# Patient Record
Sex: FEMALE | Race: WHITE | NOT HISPANIC OR LATINO | Employment: UNEMPLOYED | URBAN - METROPOLITAN AREA
[De-identification: names, ages, dates, MRNs, and addresses within clinical notes are randomized per-mention and may not be internally consistent; named-entity substitution may affect disease eponyms.]

---

## 2018-04-02 ENCOUNTER — TELEPHONE (OUTPATIENT)
Dept: INTERNAL MEDICINE | Facility: HOSPITAL | Age: 48
End: 2018-04-02

## 2018-04-02 NOTE — TELEPHONE ENCOUNTER
"Connie Chi called requesting an appt. Per pt she exp an episode of \"gastritis\". returned pt's call, the number provided was a fax number and pt's mobile number is not active.   "

## 2018-04-13 ENCOUNTER — OFFICE VISIT (OUTPATIENT)
Dept: INTERNAL MEDICINE | Facility: HOSPITAL | Age: 48
End: 2018-04-13
Attending: NURSE PRACTITIONER
Payer: COMMERCIAL

## 2018-04-13 VITALS
OXYGEN SATURATION: 99 % | HEIGHT: 67 IN | TEMPERATURE: 96.6 F | DIASTOLIC BLOOD PRESSURE: 67 MMHG | WEIGHT: 143 LBS | BODY MASS INDEX: 22.44 KG/M2 | HEART RATE: 80 BPM | SYSTOLIC BLOOD PRESSURE: 116 MMHG

## 2018-04-13 DIAGNOSIS — K29.30 CHRONIC SUPERFICIAL GASTRITIS WITHOUT BLEEDING: Primary | ICD-10-CM

## 2018-04-13 DIAGNOSIS — D50.0 IRON DEFICIENCY ANEMIA DUE TO CHRONIC BLOOD LOSS: ICD-10-CM

## 2018-04-13 PROCEDURE — 99214 OFFICE O/P EST MOD 30 MIN: CPT | Performed by: NURSE PRACTITIONER

## 2018-04-13 RX ORDER — ESCITALOPRAM OXALATE 10 MG/1
10 TABLET ORAL DAILY
COMMUNITY
End: 2018-05-18 | Stop reason: SDUPTHER

## 2018-04-13 RX ORDER — OMEPRAZOLE 20 MG/1
20 CAPSULE, DELAYED RELEASE ORAL DAILY
COMMUNITY
End: 2019-05-22

## 2018-04-13 NOTE — PROGRESS NOTES
Subjective      Patient ID: Connie Chi is a 47 y.o. female.    Patient whose pmh includes Anxiety, GERD, presents today for acute care visit with c/o abdominal pain.     Patient states her symptoms started 1 week prior to [place], c/w with previous episodes of gastritis and GERD. She presented to an Urgent care and told blood work and UA were WNL. Patient has longstanding h/o GERD and abdominal pain, previously taking Prilosec and following with Gi. Her Prilosec was d/c due to anemia and she had been taking ranitidine which was sufficiently controlling her symptoms for some time.  Patient endorses over last several weeks, increased stress and poor eating habits, which contributed to return of symptoms. Her abdominal pain is epigastric, described as sharp. She resumed taking Prilosec about 2 weeks ago with almost full resolution of her symptoms. Patient denies weight loss, fever, chills, N/V, diarrhea, or constipation.     Patient smokes 1/2 ppd. Denies alcohol or illicit drug use. She denies excessive NSAID use.           The following have been reviewed and updated as appropriate in this visit:  Tobacco  Med Hx  Soc Hx      Review of Systems   Constitutional: Negative for fatigue, fever and unexpected weight change.   Respiratory: Negative for cough and shortness of breath.    Cardiovascular: Negative for chest pain.   Gastrointestinal: Positive for abdominal pain. Negative for blood in stool, constipation, diarrhea, nausea and vomiting.       Past Medical History:   Diagnosis Date   • ADHD    • Anxiety    • GERD (gastroesophageal reflux disease)      Allergies   Allergen Reactions   • Amoxicillin    • Potassium Clavulanate Hives   • Pseudoephedrine Sulfate      Other reaction(s): nervous/anxiety       Current Outpatient Prescriptions   Medication Sig Dispense Refill   • buPROPion XL (WELLBUTRIN XL) 150 mg 24 hr tablet Take 150 mg by mouth daily.     • cyclobenzaprine (FLEXERIL) 10 mg tablet Take 10 mg by  "mouth 3 (three) times a day as needed.     • estradiol (ESTRACE) 1 mg tablet Take 1 mg by mouth daily.     • ferrous sulfate 325 mg (65 mg iron) tablet Take 1 tablet by mouth 2 (two) times a day.     • ranitidine (ZANTAC) 150 mg tablet Take 150 mg by mouth 2 (two) times a day.     • traZODone (DESYREL) 50 mg tablet Take 25 mg by mouth nightly.     • docusate sodium (COLACE) 50 mg capsule Take 50 mg by mouth 2 (two) times a day.     • escitalopram (LEXAPRO) 10 mg tablet Take 10 mg by mouth daily.     • omeprazole (PriLOSEC) 20 mg capsule Take 20 mg by mouth daily.       No current facility-administered medications for this visit.        Objective   /67 (BP Location: Right upper arm, Patient Position: Sitting)   Pulse 80   Temp (!) 35.9 °C (96.6 °F) (Oral)   Ht 1.702 m (5' 7\")   Wt 64.9 kg (143 lb)   SpO2 99%   BMI 22.40 kg/m²  Body mass index is 22.4 kg/m².    Physical Exam   Constitutional: She is oriented to person, place, and time. She appears well-developed and well-nourished. No distress.   Cardiovascular: Normal rate and regular rhythm.    Pulmonary/Chest: Effort normal and breath sounds normal.   Abdominal: Soft. Bowel sounds are normal. She exhibits no distension and no mass. There is no tenderness. There is no rebound and no guarding. No hernia.   Neurological: She is alert and oriented to person, place, and time.   Psychiatric: She has a normal mood and affect. Her behavior is normal. Judgment and thought content normal.       Assessment/Plan   Problem List Items Addressed This Visit     Chronic superficial gastritis without bleeding - Primary     Presenting with recurrent reflux and gastritis. Asymptomatic on PPI. Patient will continue Prilosec for now and follow up with Gi. No alarm symptoms at this time but may need EGD for recurrent symptoms and PMH ADAM.   Discussed and encouraged diet modifications and smoking cessation.          Relevant Medications    omeprazole (PriLOSEC) 20 mg capsule "    ranitidine (ZANTAC) 150 mg tablet    Other Relevant Orders    Ambulatory referral to Gastroenterology    Iron deficiency anemia due to chronic blood loss     H/o ADAM, currently taking iron. Colonoscopy 3-4 years ago, s/p IZZY. Repeat CBC and iron studies today. Consider FIT or colonscopy if still anemic.          Relevant Orders    CBC    Iron and TIBC    Ferritin    Basic metabolic panel              Return in about 8 weeks (around 6/8/2018).  Taya Dunn NP

## 2018-04-13 NOTE — PATIENT INSTRUCTIONS
Continue to take Prilose    Get your blood work completed    Schedule a follow up appointment with Dr. Washburn

## 2018-04-16 PROBLEM — F90.9 ADHD (ATTENTION DEFICIT HYPERACTIVITY DISORDER): Status: ACTIVE | Noted: 2017-04-12

## 2018-04-16 PROBLEM — F41.9 ANXIETY: Status: ACTIVE | Noted: 2017-04-12

## 2018-04-16 RX ORDER — ESTRADIOL 1 MG/1
0.5 TABLET ORAL
COMMUNITY
Start: 2017-04-12

## 2018-04-16 RX ORDER — BUPROPION HYDROCHLORIDE 150 MG/1
150 TABLET ORAL DAILY
COMMUNITY
Start: 2017-04-12 | End: 2018-05-18 | Stop reason: SDUPTHER

## 2018-04-16 RX ORDER — FERROUS SULFATE 325(65) MG
1 TABLET ORAL 2 TIMES DAILY
Status: ON HOLD | COMMUNITY
Start: 2017-11-02 | End: 2018-06-20 | Stop reason: ALTCHOICE

## 2018-04-16 RX ORDER — CYCLOBENZAPRINE HCL 10 MG
10 TABLET ORAL 3 TIMES DAILY PRN
COMMUNITY
Start: 2017-06-28 | End: 2019-05-22

## 2018-04-16 RX ORDER — TRAZODONE HYDROCHLORIDE 50 MG/1
50 TABLET ORAL NIGHTLY PRN
COMMUNITY
Start: 2017-04-12

## 2018-04-16 ASSESSMENT — ENCOUNTER SYMPTOMS
SHORTNESS OF BREATH: 0
DIARRHEA: 0
FATIGUE: 0
CONSTIPATION: 0
BLOOD IN STOOL: 0
NAUSEA: 0
FEVER: 0
COUGH: 0
UNEXPECTED WEIGHT CHANGE: 0
VOMITING: 0
ABDOMINAL PAIN: 1

## 2018-04-16 NOTE — ASSESSMENT & PLAN NOTE
H/o ADAM, currently taking iron. Colonoscopy 3-4 years ago, s/p IZZY. Repeat CBC and iron studies today. Consider FIT or colonscopy if still anemic.

## 2018-04-16 NOTE — ASSESSMENT & PLAN NOTE
Presenting with recurrent reflux and gastritis. Asymptomatic on PPI. Patient will continue Prilosec for now and follow up with Gi. No alarm symptoms at this time but may need EGD for recurrent symptoms and PMH ADAM.   Discussed and encouraged diet modifications and smoking cessation.

## 2018-05-04 LAB
BUN SERPL-MCNC: 9 MG/DL (ref 6–24)
BUN/CREAT SERPL: 10 (ref 9–23)
CALCIUM SERPL-MCNC: 8.9 MG/DL (ref 8.7–10.2)
CHLORIDE SERPL-SCNC: 99 MMOL/L (ref 96–106)
CO2 SERPL-SCNC: 22 MMOL/L (ref 18–29)
CREAT SERPL-MCNC: 0.93 MG/DL (ref 0.57–1)
ERYTHROCYTE [DISTWIDTH] IN BLOOD BY AUTOMATED COUNT: 15.3 % (ref 12.3–15.4)
FERRITIN SERPL-MCNC: 19 NG/ML (ref 15–150)
GFR SERPLBLD CREATININE-BSD FMLA CKD-EPI: 73 ML/MIN/1.73
GFR SERPLBLD CREATININE-BSD FMLA CKD-EPI: 85 ML/MIN/1.73
GLUCOSE SERPL-MCNC: 102 MG/DL (ref 65–99)
HCT VFR BLD AUTO: 37.4 % (ref 34–46.6)
HGB BLD-MCNC: 12.2 G/DL (ref 11.1–15.9)
IRON SATN MFR SERPL: 9 % (ref 15–55)
IRON SERPL-MCNC: 27 UG/DL (ref 27–159)
MCH RBC QN AUTO: 27.1 PG (ref 26.6–33)
MCHC RBC AUTO-ENTMCNC: 32.6 G/DL (ref 31.5–35.7)
MCV RBC AUTO: 83 FL (ref 79–97)
PLATELET # BLD AUTO: 267 X10E3/UL (ref 150–379)
POTASSIUM SERPL-SCNC: 4 MMOL/L (ref 3.5–5.2)
RBC # BLD AUTO: 4.51 X10E6/UL (ref 3.77–5.28)
SODIUM SERPL-SCNC: 139 MMOL/L (ref 134–144)
TIBC SERPL-MCNC: 304 UG/DL (ref 250–450)
UIBC SERPL-MCNC: 277 UG/DL (ref 131–425)
WBC # BLD AUTO: 8 X10E3/UL (ref 3.4–10.8)

## 2018-05-18 ENCOUNTER — HOSPITAL ENCOUNTER (EMERGENCY)
Facility: HOSPITAL | Age: 48
Discharge: HOME | End: 2018-05-18
Attending: EMERGENCY MEDICINE
Payer: COMMERCIAL

## 2018-05-18 VITALS
HEART RATE: 86 BPM | TEMPERATURE: 97.6 F | HEIGHT: 70 IN | RESPIRATION RATE: 18 BRPM | DIASTOLIC BLOOD PRESSURE: 70 MMHG | BODY MASS INDEX: 20.76 KG/M2 | WEIGHT: 145 LBS | SYSTOLIC BLOOD PRESSURE: 101 MMHG | OXYGEN SATURATION: 99 %

## 2018-05-18 DIAGNOSIS — Z76.0 MEDICATION REFILL: Primary | ICD-10-CM

## 2018-05-18 PROCEDURE — 99281 EMR DPT VST MAYX REQ PHY/QHP: CPT | Mod: U5

## 2018-05-18 RX ORDER — ESCITALOPRAM OXALATE 10 MG/1
10 TABLET ORAL DAILY
Qty: 5 TABLET | Refills: 0 | Status: SHIPPED | OUTPATIENT
Start: 2018-05-18 | End: 2018-05-18

## 2018-05-18 RX ORDER — ESCITALOPRAM OXALATE 10 MG/1
10 TABLET ORAL DAILY
Qty: 5 TABLET | Refills: 0 | Status: SHIPPED | OUTPATIENT
Start: 2018-05-18 | End: 2018-05-23

## 2018-05-18 RX ORDER — BUPROPION HYDROCHLORIDE 150 MG/1
150 TABLET ORAL DAILY
Qty: 5 TABLET | Refills: 0 | Status: SHIPPED | OUTPATIENT
Start: 2018-05-18 | End: 2018-05-18

## 2018-05-18 RX ORDER — BUPROPION HYDROCHLORIDE 150 MG/1
150 TABLET ORAL DAILY
Qty: 5 TABLET | Refills: 0 | Status: SHIPPED | OUTPATIENT
Start: 2018-05-18 | End: 2018-05-23

## 2018-05-18 ASSESSMENT — ENCOUNTER SYMPTOMS
SLEEP DISTURBANCE: 0
FEVER: 0
CHEST TIGHTNESS: 0
AGITATION: 0
SHORTNESS OF BREATH: 0
PALPITATIONS: 0

## 2018-05-19 NOTE — ED PROVIDER NOTES
"HPI     Chief Complaint   Patient presents with   • Med Refill       47 year old female treated at [facility] for depression.  Had missed a couple of appointments had to go through intake again last month.  Was given 1 month rx of medications but having trouble getting appointment with new psychiatrist.  They will not refill her rx until evaluated.  Was able to get appointment for 5/23 but ran out of wellbutrin and lexapro yesterday.  Denies any other complaints.  Denies suicidal or homicidal ideation.              Patient History     Past Medical History:   Diagnosis Date   • ADHD    • Anxiety    • GERD (gastroesophageal reflux disease)        Past Surgical History:   Procedure Laterality Date   • HYSTERECTOMY  2008    IZZY 2/2 endometriosis       Family History   Problem Relation Age of Onset   • Heart disease Father        Social History   Substance Use Topics   • Smoking status: Current Every Day Smoker     Packs/day: 0.50     Years: 30.00   • Smokeless tobacco: Never Used   • Alcohol use No       Systems Reviewed from Nursing Triage:  Tobacco  Allergies  Meds  Problems  Med Hx  Surg Hx  Soc Hx         Review of Systems     Review of Systems   Constitutional: Negative for fever.   Respiratory: Negative for chest tightness and shortness of breath.    Cardiovascular: Negative for palpitations.   Psychiatric/Behavioral: Negative for agitation, behavioral problems, self-injury, sleep disturbance and suicidal ideas.        Physical Exam     ED Triage Vitals [05/18/18 1914]   Temp Heart Rate Resp BP SpO2   36.4 °C (97.6 °F) 86 18 101/70 99 %      Temp Source Heart Rate Source Patient Position BP Location FiO2 (%) (Set)   Tympanic -- -- -- --                     Patient Vitals for the past 24 hrs:   BP Temp Temp src Pulse Resp SpO2 Height Weight   05/18/18 1914 101/70 36.4 °C (97.6 °F) Tympanic 86 18 99 % 1.778 m (5' 10\") 65.8 kg (145 lb)           Physical Exam   Constitutional: She appears " well-developed and well-nourished.   HENT:   Head: Normocephalic.   Cardiovascular: Normal rate, regular rhythm and normal heart sounds.    Pulmonary/Chest: Effort normal and breath sounds normal.   Skin: Skin is warm and dry.   Nursing note and vitals reviewed.           Procedures    ED Course & MDM     Labs Reviewed - No data to display    No orders to display           MDM         ED Course          Clinical Impressions as of May 19 0521   Medication refill     Disposition:  Discharge     JAMIE Simms  05/19/18 0521

## 2018-05-24 ENCOUNTER — OFFICE VISIT (OUTPATIENT)
Dept: GASTROENTEROLOGY | Facility: HOSPITAL | Age: 48
End: 2018-05-24
Payer: COMMERCIAL

## 2018-05-24 VITALS
SYSTOLIC BLOOD PRESSURE: 119 MMHG | TEMPERATURE: 97.8 F | HEART RATE: 78 BPM | RESPIRATION RATE: 18 BRPM | BODY MASS INDEX: 20.33 KG/M2 | WEIGHT: 142 LBS | DIASTOLIC BLOOD PRESSURE: 77 MMHG | OXYGEN SATURATION: 98 % | HEIGHT: 70 IN

## 2018-05-24 DIAGNOSIS — K21.9 GERD WITHOUT ESOPHAGITIS: ICD-10-CM

## 2018-05-24 DIAGNOSIS — K58.0 IRRITABLE BOWEL SYNDROME WITH DIARRHEA: Primary | ICD-10-CM

## 2018-05-24 DIAGNOSIS — R63.4 WEIGHT LOSS: ICD-10-CM

## 2018-05-24 RX ORDER — BISACODYL 5 MG
TABLET, DELAYED RELEASE (ENTERIC COATED) ORAL
Qty: 4 TABLET | Refills: 0 | Status: ON HOLD | OUTPATIENT
Start: 2018-05-24 | End: 2018-06-20 | Stop reason: ALTCHOICE

## 2018-05-24 RX ORDER — POLYETHYLENE GLYCOL 3350 17 G/17G
POWDER, FOR SOLUTION ORAL
Qty: 238 G | Refills: 0 | Status: ON HOLD | OUTPATIENT
Start: 2018-06-19 | End: 2018-06-20 | Stop reason: ALTCHOICE

## 2018-05-24 ASSESSMENT — ENCOUNTER SYMPTOMS
DIARRHEA: 1
ANAL BLEEDING: 0
ABDOMINAL DISTENTION: 1
PSYCHIATRIC NEGATIVE: 1
ABDOMINAL PAIN: 0
CONSTITUTIONAL NEGATIVE: 1
BLOOD IN STOOL: 0
VOMITING: 0
MUSCULOSKELETAL NEGATIVE: 1
SINUS PRESSURE: 1
NAUSEA: 1
RHINORRHEA: 1
RECTAL PAIN: 0
CONSTIPATION: 0

## 2018-05-24 NOTE — ASSESSMENT & PLAN NOTE
Continue taking ranitidine as needed. Can do daily prilosec if needed.  Discussed avoiding trigger foods and lifestyle modification (smoking cessation)  Will perform upper endoscopy due to chronic ongoing symptoms and weight loss.

## 2018-05-24 NOTE — PROGRESS NOTES
"  Subjective     Patient ID: Connie Chi is a 47 y.o. female.    HPI  The patient reports having a gastritis \"attack\" after eating a soda and a salad, which she describes sharp stabbing pain in her stomach, accompanied by nausea with no vomiting. She felt that her abdomen was distended as well. The pain continued for 4 days, after which she took prilosec which improved her symptoms. She was taking zantac as needed, usually after eating a trigger food, she t mentioned cheesestake, onions, bread. Only having episodes 1-2 x /month.    Since then she has felt much improved without further reflux or abdominal pain. She generally ignores the reflux sensation but does report that the prilosec helps her to notice by feeling better.    She has had an upper endoscopy, but could not remember the last time it was done. She has colonoscopy in the past, was told she has IBD, but it was acute not chronic. She is due for a colonoscopy per her outpatient records.    She noticed she has been losing weight around 10 lbs and her clothes were loose. She feels that she is eating the same amount, although she is trying to eat more healthy.    She is having a well formed stool but then after will pass loose stools. No blood in her stools. No straining and then none after initial passage of stools.     She has recently been taken off of estridiol but is weaning this off. She had endometriosis with diffuse abdominal involvement and had an oophorectomy. Has not vaginal bleeding.     Review of Systems   Constitutional: Negative.    HENT: Positive for congestion, rhinorrhea and sinus pressure.    Gastrointestinal: Positive for abdominal distention, diarrhea and nausea. Negative for abdominal pain, anal bleeding, blood in stool, constipation, rectal pain and vomiting.   Genitourinary: Negative.    Musculoskeletal: Negative.    Skin: Positive for rash.   Psychiatric/Behavioral: Negative.        Objective     Vitals:    05/24/18 1335   BP: " "119/77   Pulse: 78   Resp: 18   Temp: 36.6 °C (97.8 °F)   SpO2: 98%   Weight: 64.4 kg (142 lb)   Height: 1.778 m (5' 10\")     Body mass index is 20.37 kg/m².    Physical Exam   Constitutional: She is oriented to person, place, and time. She appears well-developed and well-nourished.   Eyes: EOM are normal. Pupils are equal, round, and reactive to light.   Neck: Normal range of motion. Neck supple.   Cardiovascular: Normal rate, regular rhythm, normal heart sounds and intact distal pulses.    Pulmonary/Chest: Effort normal and breath sounds normal.   Abdominal: Soft. Bowel sounds are normal. She exhibits no distension and no mass. There is no tenderness. There is no rebound and no guarding. No hernia.   Neurological: She is alert and oriented to person, place, and time.   Psychiatric: She has a normal mood and affect. Her behavior is normal. Judgment and thought content normal.   Nursing note and vitals reviewed.      Assessment/Plan   Problem List Items Addressed This Visit        Digestive    Irritable colon - Primary    Relevant Medications    polyethylene glycol (MIRALAX) 17 gram/dose powder (Start on 6/19/2018)    bisacodyl (DULCOLAX) 5 mg EC tablet    GERD without esophagitis     Continue taking ranitidine as needed. Can do daily prilosec if needed.  Discussed avoiding trigger foods and lifestyle modification (smoking cessation)  Will perform upper endoscopy due to chronic ongoing symptoms and weight loss.            Other    Weight loss     - B12 and folate levels  -TSH  - Continue to follow weight         Relevant Orders    Vitamin B12    Folate    TSH            "

## 2018-05-24 NOTE — PATIENT INSTRUCTIONS
- Plan for EGD and Colonoscopy on 6/20/2018  - Please take the prep as directed.  - Please call with ?'s  - You will need someone to d rive you to and from your appointment.  - Continue taking priolsec as needed with ranitidine as primary therapy

## 2018-05-25 ENCOUNTER — PREP FOR CASE (OUTPATIENT)
Dept: GASTROENTEROLOGY | Facility: HOSPITAL | Age: 48
End: 2018-05-25

## 2018-05-25 DIAGNOSIS — K21.9 GASTROESOPHAGEAL REFLUX DISEASE WITHOUT ESOPHAGITIS: ICD-10-CM

## 2018-05-25 DIAGNOSIS — R63.4 WEIGHT LOSS: Primary | ICD-10-CM

## 2018-05-25 DIAGNOSIS — R19.7 DIARRHEA, UNSPECIFIED TYPE: ICD-10-CM

## 2018-05-25 DIAGNOSIS — K52.9 INFLAMMATORY BOWEL DISEASE: ICD-10-CM

## 2018-05-25 RX ORDER — DEXTROSE 50 % IN WATER (D50W) INTRAVENOUS SYRINGE
25 AS NEEDED
Status: CANCELLED | OUTPATIENT
Start: 2018-05-25 | End: 2018-08-23

## 2018-05-25 RX ORDER — IBUPROFEN 200 MG
16-32 TABLET ORAL AS NEEDED
Status: CANCELLED | OUTPATIENT
Start: 2018-05-25 | End: 2018-08-23

## 2018-05-25 RX ORDER — DEXTROSE 40 %
15-30 GEL (GRAM) ORAL AS NEEDED
Status: CANCELLED | OUTPATIENT
Start: 2018-05-25 | End: 2018-08-23

## 2018-06-01 PROBLEM — K21.9 GASTROESOPHAGEAL REFLUX DISEASE WITHOUT ESOPHAGITIS: Status: ACTIVE | Noted: 2018-06-01

## 2018-06-01 PROBLEM — R19.7 DIARRHEA: Status: ACTIVE | Noted: 2018-06-01

## 2018-06-01 PROBLEM — K52.9 INFLAMMATORY BOWEL DISEASE: Status: ACTIVE | Noted: 2018-06-01

## 2018-06-01 LAB
FOLATE SERPL-MCNC: 11.7 NG/ML
TSH SERPL DL<=0.005 MIU/L-ACNC: 0.77 UIU/ML (ref 0.45–4.5)
VIT B12 SERPL-MCNC: 343 PG/ML (ref 232–1245)

## 2018-06-20 ENCOUNTER — ANESTHESIA (OUTPATIENT)
Dept: ENDOSCOPY | Facility: HOSPITAL | Age: 48
End: 2018-06-20
Payer: COMMERCIAL

## 2018-06-20 ENCOUNTER — ANESTHESIA EVENT (OUTPATIENT)
Dept: ENDOSCOPY | Facility: HOSPITAL | Age: 48
End: 2018-06-20
Payer: COMMERCIAL

## 2018-06-20 ENCOUNTER — HOSPITAL ENCOUNTER (OUTPATIENT)
Facility: HOSPITAL | Age: 48
Discharge: HOME | End: 2018-06-20
Attending: INTERNAL MEDICINE | Admitting: INTERNAL MEDICINE
Payer: COMMERCIAL

## 2018-06-20 VITALS
DIASTOLIC BLOOD PRESSURE: 75 MMHG | SYSTOLIC BLOOD PRESSURE: 120 MMHG | HEART RATE: 72 BPM | RESPIRATION RATE: 16 BRPM | TEMPERATURE: 96.9 F | BODY MASS INDEX: 20.33 KG/M2 | OXYGEN SATURATION: 100 % | HEIGHT: 70 IN | WEIGHT: 142 LBS

## 2018-06-20 DIAGNOSIS — K21.9 GASTROESOPHAGEAL REFLUX DISEASE WITHOUT ESOPHAGITIS: ICD-10-CM

## 2018-06-20 DIAGNOSIS — K52.9 INFLAMMATORY BOWEL DISEASE: ICD-10-CM

## 2018-06-20 DIAGNOSIS — R63.4 WEIGHT LOSS: ICD-10-CM

## 2018-06-20 DIAGNOSIS — K29.70 GASTRITIS WITHOUT BLEEDING, UNSPECIFIED CHRONICITY, UNSPECIFIED GASTRITIS TYPE: ICD-10-CM

## 2018-06-20 DIAGNOSIS — R19.7 DIARRHEA, UNSPECIFIED TYPE: ICD-10-CM

## 2018-06-20 LAB — B-HCG UR QL: NEGATIVE

## 2018-06-20 PROCEDURE — 0DBL8ZX EXCISION OF TRANSVERSE COLON, VIA NATURAL OR ARTIFICIAL OPENING ENDOSCOPIC, DIAGNOSTIC: ICD-10-PCS | Performed by: INTERNAL MEDICINE

## 2018-06-20 PROCEDURE — 88305 TISSUE EXAM BY PATHOLOGIST: CPT | Performed by: INTERNAL MEDICINE

## 2018-06-20 PROCEDURE — 25000000 HC PHARMACY GENERAL: Performed by: NURSE ANESTHETIST, CERTIFIED REGISTERED

## 2018-06-20 PROCEDURE — 25800000 HC PHARMACY IV SOLUTIONS: Performed by: NURSE ANESTHETIST, CERTIFIED REGISTERED

## 2018-06-20 PROCEDURE — 75000011 HC COLONSCOPY BIOPSY: Performed by: INTERNAL MEDICINE

## 2018-06-20 PROCEDURE — 75000060 HC EGD BIOPSY: Performed by: INTERNAL MEDICINE

## 2018-06-20 PROCEDURE — 63600000 HC DRUGS/DETAIL CODE: Performed by: NURSE ANESTHETIST, CERTIFIED REGISTERED

## 2018-06-20 PROCEDURE — 0DBN8ZX EXCISION OF SIGMOID COLON, VIA NATURAL OR ARTIFICIAL OPENING ENDOSCOPIC, DIAGNOSTIC: ICD-10-PCS | Performed by: INTERNAL MEDICINE

## 2018-06-20 PROCEDURE — 0DBP8ZX EXCISION OF RECTUM, VIA NATURAL OR ARTIFICIAL OPENING ENDOSCOPIC, DIAGNOSTIC: ICD-10-PCS | Performed by: INTERNAL MEDICINE

## 2018-06-20 PROCEDURE — 37000002 HC ANESTHESIA MAC: Performed by: INTERNAL MEDICINE

## 2018-06-20 PROCEDURE — 0DBH8ZX EXCISION OF CECUM, VIA NATURAL OR ARTIFICIAL OPENING ENDOSCOPIC, DIAGNOSTIC: ICD-10-PCS | Performed by: INTERNAL MEDICINE

## 2018-06-20 PROCEDURE — 0DB78ZX EXCISION OF STOMACH, PYLORUS, VIA NATURAL OR ARTIFICIAL OPENING ENDOSCOPIC, DIAGNOSTIC: ICD-10-PCS | Performed by: INTERNAL MEDICINE

## 2018-06-20 RX ORDER — LIDOCAINE HYDROCHLORIDE 10 MG/ML
INJECTION, SOLUTION EPIDURAL; INFILTRATION; INTRACAUDAL; PERINEURAL AS NEEDED
Status: DISCONTINUED | OUTPATIENT
Start: 2018-06-20 | End: 2018-06-20 | Stop reason: SURG

## 2018-06-20 RX ORDER — DEXTROSE 50 % IN WATER (D50W) INTRAVENOUS SYRINGE
25 AS NEEDED
Status: DISCONTINUED | OUTPATIENT
Start: 2018-06-20 | End: 2018-06-20 | Stop reason: HOSPADM

## 2018-06-20 RX ORDER — IBUPROFEN 200 MG
16-32 TABLET ORAL AS NEEDED
Status: DISCONTINUED | OUTPATIENT
Start: 2018-06-20 | End: 2018-06-20 | Stop reason: HOSPADM

## 2018-06-20 RX ORDER — SODIUM CHLORIDE 9 MG/ML
INJECTION, SOLUTION INTRAVENOUS CONTINUOUS PRN
Status: DISCONTINUED | OUTPATIENT
Start: 2018-06-20 | End: 2018-06-20 | Stop reason: SURG

## 2018-06-20 RX ORDER — BUPROPION HYDROCHLORIDE 150 MG/1
200 TABLET, EXTENDED RELEASE ORAL DAILY
COMMUNITY
End: 2021-11-16 | Stop reason: ALTCHOICE

## 2018-06-20 RX ORDER — PROPOFOL 10 MG/ML
INJECTION, EMULSION INTRAVENOUS CONTINUOUS PRN
Status: DISCONTINUED | OUTPATIENT
Start: 2018-06-20 | End: 2018-06-20 | Stop reason: SURG

## 2018-06-20 RX ORDER — DEXTROSE 40 %
15-30 GEL (GRAM) ORAL AS NEEDED
Status: DISCONTINUED | OUTPATIENT
Start: 2018-06-20 | End: 2018-06-20 | Stop reason: HOSPADM

## 2018-06-20 RX ORDER — ESCITALOPRAM OXALATE 10 MG/1
10 TABLET ORAL DAILY
COMMUNITY
End: 2019-05-03 | Stop reason: ALTCHOICE

## 2018-06-20 RX ORDER — PROPOFOL 200MG/20ML
SYRINGE (ML) INTRAVENOUS AS NEEDED
Status: DISCONTINUED | OUTPATIENT
Start: 2018-06-20 | End: 2018-06-20 | Stop reason: SURG

## 2018-06-20 RX ORDER — GLYCOPYRROLATE 0.6MG/3ML
SYRINGE (ML) INTRAVENOUS AS NEEDED
Status: DISCONTINUED | OUTPATIENT
Start: 2018-06-20 | End: 2018-06-20 | Stop reason: SURG

## 2018-06-20 RX ADMIN — PROPOFOL 20 MG: 10 INJECTION, EMULSION INTRAVENOUS at 11:43

## 2018-06-20 RX ADMIN — PROPOFOL 140 MCG/KG/MIN: 10 INJECTION, EMULSION INTRAVENOUS at 11:48

## 2018-06-20 RX ADMIN — PROPOFOL 50 MG: 10 INJECTION, EMULSION INTRAVENOUS at 11:39

## 2018-06-20 RX ADMIN — GLYCOPYRROLATE 0.2 MG: 0.2 INJECTION INTRAMUSCULAR; INTRAVENOUS at 11:32

## 2018-06-20 RX ADMIN — PROPOFOL 20 MG: 10 INJECTION, EMULSION INTRAVENOUS at 11:50

## 2018-06-20 RX ADMIN — LIDOCAINE HYDROCHLORIDE 8 ML: 10 INJECTION, SOLUTION EPIDURAL; INFILTRATION; INTRACAUDAL; PERINEURAL at 11:35

## 2018-06-20 RX ADMIN — SODIUM CHLORIDE: 9 INJECTION, SOLUTION INTRAVENOUS at 11:29

## 2018-06-20 RX ADMIN — PROPOFOL 20 MG: 10 INJECTION, EMULSION INTRAVENOUS at 11:42

## 2018-06-20 RX ADMIN — PROPOFOL 40 MG: 10 INJECTION, EMULSION INTRAVENOUS at 11:41

## 2018-06-20 RX ADMIN — PROPOFOL 20 MG: 10 INJECTION, EMULSION INTRAVENOUS at 11:40

## 2018-06-20 RX ADMIN — PROPOFOL 40 MG: 10 INJECTION, EMULSION INTRAVENOUS at 11:45

## 2018-06-20 RX ADMIN — PROPOFOL 50 MG: 10 INJECTION, EMULSION INTRAVENOUS at 11:38

## 2018-06-20 RX ADMIN — PROPOFOL 20 MG: 10 INJECTION, EMULSION INTRAVENOUS at 11:52

## 2018-06-20 RX ADMIN — PROPOFOL 100 MG: 10 INJECTION, EMULSION INTRAVENOUS at 11:37

## 2018-06-20 ASSESSMENT — PAIN SCALES - GENERAL: PAIN_LEVEL: 0

## 2018-06-20 ASSESSMENT — PAIN - FUNCTIONAL ASSESSMENT
PAIN_FUNCTIONAL_ASSESSMENT: UNABLE TO ASSESS
PAIN_FUNCTIONAL_ASSESSMENT: UNABLE TO ASSESS

## 2018-06-20 ASSESSMENT — LIFESTYLE VARIABLES: TOBACCO_USE: 1

## 2018-06-20 NOTE — OP NOTE
_______________________________________________________________________________  Patient Name: Connie Chi       Procedure Date: 6/20/2018 11:26 AM  MRN: [id number]                     Account Number: [id number]  YOB: 1970             Age: 47  Gender: Female                        Note Status: Finalized  Attending MD: MAYE AMATO MD~LIMA  _______________________________________________________________________________  Procedure:           Upper GI endoscopy  Indications:         Heartburn, Suspected gastro-esophageal reflux disease,  Weight loss  Providers:           MAYE AMATO MD~LIMA (Doctor), JOSE LUIS GARRETT DO (Fellow), Lacy Holt RN (Nurse)  Referring MD:        KATHY DAVIS MD  Requesting Provider:  Medicines:           Monitored Anesthesia Care  Complications:       No immediate complications.  _______________________________________________________________________________  Procedure:           Pre-Anesthesia Assessment:  - The heart rate, respiratory rate, oxygen saturations,  blood pressure, adequacy of pulmonary ventilation, and  response to care were monitored throughout the procedure.  After obtaining informed consent, the endoscope was  passed under direct vision. Throughout the procedure,  the patient's blood pressure, pulse, and oxygen  saturations were monitored continuously. The endoscope  was introduced through the mouth, and advanced to the  second part of duodenum. The upper GI endoscopy was  accomplished without difficulty. The patient tolerated  the procedure well.  Findings:  The Z-line was regular and was found 37 cm from the incisors.  The examined esophagus was normal.  A 4 mm scar was found in the gastric antrum. The scar tissue was healthy  in appearance.  Localized mild inflammation characterized by erythema was found in the  gastric antrum. Biopsies were taken with a cold forceps for Helicobacter  pylori  testing.  The examined duodenum was normal.  Impression:          - Z-line regular, 37 cm from the incisors.  - Normal esophagus.  - Gastritis. Biopsied.  - Small linear scar in the antrum. Likely a healed ulcer.  - Small hiatus hernia.  - Normal examined duodenum.  Recommendation:      - Discharge patient to home.  - Patient has a contact number available for  emergencies. The signs and symptoms of potential delayed  complications were discussed with the patient. Return to  normal activities tomorrow. Written discharge  instructions were provided to the patient.  - Resume regular diet.  - Use Prilosec (omeprazole) 40 mg PO daily.  - Await pathology results.  - Telephone GI office for pathology results in 2 weeks.  - The findings and recommendations were discussed with  the patient.  Procedure Code(s):   --- Professional ---  52727, Esophagogastroduodenoscopy, flexible, transoral;  with biopsy, single or multiple  Diagnosis Code(s):   --- Professional ---  K29.70, Gastritis, unspecified, without bleeding  K44.9, Diaphragmatic hernia without obstruction or  gangrene  R12, Heartburn  R63.4, Abnormal weight loss  CPT copyright 2015 American Medical Association. All rights reserved.  The codes documented in this report are preliminary and upon  review may  be revised to meet current compliance requirements.  Attending Participation:  I was present and participated during the entire procedure, including  non-key portions.  _____________________________________  MAYE AMATO MD~LIMA  6/20/2018 5:00:36 PM  This report has been signed electronically.  Number of Addenda: 0  Note Initiated On: 6/20/2018 11:26 AM

## 2018-06-20 NOTE — DISCHARGE INSTRUCTIONS
Gastritis, Adult  Gastritis is swelling (inflammation) of the stomach. When you have this condition, you can have these problems (symptoms):  · Pain in your stomach.  · A burning feeling in your stomach.  · Feeling sick to your stomach (nauseous).  · Throwing up (vomiting).  · Feeling too full after you eat.  It is important to get help for this condition. Without help, your stomach can bleed, and you can get sores (ulcers) in your stomach.  Follow these instructions at home:  · Take over-the-counter and prescription medicines only as told by your doctor.  · If you were prescribed an antibiotic medicine, take it as told by your doctor. Do not stop taking it even if you start to feel better.  · Drink enough fluid to keep your pee (urine) clear or pale yellow.  · Instead of eating big meals, eat small meals often.  Contact a health care provider if:  · Your problems get worse.  · Your problems go away and then come back.  Get help right away if:  · You throw up blood or something that looks like coffee grounds.  · You have black or dark red poop (stools).  · You cannot keep fluids down.  · Your stomach pain gets worse.  · You have a fever.  · You do not feel better after 1 week.  This information is not intended to replace advice given to you by your health care provider. Make sure you discuss any questions you have with your health care provider.  Document Released: 06/05/2009 Document Revised: 08/16/2017 Document Reviewed: 09/10/2016  [employer] Interactive Patient Education © 2018 [employer]    Colon Polyps  Polyps are tissue growths inside the body. Polyps can grow in many places, including the large intestine (colon). A polyp may be a round bump or a mushroom-shaped growth. You could have one polyp or several.  Most colon polyps are noncancerous (benign). However, some colon polyps can become cancerous over time.  What are the causes?  The exact cause of colon polyps is not known.  What increases the risk?  This  condition is more likely to develop in people who:  · Have a family history of colon cancer or colon polyps.  · Are older than 50 or older than 45 if they are .  · Have inflammatory bowel disease, such as ulcerative colitis or Crohn disease.  · Are overweight.  · Smoke cigarettes.  · Do not get enough exercise.  · Drink too much alcohol.  · Eat a diet that is:  ¨ High in fat and red meat.  ¨ Low in fiber.  · Had childhood cancer that was treated with abdominal radiation.  What are the signs or symptoms?  Most polyps do not cause symptoms. If you have symptoms, they may include:  · Blood coming from your rectum when having a bowel movement.  · Blood in your stool. The stool may look dark red or black.  · A change in bowel habits, such as constipation or diarrhea.  How is this diagnosed?  This condition is diagnosed with a colonoscopy. This is a procedure that uses a lighted, flexible scope to look at the inside of your colon.  How is this treated?  Treatment for this condition involves removing any polyps that are found. Those polyps will then be tested for cancer. If cancer is found, your health care provider will talk to you about options for colon cancer treatment.  Follow these instructions at home:  Diet  · Eat plenty of fiber, such as fruits, vegetables, and whole grains.  · Eat foods that are high in calcium and vitamin D, such as milk, cheese, yogurt, eggs, liver, fish, and broccoli.  · Limit foods high in fat, red meats, and processed meats, such as hot dogs, sausage, coleman, and lunch meats.  · Maintain a healthy weight, or lose weight if recommended by your health care provider.  General instructions  · Do not smoke cigarettes.  · Do not drink alcohol excessively.  · Keep all follow-up visits as told by your health care provider. This is important. This includes keeping regularly scheduled colonoscopies. Talk to your health care provider about when you need a colonoscopy.  · Exercise every  day or as told by your health care provider.  Contact a health care provider if:  · You have new or worsening bleeding during a bowel movement.  · You have new or increased blood in your stool.  · You have a change in bowel habits.  · You unexpectedly lose weight.  This information is not intended to replace advice given to you by your health care provider. Make sure you discuss any questions you have with your health care provider.  Document Released: 09/13/2005 Document Revised: 05/25/2017 Document Reviewed: 11/07/2016  [employer] Interactive Patient Education © 2018 [employer]

## 2018-06-20 NOTE — ANESTHESIA POSTPROCEDURE EVALUATION
Patient: Connie Chi    Procedure Summary     Date:  06/20/18 Room / Location:  [facility] (A) / [facility]    Anesthesia Start:  1129 Anesthesia Stop:  1220    Procedures:       FLEXIBLE COLONOSCOPY PROXIMAL TO SPLENIC FLEXURE WITH BIOPSY (N/A Anus)      UPPER GASTROINTESTINAL ENDOSCOPY WITH BIOPSY (N/A Esophagus) Diagnosis:       Inflammatory bowel disease      Weight loss      Gastroesophageal reflux disease without esophagitis      Diarrhea, unspecified type      (Inflammatory bowel disease [K52.9])      (Weight loss [R63.4])      (Gastroesophageal reflux disease without esophagitis [K21.9])      (Diarrhea, unspecified type [R19.7])    Provider:  Gerson Bocaengra MD Responsible Provider:  Layo Leo MD    Anesthesia Type:  MAC ASA Status:  2          Anesthesia Type: MAC  PACU Vitals  6/20/2018 1215 - 6/20/2018 1309      6/20/2018 1219 6/20/2018 1224 6/20/2018 1230 6/20/2018 1245    BP: (!)  99/57 (!)  89/53 (!)  94/56 (!)  96/55    Temp: (!)  35.8 °C (96.5 °F) - - -    Pulse: 88 86 85 74    Resp: 16 16 16 -    SpO2: 100 % 100 % 96 % 100 %              6/20/2018 1300 6/20/2018 1305          BP: 119/68 120/75      Temp: - (!)  36.1 °C (96.9 °F)      Pulse: 80 72      Resp: - -      SpO2: 100 % 100 %              Anesthesia Post Evaluation    Pain score: 0  Pain management: satisfactory to patient  Mode of pain management: IV medication  Patient location during evaluation: PACU  Patient participation: complete - patient participated  Level of consciousness: awake and alert  Cardiovascular status: acceptable  Airway Patency: adequate  Respiratory status: acceptable  Hydration status: stable  Anesthetic complications: no

## 2018-06-20 NOTE — OP NOTE
_______________________________________________________________________________  Patient Name: Connie Chi       Procedure Date: 6/20/2018 11:24 AM  MRN: [id number]                     Account Number: [id number]  YOB: 1970             Age: 47  Gender: Female                        Note Status: Finalized  Attending MD: MAYE AMATO MD~LIMA  _______________________________________________________________________________  Procedure:           Colonoscopy  Indications:         Personal history of inflammatory bowel disease,  Clinically significant diarrhea of unexplained origin,  Weight loss  Providers:           MAYE AMATO MD~LIMA (Doctor), JOSE LUIS GARRETT DO (Fellow), Lacy Holt RN (Nurse)  Referring MD:        KATHY DAVIS MD  Requesting Provider:  Medicines:           Monitored Anesthesia Care  Complications:       No immediate complications.  _______________________________________________________________________________  Procedure:           After I obtained informed consent, the scope was passed  under direct vision. Throughout the procedure, the  patient's blood pressure, pulse, and oxygen saturations  were monitored continuously. The Colonoscope was  introduced through the anus and advanced to the terminal  ileum. The colonoscopy was performed without difficulty.  The patient tolerated the procedure well. The quality of  the bowel preparation was good.  Findings:  The perianal and digital rectal examinations were normal.  A 4 mm polyp was found in the rectum. The polyp was sessile. The polyp  was removed with a cold biopsy forceps. Resection and retrieval were  complete.  The exam was otherwise normal throughout the examined colon.  The terminal ileum appeared normal. Biopsies were taken with a cold  forceps for histology.  Biopsies were taken with a cold forceps in the rectum, in the sigmoid  colon, in the transverse colon and in the cecum  for histology to  evaluate for microscopic colitis and her history of IBD.  Impression:          - One 4 mm polyp in the rectum, removed with a cold  biopsy forceps. Resected and retrieved.  - The examined portion of the ileum was normal. Biopsied.  - Biopsies were taken with a cold forceps for histology  in the rectum, in the sigmoid colon, in the transverse  colon and in the cecum.  Recommendation:      - Patient has a contact number available for  emergencies. The signs and symptoms of potential delayed  complications were discussed with the patient. Return to  normal activities tomorrow. Written discharge  instructions were provided to the patient.  - Discharge patient to home.  - Resume regular diet.  - Continue present medications.  - Await pathology results.  - Telephone GI office for pathology results.  - The findings and recommendations were discussed with  the patient.  Procedure Code(s):   --- Professional ---  26558, Colonoscopy, flexible; with biopsy, single or  multiple  Diagnosis Code(s):   --- Professional ---  K62.1, Rectal polyp  Z87.19, Personal history of other diseases of the  digestive system  R19.7, Diarrhea, unspecified  R63.4, Abnormal weight loss  CPT copyright 2015 [employer]. All rights reserved.  The codes documented in this report are preliminary and upon  review may  be revised to meet current compliance requirements.  Attending Participation:  I was present and participated during the entire procedure, including  non-key portions.  _____________________________________  MAYE AMATO MD~LIMA  6/20/2018 5:01:08 PM  This report has been signed electronically.  Number of Addenda: 0  Note Initiated On: 6/20/2018 11:24 AM

## 2018-06-20 NOTE — ANESTHESIA PREPROCEDURE EVALUATION
Anesthesia ROS/MED HX      Pulmonary    tobacco use  Neuro/Psych    Anxiety  Cardiovascular- neg  GI/Hepatic   GERD  ROS/MED HX Comments:    Neurology/Psychology: ADHD    Past Medical History:   Diagnosis Date   • ADHD    • Anxiety    • GERD (gastroesophageal reflux disease)          Past Surgical History:   Procedure Laterality Date   • HYSTERECTOMY  2008    IZZY 2/2 endometriosis   • WISDOM TOOTH EXTRACTION         Physical Exam    Airway   Mallampati: I   TM distance: >3 FB   Neck ROM: full  Cardiovascular    Rhythm: regular   Rate: normal  Pulmonary    Decreased breath sounds        Anesthesia Plan    Plan: MAC    Technique: MAC     Airway: natural airway / supplemental oxygen   ASA 2  Anesthetic plan and risks discussed with: patient  Induction:    intravenous   Postop Plan:   Patient Disposition: phase II then home

## 2018-06-20 NOTE — H&P
"   GI H/P - Pre Procedure          Subjective   Connie Chi is a 47 y.o. female who was admitted for Inflammatory bowel disease [K52.9]  Weight loss [R63.4]  Gastroesophageal reflux disease without esophagitis [K21.9]  Diarrhea, unspecified type [R19.7].     The patient reports having a gastritis \"attack\" after eating a soda and a salad, which she describes sharp stabbing pain in her stomach, accompanied by nausea with no vomiting. She felt that her abdomen was distended as well. The pain continued for 4 days, after which she took prilosec which improved her symptoms. She was taking zantac as needed, usually after eating a trigger food, she t mentioned cheesestake, onions, bread. Only having episodes 1-2 x /month.     Since then she has felt much improved without further reflux or abdominal pain. She generally ignores the reflux sensation but does report that the prilosec helps her to notice by feeling better.     She has had an upper endoscopy, but could not remember the last time it was done. She has colonoscopy in the past, was told she has IBD, but it was acute not chronic. She is due for a colonoscopy per her outpatient records.     She noticed she has been losing weight around 10 lbs and her clothes were loose. She feels that she is eating the same amount, although she is trying to eat more healthy.     She is having a well formed stool but then after will pass loose stools. No blood in her stools. No straining and then none after initial passage of stools.      She has recently been taken off of estridiol but is weaning this off. She had endometriosis with diffuse abdominal involvement and had an oophorectomy. Has not vaginal bleeding.         Past Medical History:   Diagnosis Date   • ADHD    • Anxiety    • GERD (gastroesophageal reflux disease)      Past Surgical History:   Procedure Laterality Date   • HYSTERECTOMY  2008    IZZY 2/2 endometriosis   • WISDOM TOOTH EXTRACTION       Allergies " "  Allergen Reactions   • Amoxicillin        Home Medications:  •  buPROPion SR, Take 150 mg by mouth 2 (two) times a day.  •  escitalopram, Take 10 mg by mouth daily.  •  estradiol, Take 1 mg by mouth daily.  •  traZODone, Take 25 mg by mouth nightly.  •  cyclobenzaprine, Take 10 mg by mouth 3 (three) times a day as needed.  •  omeprazole, Take 20 mg by mouth daily.  •  ranitidine, Take 150 mg by mouth 2 (two) times a day.        Physical Exam  /79   Pulse 75   Temp 36.5 °C (97.7 °F) (Tympanic)   Resp 15   Ht 1.778 m (5' 10\")   Wt 64.4 kg (142 lb)   SpO2 98%   BMI 20.37 kg/m²     General appearance: no distress  Head: normocephalic  Lungs: clear to auscultation anteriorly   Heart: regular rate   Abdomen: soft, non-tender; bowel sounds normal; no masses, no organomegaly  Neurologic: awake and alert and oriented        Assessment   Ongoing reflux and severe pain with eating. Will proceed with EGD    Has change in bowel habits, ongoing diarrhea, has a history of IBD (no documentation, was told it was acute not chronic, possibly colitis) and polyps. Proceed with colonoscopy.             Jimmy Link DO  6/20/2018           "

## 2018-06-21 LAB
CASE RPRT: NORMAL
CLINICAL INFO: NORMAL
PATH REPORT.FINAL DX SPEC: NORMAL
PATH REPORT.GROSS SPEC: NORMAL

## 2018-06-22 ENCOUNTER — DOCUMENTATION (OUTPATIENT)
Dept: INTERNAL MEDICINE | Facility: HOSPITAL | Age: 48
End: 2018-06-22

## 2018-06-22 NOTE — PROGRESS NOTES
Patient had gone EGD on 6/20/2018 by Dr. Bocanegra and Dr. Washburn. Impression for gastritis which was biopsied, also small hiatus hernia. Patho results in 2 weeks.

## 2018-06-22 NOTE — PROGRESS NOTES
Patient had gone EGD and Colonocsopy on 6/20/2018 by Dr. Bocanegra and Dr. Washburn.  EGD: Impression for gastritis which was biopsied, also small hiatus hernia. Patho results in 2 weeks.  Colonoscopy: One 4 mm polyp in the rectum.

## 2018-07-02 ENCOUNTER — DOCUMENTATION (OUTPATIENT)
Dept: GASTROENTEROLOGY | Facility: HOSPITAL | Age: 48
End: 2018-07-02

## 2019-03-22 ENCOUNTER — OFFICE VISIT (OUTPATIENT)
Dept: INTERNAL MEDICINE | Facility: HOSPITAL | Age: 49
End: 2019-03-22
Attending: STUDENT IN AN ORGANIZED HEALTH CARE EDUCATION/TRAINING PROGRAM
Payer: COMMERCIAL

## 2019-03-22 ENCOUNTER — TELEPHONE (OUTPATIENT)
Dept: INTERNAL MEDICINE | Facility: HOSPITAL | Age: 49
End: 2019-03-22

## 2019-03-22 VITALS
HEART RATE: 74 BPM | SYSTOLIC BLOOD PRESSURE: 125 MMHG | RESPIRATION RATE: 18 BRPM | DIASTOLIC BLOOD PRESSURE: 62 MMHG | TEMPERATURE: 96.1 F | WEIGHT: 148 LBS | BODY MASS INDEX: 21.19 KG/M2 | OXYGEN SATURATION: 98 % | HEIGHT: 70 IN

## 2019-03-22 DIAGNOSIS — M79.671 FOOT PAIN, RIGHT: ICD-10-CM

## 2019-03-22 DIAGNOSIS — M25.562 ACUTE PAIN OF LEFT KNEE: Primary | ICD-10-CM

## 2019-03-22 PROCEDURE — 99214 OFFICE O/P EST MOD 30 MIN: CPT | Performed by: INTERNAL MEDICINE

## 2019-03-22 RX ORDER — DICLOFENAC SODIUM 10 MG/G
GEL TOPICAL 2 TIMES DAILY PRN
Qty: 100 G | Refills: 1 | Status: SHIPPED | OUTPATIENT
Start: 2019-03-22 | End: 2019-05-22

## 2019-03-22 RX ORDER — TRAMADOL HYDROCHLORIDE 50 MG/1
50 TABLET ORAL EVERY 6 HOURS PRN
Qty: 20 TABLET | Refills: 0 | Status: SHIPPED | OUTPATIENT
Start: 2019-03-22 | End: 2019-05-03

## 2019-03-22 RX ORDER — ACETAMINOPHEN 500 MG
500 TABLET ORAL EVERY 6 HOURS PRN
Qty: 30 TABLET | Refills: 0 | Status: SHIPPED | OUTPATIENT
Start: 2019-03-22 | End: 2019-09-18

## 2019-03-22 ASSESSMENT — ENCOUNTER SYMPTOMS
CHEST TIGHTNESS: 0
MYALGIAS: 1
COUGH: 0
ARTHRALGIAS: 1
DYSURIA: 0
SORE THROAT: 0
DIARRHEA: 0
CONSTIPATION: 0
ABDOMINAL PAIN: 0
CHILLS: 0
UNEXPECTED WEIGHT CHANGE: 0
JOINT SWELLING: 0
FEVER: 0
SHORTNESS OF BREATH: 0

## 2019-03-22 ASSESSMENT — PAIN SCALES - GENERAL: PAINLEVEL: 3

## 2019-03-22 NOTE — PROGRESS NOTES
"     [facility]  Internal Medicine Progress Note       SUBJECTIVE   Connie Chi is a 48 y.o. year-old female with a past medical history of ADHD, GERD, IBD, and ADAM who presents for knee and foot pain.     Pt has been having L knee pain for 1 month. She rising from a squatting position and she felt a \"snap\" and instant, sharp pain. The knee did not swell. Since then she has had a dull, \"nagging 1/10 pain that is sharp with movement. The pain is worse with going up stair and squatting. It started to improve with knee brace and Alleeve, but got worse after wearing ill fitting shoes.     Now she is experiencing R sided foot pain that is on the sole of her foot that wraps around to her calcaneus and radiates to her calf. She attributes this to wearing boots that were too tight on 3/8. She went to an  on 3/14 and had x-ray of the R foot which showed a bone spur. The physician at the  believed her foot pain is due to plantar fascias. Since then she has been doing foot stretches and noticed some improvement.     She has not had pain previously in either site, but reports a family history of osteoarthritis.       REVIEW OF SYSTEMS   Review of Systems   Constitutional: Negative for chills, fever and unexpected weight change.   HENT: Negative for sneezing and sore throat.    Respiratory: Negative for cough, chest tightness and shortness of breath.    Cardiovascular: Negative for chest pain.   Gastrointestinal: Negative for abdominal pain, constipation and diarrhea.   Genitourinary: Negative for dysuria.   Musculoskeletal: Positive for arthralgias, gait problem and myalgias. Negative for joint swelling.        MEDICATIONS        Current Outpatient Prescriptions:   •  buPROPion SR (WELLBUTRIN SR) 150 mg 12 hr tablet, Take 150 mg by mouth 2 (two) times a day., Disp: , Rfl:   •  escitalopram (LEXAPRO) 10 mg tablet, Take 10 mg by mouth daily., Disp: , Rfl:   •  estradiol (ESTRACE) 1 mg tablet, Take 1 " "mg by mouth daily., Disp: , Rfl:   •  ranitidine (ZANTAC) 150 mg tablet, Take 150 mg by mouth 2 (two) times a day., Disp: , Rfl:   •  traZODone (DESYREL) 50 mg tablet, Take 25 mg by mouth nightly., Disp: , Rfl:   •  acetaminophen (TYLENOL) 500 mg tablet, Take 1 tablet (500 mg total) by mouth every 6 (six) hours as needed for mild pain., Disp: 30 tablet, Rfl: 0  •  cyclobenzaprine (FLEXERIL) 10 mg tablet, Take 10 mg by mouth 3 (three) times a day as needed., Disp: , Rfl:   •  diclofenac sodium (VOLTAREN) 1 % topical gel, Apply topically 2 (two) times a day as needed for mild pain., Disp: 100 g, Rfl: 1  •  omeprazole (PriLOSEC) 20 mg capsule, Take 20 mg by mouth daily., Disp: , Rfl:   •  traMADol (ULTRAM) 50 mg tablet, Take 1 tablet (50 mg total) by mouth every 6 (six) hours as needed for moderate pain or severe pain for up to 10 days., Disp: 20 tablet, Rfl: 0    PHYSICAL EXAMINATION   Vital signs: /62   Pulse 74   Temp (!) 35.6 °C (96.1 °F) (Oral)   Resp 18   Ht 1.778 m (5' 10\")   Wt 67.1 kg (148 lb)   SpO2 98%   BMI 21.24 kg/m²   Physical Exam   Constitutional: She is oriented to person, place, and time. She appears well-developed and well-nourished. No distress.   HENT:   Head: Normocephalic and atraumatic.   Eyes: EOM are normal. Pupils are equal, round, and reactive to light.   Neck: Normal range of motion. Neck supple.   Cardiovascular: Normal rate, regular rhythm and normal heart sounds.  Exam reveals no gallop and no friction rub.    No murmur heard.  Pulmonary/Chest: Effort normal and breath sounds normal. No respiratory distress. She has no wheezes. She has no rales.   Abdominal: Soft. Bowel sounds are normal. She exhibits no distension. There is no tenderness. There is no guarding.   Musculoskeletal: She exhibits no edema.   R foot:   5/5 plantar flexion  5/5 dorsiflexion  5/5 ankle abduction  5/5 ankle adduction  Pain with abduction    L foot 5/5 strength     L Knee:   5/5 flexion  5/5 " extension  Sharp pain with L knee flexion    R knee 5/5 strength extension and flexion    Straight leg gait in L leg    Pain with palpation of medial aspect of patella    Neurological: She is alert and oriented to person, place, and time.   Sensation intact b/l in both feet   Skin: Skin is warm and dry. No rash noted. She is not diaphoretic.   Nursing note and vitals reviewed.       LABS / IMAGING/STUDIES      Labs Reviewed: NA    Studies/Imaging Reviewed: NA        ASSESSMENT AND PLAN      Problem List Items Addressed This Visit     Acute pain of left knee - Primary    Current Assessment & Plan     Pt injured L knee squatting and had sharp pain.   Pain is worse with squatting, going up stairs, and knee flexion.   Pain not resolved with tylenol.     Presentation is consistent with patellar-femoral syndrome, but also concerning for early onset OA.     B/l knee x rays   Extra strength tylenol  Dicoflenac gel  Tramadol for severe pain (0 refills, 20 pills)  Referral for physical therapy          Relevant Orders    Ambulatory referral to Physical Therapy    X-RAY KNEE BILATERAL 4+ VIEWS    Foot pain, right    Current Assessment & Plan     R foot pain worse with dorsiflexion.     Could be contributory from L sided patellar-femoral syndrome, differential also includes plantar fascitis    Physical therapy  Extra strength tylenol  Dicofenac gel  Tramadol 50 mg as needed (0 refills, 20 pills)         Relevant Orders    Ambulatory referral to Physical Therapy           Munira Quintero MD  03/22/19  1:01 PM

## 2019-03-22 NOTE — PATIENT INSTRUCTIONS
Thank you for visiting [facility] today!  Before your next visit please follow the instructions below:    - Please make an appointment with physical therapy for management of your leg and foot pain  - Take Tylenol as needed for pain  - Apply Diflofenac gel to painful areas as needed   - Take Tramadol as needed for EXTREME pain  - AVOID Ibuprofen and other NSAIDs such as Alleeve   - Wear comfortable shoes    It was nice to see you and I hope you enjoy the rest of your day!    Sincerely,  Dr. Munira Quintero MD

## 2019-03-22 NOTE — ASSESSMENT & PLAN NOTE
Pt injured L knee squatting and had sharp pain.   Pain is worse with squatting, going up stairs, and knee flexion.   Pain not resolved with tylenol.     Presentation is consistent with patellar-femoral syndrome, but also concerning for early onset OA.     B/l knee x rays   Extra strength tylenol  Dicoflenac gel  Tramadol for severe pain (0 refills, 20 pills)  Referral for physical therapy

## 2019-03-22 NOTE — ASSESSMENT & PLAN NOTE
R foot pain worse with dorsiflexion.     Could be contributory from L sided patellar-femoral syndrome, differential also includes plantar fascitis    Physical therapy  Extra strength tylenol  Dicofenac gel  Tramadol 50 mg as needed (0 refills, 20 pills)

## 2019-03-22 NOTE — PROGRESS NOTES
"     [facility]  Internal Medicine Progress Note       BRIEF ATTENDING DOCUMENTATION   Please see attestation section of note for attestation and any additional physician documentation not found here.    Type of faculty precepting: In room    S: Here for visit to discuss knee and foot pain. One month of L knee pain after a twisting injury. She has had on again/off again musculoskeletal pain (previously had intercostal muscle pain years ago, did some physical therapy for this and did not have a good experience with PT). She was prescribed tramadol short course in 6/2017 for pain, which she still has one tablet from.     Her knee pain is worse with activity, adonis climbing stairs. She points to the left knee medial joint line as the location of her pain. Her right foot pain started a few days ago, which she attributes to gait change from her knee pain, as well as wearing shoes that are not supportive enough.     Went to urgent care a few days ago for her R foot pain, dx plantar fasciitis, Xrays neg for fx per pt.     A complete review of systems is otherwise negative except as documented in the HPI.    O:  /62   Pulse 74   Temp (!) 35.6 °C (96.1 °F) (Oral)   Resp 18   Ht 1.778 m (5' 10\")   Wt 67.1 kg (148 lb)   SpO2 98%   BMI 21.24 kg/m²   Tearful when talking about her knee pain and experience in PT  L knee without effusion, knees are symmetric. L knee with painless passive ROM, does reproduce pain with isometric flexion against resistance. No laxity of any cruciate ligaments.   R foot with mild tenderness to palpation of the calcaneous    A/P:  # L Knee pain: suspect PFPS, continue APAP, try diclofenac gel. Gave her small number of tramadol (her prior Rx lasted over a year) for emergencies if worse knee pain, but will not refill this and she understands. She is willing to try physical therapy for her knee after much reassurance and encouragement. Check CXR to exclude early OA given strong " family hx.     # R foot pain: do think she's right about pain from gait disturbance 2/2 L knee pain as well as lack of supportive footwear. The support pad in her right shoe is completely worn through. Recommend more supportive footwear and see how foot pain does after PT for her knee. Low suspicion for stress fracture but would would consider if persistent pain after L knee pain improves.     Alessandro Schuler MD

## 2019-03-26 NOTE — TELEPHONE ENCOUNTER
PA for Diclofenac gel has been denied, pt must try and fail Diclofenac tablets, naproxen and Ibuprofen.

## 2019-03-26 NOTE — TELEPHONE ENCOUNTER
She has an underlying condition which make oral NSAIDs unsafe. We can submit the prior authorization again explaining she has iron deficiency anemia due to gastritis, and cannot use oral NSAIDs. Let me know if I need to sign the prior auth.     Alessandro Schuler MD

## 2019-03-27 NOTE — TELEPHONE ENCOUNTER
Thank you Dr. Schuler and Leilani. Let me know if there is anything I can do to help with the prior authorization.     Munira Quintero MD  PGY-1

## 2019-04-16 ENCOUNTER — TELEPHONE (OUTPATIENT)
Dept: INTERNAL MEDICINE | Facility: HOSPITAL | Age: 49
End: 2019-04-16

## 2019-05-03 ENCOUNTER — OFFICE VISIT (OUTPATIENT)
Dept: INTERNAL MEDICINE | Facility: HOSPITAL | Age: 49
End: 2019-05-03
Attending: STUDENT IN AN ORGANIZED HEALTH CARE EDUCATION/TRAINING PROGRAM
Payer: COMMERCIAL

## 2019-05-03 VITALS
SYSTOLIC BLOOD PRESSURE: 123 MMHG | HEIGHT: 70 IN | WEIGHT: 149 LBS | BODY MASS INDEX: 21.33 KG/M2 | DIASTOLIC BLOOD PRESSURE: 60 MMHG | OXYGEN SATURATION: 97 % | HEART RATE: 75 BPM | TEMPERATURE: 97.6 F

## 2019-05-03 DIAGNOSIS — Z00.00 HEALTHCARE MAINTENANCE: Primary | ICD-10-CM

## 2019-05-03 DIAGNOSIS — M25.562 ACUTE PAIN OF LEFT KNEE: ICD-10-CM

## 2019-05-03 DIAGNOSIS — M79.671 FOOT PAIN, RIGHT: ICD-10-CM

## 2019-05-03 DIAGNOSIS — Z72.0 TOBACCO USER: ICD-10-CM

## 2019-05-03 PROCEDURE — 99213 OFFICE O/P EST LOW 20 MIN: CPT | Performed by: INTERNAL MEDICINE

## 2019-05-03 RX ORDER — FLUOXETINE 10 MG/1
10 CAPSULE ORAL DAILY
COMMUNITY
End: 2021-11-16 | Stop reason: ALTCHOICE

## 2019-05-03 NOTE — PATIENT INSTRUCTIONS
Thank you for visiting [facility] today!  Before your next visit please follow the instructions below:    - Please get your blood work   - Please get your knee x rays  - We will call you about test results  - return to office if your symptoms worsen or in 3 - 6 mo    It was nice to see you and I hope you enjoy the rest of your day!    Sincerely,  Dr. Munira Quintero MD

## 2019-05-03 NOTE — PROGRESS NOTES
"     [facility]  Internal Medicine Progress Note       SUBJECTIVE   Connie Chi is a 48 y.o. year-old female with a past medical history of ADHD, GERD, IBD, and ADAM who presents as follow up for knee and foot pain    Pt has been undergoing physical therapy for 3 weeks which has helped but still reports symptoms. She notes that she walks slower and is unable to run. She notes improvement in climbing stairs.  PT has definitely helped with her pain. She reports that her pain has been mostly under control and she has not had to use the tramadol. She feels 25% better than when she was previously seen at clinic. Her pain is now in her R foot, L knee, and L hip.     Additionally, she notes that she is having issues sleeping. She feels that her \"brain won't turn off\" and she will stay up until 3-4 am. She notes poor sleep hygiene. Her pshyciatrist recently left the office and she is awaiting a new provider. She follows with [facility]. She reports that her mood has been even and denies SI.      She knows that she needs to quit smoking and is interested in quitting. She is concerned about cardiac disease (Mother with A fib, Father with MI at age 67). She has occasional, \"gnawing\" L sided rib pain that comes at rest and goes away.      REVIEW OF SYSTEMS   Review of Systems  + L knee pain  + L hip pain    Pt denies fever, chills, headache, vision changes, lightheadedness, hearing changes, sore throat, sneezing, cough, SOB, CP, Abd pain, N/V/D, constipation, dysuria, and rashes/ skin changes.      MEDICATIONS        Current Outpatient Prescriptions:   •  acetaminophen (TYLENOL) 500 mg tablet, Take 1 tablet (500 mg total) by mouth every 6 (six) hours as needed for mild pain., Disp: 30 tablet, Rfl: 0  •  buPROPion SR (WELLBUTRIN SR) 150 mg 12 hr tablet, Take 150 mg by mouth 2 (two) times a day., Disp: , Rfl:   •  estradiol (ESTRACE) 1 mg tablet, Take 1 mg by mouth daily., Disp: , " "Rfl:   •  FLUoxetine (PROzac) 10 mg capsule, Take 10 mg by mouth daily., Disp: , Rfl:   •  ranitidine (ZANTAC) 150 mg tablet, Take 150 mg by mouth 2 (two) times a day., Disp: , Rfl:   •  traMADol (ULTRAM) 50 mg tablet, Take 1 tablet (50 mg total) by mouth every 6 (six) hours as needed for moderate pain or severe pain for up to 10 days., Disp: 20 tablet, Rfl: 0  •  traZODone (DESYREL) 50 mg tablet, Take 25 mg by mouth nightly., Disp: , Rfl:   •  cyclobenzaprine (FLEXERIL) 10 mg tablet, Take 10 mg by mouth 3 (three) times a day as needed., Disp: , Rfl:   •  diclofenac sodium (VOLTAREN) 1 % topical gel, Apply topically 2 (two) times a day as needed for mild pain., Disp: 100 g, Rfl: 1  •  omeprazole (PriLOSEC) 20 mg capsule, Take 20 mg by mouth daily., Disp: , Rfl:     PHYSICAL EXAMINATION   Vital signs: /60   Pulse 75   Temp 36.4 °C (97.6 °F)   Ht 1.778 m (5' 10\")   Wt 67.6 kg (149 lb)   SpO2 97%   BMI 21.38 kg/m²   Physical Exam   Constitutional: She is oriented to person, place, and time. She appears well-developed and well-nourished. No distress.   HENT:   Head: Normocephalic and atraumatic.   Eyes: Pupils are equal, round, and reactive to light. EOM are normal.   Neck: Normal range of motion. Neck supple.   Cardiovascular: Normal rate, regular rhythm and normal heart sounds.  Exam reveals no gallop and no friction rub.    No murmur heard.  Pulmonary/Chest: Effort normal and breath sounds normal. No respiratory distress. She has no wheezes. She has no rales.   Abdominal: Soft. Bowel sounds are normal. She exhibits no distension. There is no tenderness. There is no guarding.   Musculoskeletal: She exhibits no edema.   Neurological: She is alert and oriented to person, place, and time.   5/5 strength in lower extremities  Normal gait  Tenderness to palpation of L patella    Skin: Skin is warm and dry. No rash noted. She is not diaphoretic.   Psychiatric: She has a normal mood and affect. Her behavior is " normal.   Nursing note and vitals reviewed.       LABS / IMAGING/STUDIES      Labs Reviewed: GENEVIEVE    Studies/Imaging Reviewed: GENEVIEVE        ASSESSMENT AND PLAN      Problem List Items Addressed This Visit     Tobacco user    Overview     Overview:          Current Assessment & Plan     Pt with long history of smoking.   Has quit in the past.   Is interested in quitting in the future.     - Discussed the importance of setting a quit date in the near future  - Provided patient with information about smoking cessation         Acute pain of left knee    Current Assessment & Plan     Pt continues to have L knee pain which now includes her L hip.   She reports improvement with physical therapy.     - Continue physical therapy  - Tylenol as needed for pain  - Instructed patient to go get her knee x rays           Foot pain, right    Current Assessment & Plan     Improved.     - Continue PT  - Tylenol for pain control          Healthcare maintenance - Primary    Current Assessment & Plan     Pt with Hx fo GERD and smoking.     - deferred PNA vaccine today  - lipid panel given increase risk of CVD with smoking and family history  - will discuss pap smear at next visit         Relevant Orders    Lipid panel    TSH w reflex FT4           HEALTHCARE MAINTENANCE   Health Maintenance Due   Topic Date Due   • Pneumococcal (0-64 years) (1 of 1 - PPSV23) 10/27/1976          Munira Quintero MD  05/03/19  3:12 PM

## 2019-05-03 NOTE — ASSESSMENT & PLAN NOTE
Pt with Hx fo GERD and smoking.     - deferred PNA vaccine today  - lipid panel given increase risk of CVD with smoking and family history  - will discuss pap smear at next visit

## 2019-05-03 NOTE — PROGRESS NOTES
"     [facility]  Internal Medicine Progress Note       BRIEF ATTENDING DOCUMENTATION   Please see attestation section of note for attestation and any additional physician documentation not found here.    Type of faculty precepting: Excemption    S: Doing PT, helping a little although still having some knee and hip pain.   Seeing psychiatry, now on fluoxetine. Having racing thoughts at night, some insomnia.   Asking about cardiac risk.     A complete review of systems is otherwise negative except as documented in the HPI.    O:  /60   Pulse 75   Temp 36.4 °C (97.6 °F)   Ht 1.778 m (5' 10\")   Wt 67.6 kg (149 lb)   SpO2 97%   BMI 21.38 kg/m²       A/P:  # Knee/hip pain: continues to improve with PT, replace insole support if not already done. Rare OTC meds and not using tramadol.   # Depression: continue following with psych; no changes today (mood stable), TSH  # CAD risk: check lipids, wants to quit smoking but not now, continue to assess readiness to quit.       Alessandro Schuler MD           "

## 2019-05-03 NOTE — ASSESSMENT & PLAN NOTE
Pt with long history of smoking.   Has quit in the past.   Is interested in quitting in the future.     - Discussed the importance of setting a quit date in the near future  - Provided patient with information about smoking cessation

## 2019-05-03 NOTE — ASSESSMENT & PLAN NOTE
Pt continues to have L knee pain which now includes her L hip.   She reports improvement with physical therapy.     - Continue physical therapy  - Tylenol as needed for pain  - Instructed patient to go get her knee x rays

## 2019-05-22 ENCOUNTER — HOSPITAL ENCOUNTER (EMERGENCY)
Facility: HOSPITAL | Age: 49
Discharge: HOME | End: 2019-05-22
Attending: EMERGENCY MEDICINE
Payer: COMMERCIAL

## 2019-05-22 VITALS
BODY MASS INDEX: 20.81 KG/M2 | SYSTOLIC BLOOD PRESSURE: 154 MMHG | DIASTOLIC BLOOD PRESSURE: 82 MMHG | OXYGEN SATURATION: 98 % | TEMPERATURE: 98.4 F | RESPIRATION RATE: 20 BRPM | WEIGHT: 145 LBS | HEART RATE: 78 BPM

## 2019-05-22 DIAGNOSIS — R45.86 MOOD CHANGES: Primary | ICD-10-CM

## 2019-05-22 LAB
AMPHET UR QL SCN: ABNORMAL
ANION GAP SERPL CALC-SCNC: 10 MEQ/L (ref 3–15)
APAP SERPL-MCNC: <10 UG/ML (ref 10–30)
BARBITURATES UR QL SCN: ABNORMAL
BASOPHILS # BLD: 0.04 K/UL (ref 0.01–0.1)
BASOPHILS NFR BLD: 0.5 %
BENZODIAZ UR QL SCN: ABNORMAL
BUN SERPL-MCNC: 9 MG/DL (ref 8–20)
CALCIUM SERPL-MCNC: 8.8 MG/DL (ref 8.9–10.3)
CANNABINOIDS UR QL SCN: POSITIVE
CHLORIDE SERPL-SCNC: 102 MEQ/L (ref 98–109)
CO2 SERPL-SCNC: 23 MEQ/L (ref 22–32)
COCAINE UR QL SCN: ABNORMAL
CREAT SERPL-MCNC: 1 MG/DL
DIFFERENTIAL METHOD BLD: NORMAL
EOSINOPHIL # BLD: 0.27 K/UL (ref 0.04–0.36)
EOSINOPHIL NFR BLD: 3.2 %
ERYTHROCYTE [DISTWIDTH] IN BLOOD BY AUTOMATED COUNT: 13.5 % (ref 11.7–14.4)
ETHANOL SERPL-MCNC: <5 MG/DL
GFR SERPL CREATININE-BSD FRML MDRD: 59.2 ML/MIN/1.73M*2
GLUCOSE SERPL-MCNC: 97 MG/DL (ref 70–99)
HCT VFR BLDCO AUTO: 37.8 %
HGB BLD-MCNC: 12.1 G/DL
IMM GRANULOCYTES # BLD AUTO: 0.02 K/UL (ref 0–0.08)
IMM GRANULOCYTES NFR BLD AUTO: 0.2 %
LYMPHOCYTES # BLD: 3.11 K/UL (ref 1.2–3.5)
LYMPHOCYTES NFR BLD: 36.4 %
MCH RBC QN AUTO: 27.6 PG (ref 28–33.2)
MCHC RBC AUTO-ENTMCNC: 32 G/DL (ref 32.2–35.5)
MCV RBC AUTO: 86.1 FL (ref 83–98)
MONOCYTES # BLD: 0.35 K/UL (ref 0.28–0.8)
MONOCYTES NFR BLD: 4.1 %
NEUTROPHILS # BLD: 4.76 K/UL (ref 1.7–7)
NEUTS SEG NFR BLD: 55.6 %
NRBC BLD-RTO: 0 %
OPIATES UR QL SCN: ABNORMAL
PCP UR QL SCN: ABNORMAL
PDW BLD AUTO: 10.5 FL (ref 9.4–12.3)
PLATELET # BLD AUTO: 260 K/UL
POTASSIUM SERPL-SCNC: 3.9 MEQ/L (ref 3.6–5.1)
RBC # BLD AUTO: 4.39 M/UL (ref 3.93–5.22)
SALICYLATES SERPL-MCNC: <4 MG/DL
SODIUM SERPL-SCNC: 135 MEQ/L (ref 136–144)
WBC # BLD AUTO: 8.55 K/UL

## 2019-05-22 PROCEDURE — 80048 BASIC METABOLIC PNL TOTAL CA: CPT | Performed by: PHYSICIAN ASSISTANT

## 2019-05-22 PROCEDURE — 99283 EMERGENCY DEPT VISIT LOW MDM: CPT | Mod: U5

## 2019-05-22 PROCEDURE — G0480 DRUG TEST DEF 1-7 CLASSES: HCPCS | Performed by: PHYSICIAN ASSISTANT

## 2019-05-22 PROCEDURE — 85025 COMPLETE CBC W/AUTO DIFF WBC: CPT | Performed by: PHYSICIAN ASSISTANT

## 2019-05-22 PROCEDURE — 36415 COLL VENOUS BLD VENIPUNCTURE: CPT | Performed by: PHYSICIAN ASSISTANT

## 2019-05-22 PROCEDURE — 80307 DRUG TEST PRSMV CHEM ANLYZR: CPT | Performed by: PHYSICIAN ASSISTANT

## 2019-05-22 NOTE — ED ATTESTATION NOTE
I have personally seen and examined the patient.  I reviewed and agree with physician assistant / nurse practitioner’s assessment and plan of care, with the following exceptions: None  My examination, assessment, and plan of care of Connie Chi is as follows:     PT had her meds changed 2 months ago, and since then, feels like she can't sleep, can't concentrate, feels depressed and cries a lot. Denies any SI.  She is waiting for a psych appointment, but unable to get one, she would like her meds adjusted.  Exam: NAD, tearful at times.       Duncan Tomlinson MD  05/22/19 1946

## 2019-05-22 NOTE — ED PROVIDER NOTES
HPI     Chief Complaint   Patient presents with   • Psychiatric Evaluation       48-year-old female with a history of ADHD anxiety depression acid reflux with a chief complaint of mood swings x1 month.  Patient states roughly over a month ago her psychiatrist changed her medication from Lexapro to Prozac as well as increase her Wellbutrin dosage.  Patient also believes she is currently going through menopause.  Patient states since this medication change she has had worsening mood swings with outbursts of crying, feels like she cannot concentrate sleep or do anything she used to be able to do appropriately.  Patient feels very anxious all the time.  Patient's psychiatrist left the practice that she went to and she has not been able to see someone for a month.  Patient denies thoughts of when to harm others or suicidal ideations.  Patient denies any substance abuse issues.  Patient does states she smokes marijuana.  Patient is here in the ER because she wants some recommendations for medication adjustment as she feels that her behavioral changes are acutely related to her change in medication.             Patient History     Past Medical History:   Diagnosis Date   • ADHD    • Anxiety    • GERD (gastroesophageal reflux disease)        Past Surgical History:   Procedure Laterality Date   • HYSTERECTOMY  2008    IZZY 2/2 endometriosis   • WISDOM TOOTH EXTRACTION         Family History   Problem Relation Age of Onset   • Heart disease Father        Social History   Substance Use Topics   • Smoking status: Current Every Day Smoker     Packs/day: 0.50     Years: 30.00   • Smokeless tobacco: Never Used   • Alcohol use No       Systems Reviewed from Nursing Triage:          Review of Systems     Review of Systems   All other systems reviewed and are negative.       Physical Exam     ED Triage Vitals [05/22/19 1837]   Temp Heart Rate Resp BP SpO2   36.9 °C (98.4 °F) 85 14 111/63 99 %      Temp Source Heart Rate Source Patient  Position BP Location FiO2 (%) (Set)   Tympanic -- -- -- --                     Patient Vitals for the past 24 hrs:   BP Temp Temp src Pulse Resp SpO2 Weight   05/22/19 2214 (!) 154/82 - - 78 20 98 % -   05/22/19 1837 111/63 36.9 °C (98.4 °F) Tympanic 85 14 99 % 65.8 kg (145 lb)           Physical Exam   Constitutional: She appears well-developed and well-nourished. No distress.   HENT:   Head: Normocephalic and atraumatic.   Eyes: Conjunctivae are normal.   Neck: Neck supple.   Cardiovascular: Normal rate and regular rhythm.    No murmur heard.  Pulmonary/Chest: Effort normal and breath sounds normal. No respiratory distress.   Abdominal: Soft. There is no tenderness.   Musculoskeletal: She exhibits no edema.   Neurological: She is alert.   Skin: Skin is warm and dry.   Psychiatric: Her mood appears anxious. Her speech is rapid and/or pressured. She is hyperactive.   Tearful   Nursing note and vitals reviewed.           Procedures    ED Course & MDM     Labs Reviewed   BASIC METABOLIC PANEL - Abnormal        Result Value    Sodium 135 (*)     Potassium 3.9      Chloride 102      CO2 23      BUN 9      Creatinine 1.0      Glucose 97      Calcium 8.8 (*)     eGFR 59.2 (*)     Anion Gap 10     DRUG SCREEN PANEL, URINE - Abnormal     PCP Scrn, Ur None Detected      Benzodiazepine Ur Qual None Detected      Cocaine Screen, Urine None Detected      Amphetamine+Methamphetamine Screen, Ur None Detected      Cannabinoid Screen, Urine Positive (*)     Opiate Scrn, Ur None Detected      Barbiturate Screen, Ur None Detected     ER TOXICOLOGY SCR, SERUM - Abnormal     Salicylate <4.0      Acetaminophen <10.0 (*)     Ethanol <5     CBC - Abnormal     WBC 8.55      RBC 4.39      Hemoglobin 12.1      Hematocrit 37.8      MCV 86.1      MCH 27.6 (*)     MCHC 32.0 (*)     RDW 13.5      Platelets 260      MPV 10.5     CBC AND DIFFERENTIAL    Narrative:     The following orders were created for panel order CBC and  differential.  Procedure                               Abnormality         Status                     ---------                               -----------         ------                     CBC[[id number]]                           Abnormal            Final result               Diff Count[[id number]]                                        Final result                 Please view results for these tests on the individual orders.   DIFF COUNT    Differential Type Auto      nRBC 0.0      Immature Granulocytes 0.2      Neutrophils 55.6      Lymphocytes 36.4      Monocytes 4.1      Eosinophils 3.2      Basophils 0.5      Immature Granulocytes, Absolute 0.02      Neutrophils, Absolute 4.76      Lymphocytes, Absolute 3.11      Monocytes, Absolute 0.35      Eosinophils, Absolute 0.27      Basophils, Absolute 0.04         No orders to display               MDM         ED Course as of May 22 2255   Wed May 22, 2019   2209 Patietn very anxious, stating she wants to leave, stilll denies SI/HI; infomremd her she can return if she needs to  [EA]      ED Course User Index  [EA] Jenifer Cohen PA C         Clinical Impressions as of May 22 2255   Mood changes        Jenifer Cohen PA C  05/22/19 2252

## 2019-06-12 ENCOUNTER — TELEPHONE (OUTPATIENT)
Dept: INTERNAL MEDICINE | Facility: HOSPITAL | Age: 49
End: 2019-06-12

## 2019-06-12 NOTE — TELEPHONE ENCOUNTER
Patient was introduced to [facility], no barriers were identified.  -Patient states- I do not have any social needs at this time.   -Patient was given my contact number.  -Patient agrees to contact a member of [facility] if a new social need shall arise.  -Will continue to monitor patient.

## 2019-06-25 ENCOUNTER — TELEPHONE (OUTPATIENT)
Dept: INTERNAL MEDICINE | Facility: HOSPITAL | Age: 49
End: 2019-06-25

## 2019-06-25 DIAGNOSIS — Z12.31 ENCOUNTER FOR SCREENING MAMMOGRAM FOR BREAST CANCER: Primary | ICD-10-CM

## 2019-06-25 NOTE — TELEPHONE ENCOUNTER
Chart Review:  Last ED visit 5/22/2019 for mood changes  Last office visit 5/3/2019    Health maintenance up to date:  -Tdap    Please mail mammogram script and ask patient to schedule appointment.

## 2020-03-06 ENCOUNTER — DOCUMENTATION (OUTPATIENT)
Dept: INTERNAL MEDICINE | Facility: HOSPITAL | Age: 50
End: 2020-03-06

## 2020-03-06 NOTE — PROGRESS NOTES
Krissy Irving MA has completed a chart review for Connie Chi and have determined that the care gap has been satisfied.    Care Gap Source:: ([facility] )    Care Gap(s) Identified:: Cervical Cancer Screening    Chart Review Completed:: Yes     Per Sra[facility] Cage pt is due for cervical cancer screening, pt had IZZY in 2008. No outreach needed at this time.

## 2020-09-01 ENCOUNTER — DOCUMENTATION (OUTPATIENT)
Dept: INTERNAL MEDICINE | Facility: HOSPITAL | Age: 50
End: 2020-09-01

## 2020-09-01 NOTE — PROGRESS NOTES
Krissy Irving MA has completed a chart review for Connie Chi and have determined that the care gap has been satisfied.    Care Gap Source:: ([facility] )    Care Gap(s) Identified:: Cervical Cancer Screening    Chart Review Completed:: Yes     Per [facility] pt is due for cervical cancer screening, pt had IZZY in 2008. No outreach needed at this time.

## 2021-08-31 ENCOUNTER — APPOINTMENT (RX ONLY)
Dept: URBAN - METROPOLITAN AREA CLINIC 28 | Facility: CLINIC | Age: 51
Setting detail: DERMATOLOGY
End: 2021-08-31

## 2021-08-31 DIAGNOSIS — D485 NEOPLASM OF UNCERTAIN BEHAVIOR OF SKIN: ICD-10-CM

## 2021-08-31 PROBLEM — D48.5 NEOPLASM OF UNCERTAIN BEHAVIOR OF SKIN: Status: ACTIVE | Noted: 2021-08-31

## 2021-08-31 PROCEDURE — ? BIOPSY BY SHAVE METHOD

## 2021-08-31 PROCEDURE — 11102 TANGNTL BX SKIN SINGLE LES: CPT

## 2021-08-31 PROCEDURE — ? PHOTO-DOCUMENTATION

## 2021-08-31 PROCEDURE — 11103 TANGNTL BX SKIN EA SEP/ADDL: CPT

## 2021-08-31 ASSESSMENT — LOCATION SIMPLE DESCRIPTION DERM
LOCATION SIMPLE: LEFT SHOULDER
LOCATION SIMPLE: LEFT PRETIBIAL REGION

## 2021-08-31 ASSESSMENT — LOCATION DETAILED DESCRIPTION DERM
LOCATION DETAILED: LEFT ANTERIOR SHOULDER
LOCATION DETAILED: LEFT PROXIMAL PRETIBIAL REGION

## 2021-08-31 ASSESSMENT — LOCATION ZONE DERM
LOCATION ZONE: ARM
LOCATION ZONE: LEG

## 2021-08-31 NOTE — PROCEDURE: MIPS QUALITY
Quality 431: Preventive Care And Screening: Unhealthy Alcohol Use - Screening: Patient not identified as an unhealthy alcohol user when screened for unhealthy alcohol use using a systematic screening method
Quality 226: Preventive Care And Screening: Tobacco Use: Screening And Cessation Intervention: Patient screened for tobacco use, is a smoker AND received Cessation Counseling
Detail Level: Detailed

## 2021-09-13 ENCOUNTER — OFFICE VISIT (OUTPATIENT)
Dept: INTERNAL MEDICINE | Facility: HOSPITAL | Age: 51
End: 2021-09-13
Attending: STUDENT IN AN ORGANIZED HEALTH CARE EDUCATION/TRAINING PROGRAM
Payer: COMMERCIAL

## 2021-09-13 VITALS
DIASTOLIC BLOOD PRESSURE: 69 MMHG | HEART RATE: 85 BPM | TEMPERATURE: 97.2 F | OXYGEN SATURATION: 98 % | RESPIRATION RATE: 20 BRPM | BODY MASS INDEX: 20.27 KG/M2 | HEIGHT: 70 IN | WEIGHT: 141.6 LBS | SYSTOLIC BLOOD PRESSURE: 112 MMHG

## 2021-09-13 DIAGNOSIS — Z00.00 HEALTHCARE MAINTENANCE: ICD-10-CM

## 2021-09-13 DIAGNOSIS — F41.9 ANXIETY: ICD-10-CM

## 2021-09-13 DIAGNOSIS — Z72.0 TOBACCO USER: ICD-10-CM

## 2021-09-13 DIAGNOSIS — Z00.00 ANNUAL PHYSICAL EXAM: Primary | ICD-10-CM

## 2021-09-13 DIAGNOSIS — J32.0 MAXILLARY SINUSITIS, UNSPECIFIED CHRONICITY: ICD-10-CM

## 2021-09-13 DIAGNOSIS — J44.9 MILD CHRONIC OBSTRUCTIVE PULMONARY DISEASE (CMS/HCC): ICD-10-CM

## 2021-09-13 DIAGNOSIS — D50.0 IRON DEFICIENCY ANEMIA DUE TO CHRONIC BLOOD LOSS: ICD-10-CM

## 2021-09-13 PROCEDURE — 3008F BODY MASS INDEX DOCD: CPT | Performed by: FAMILY MEDICINE

## 2021-09-13 PROCEDURE — 99396 PREV VISIT EST AGE 40-64: CPT | Mod: GC | Performed by: FAMILY MEDICINE

## 2021-09-13 RX ORDER — DIPHENHYDRAMINE HCL 25 MG
4 CAPSULE ORAL AS NEEDED
Qty: 100 EACH | Refills: 1 | Status: SHIPPED | OUTPATIENT
Start: 2021-09-13 | End: 2021-11-16 | Stop reason: ALTCHOICE

## 2021-09-13 ASSESSMENT — PAIN SCALES - GENERAL: PAINLEVEL: 0-NO PAIN

## 2021-09-13 ASSESSMENT — PATIENT HEALTH QUESTIONNAIRE - PHQ9: SUM OF ALL RESPONSES TO PHQ9 QUESTIONS 1 & 2: 0

## 2021-09-13 NOTE — ASSESSMENT & PLAN NOTE
"Hx of sinus \"issues\" for several years, father also has problems with sinuses  Also history of poor dentition   No erythema or exudates on exam, some maxillary tenderness to palpation    -referral to ENT for further work up  "

## 2021-09-13 NOTE — ASSESSMENT & PLAN NOTE
Hx of ADAM, used to take iron but not currently  Last colonoscopy in 2018  No active bleeding or symptoms  Hgb 12.2 in 2018    - CBC  - iron levels  - if ADAM will start iron and provide referral to GI for EGD/colo

## 2021-09-13 NOTE — ASSESSMENT & PLAN NOTE
Pt with hx of anxiety/ depression/ ADHD.   Follows with a psychiatrist and therapist    -continue therapy  - medications per psychiatry

## 2021-09-13 NOTE — PROGRESS NOTES
"     [facility]  Internal Medicine Progress Note       ASSESSMENT AND PLAN     Anxiety  Pt with hx of anxiety/ depression/ ADHD.   Follows with a psychiatrist and therapist    -continue therapy  - medications per psychiatry    Iron deficiency anemia due to chronic blood loss  Hx of ADAM, used to take iron but not currently  Last colonoscopy in 2018  No active bleeding or symptoms  Hgb 12.2 in 2018    - CBC  - iron levels  - if ADAM will start iron and provide referral to GI for EGD/colo    Maxillary sinusitis  Hx of sinus \"issues\" for several years, father also has problems with sinuses  Also history of poor dentition   No erythema or exudates on exam, some maxillary tenderness to palpation    -referral to ENT for further work up    Tobacco user  Hx of smoking since age 12  Has tried to quit in the past  Currently smoking 15 cigarettes a day    - NRT provided  - will avoid Chantix given patient is on mood medications  - encouraged patient to pick a quit date  - will order lipid panel as patient does have risks for cardiovascular disease as an active smoker    Annual physical exam  Pt with hx of ADHD/anxiety/depression, GERD, smoking, and ADAM who presents today for an annual appointment  VSS today  Exam is normal besides some poor dentition    -was given mammogram script and pap smears by GYN  - due for CRC in 2028  -  Vaccinated for COVID in 5/2021, is deferring other vaccines at this time        Health care maintenance: f/u HIV and Hep C    No follow-ups on file.  At next visit: f/u HIV and Hep C, Check lipid panel/ vit D/ TSH, check on mood as seasons change, encourage smoking cessation     Munira Quintero MD     SUBJECTIVE     Connie Chi is a 50 y.o. year-old female, primary patient of Dr. Quintero with a past medical history of ADHD, GERD, IBD, and ADAM who presents for a follow up visit.    Since last seen in the office, she reports no major hospitalizations or major illnesses. She received " "antibiotics for an episode of sinusitis. She reports having issues with her sinuses over the years including sinus pressure, sensitivity to cold and pressure changes. She has known dental disease and follows with a dentist who believes her issues are more sinus related than dental.     She received her Covid vaccination. She got Nishant and Nishant vaccine in May 2021, due for booster in December.     Smoking  Continues to smoke. The is interested in quitting. She has tried to quit in the past in college. Now she smokes 15 cigarettes a day. She attributes this increase to stress. She is interested in NRT. She has an allergy to adhesives. She has been smoking since she was 12 years old and would smoke 5-15 cigarettes a day.     Mood  Continues to see her therapist and psychiatrist. She did ketamine infusion therapy which she thinks helps. She notes her mood is getting slightly worse with the change in weather.     HCM  Mammogram script from GYN  CRC due 6/2028  Pap smears s/p hystectomy  HIV - will order  Hep C - will order  Singles defer  PNA defer  COVID vaaccine- got 5/2021  Tdap due 2027      PHYSICAL EXAMINATION     Visit Vitals  /69 (BP Location: Left upper arm, Patient Position: Sitting)   Pulse 85   Temp 36.2 °C (97.2 °F) (Temporal)   Resp 20   Ht 1.778 m (5' 10\")   Wt 64.2 kg (141 lb 9.6 oz)   SpO2 98%   BMI 20.32 kg/m²       Physical Exam  PHYSICAL EXAM  Vitals: Visit Vitals  /69 (BP Location: Left upper arm, Patient Position: Sitting)   Pulse 85   Temp 36.2 °C (97.2 °F) (Temporal)   Resp 20   Ht 1.778 m (5' 10\")   Wt 64.2 kg (141 lb 9.6 oz)   SpO2 98%   BMI 20.32 kg/m²      General: NAD, well-appearing   HEENT: Oropharyx clear and without exudates  Missing teeth (molars on R upper), several fillings, some tartar, but no carries or active gum disease.    Eyes: Conjunctiva clear  EOMI   Cardiac: RRR  No murmurs, rubs, or gallops   Pulmonary: Clear to auscultation bilaterally  No respiratory " distress   Abdomen: Soft, non-tender, +BS  No rebound, no guarding   MSK: No joint deformity   Extremities: No peripheral edema   Neuro: AAO x 3, non-focal  Moves all extremities equally   Psych: Appropriate, cooperative   Skin: No visible rashes               LABS / IMAGING / STUDIES        Labs Reviewed: Hgb 12., Iron sat 12 5/2018    Studies/Imaging Reviewed: NA      MEDICATIONS      Current Outpatient Medications   Medication Instructions   • buPROPion SR (WELLBUTRIN SR) 200 mg, oral, Daily   • estradioL (ESTRACE) 1 mg, oral, Daily   • FLUoxetine (PROZAC) 10 mg, oral, Daily   • nicotine polacrilex (NICORETTE) 4 mg, buccal, As needed   • ranitidine (ZANTAC) 150 mg, oral, 2 times daily   • traZODone (DESYREL) 50 mg, oral, Nightly PRN

## 2021-09-13 NOTE — ASSESSMENT & PLAN NOTE
Pt with hx of ADHD/anxiety/depression, GERD, smoking, and ADAM who presents today for an annual appointment  VSS today  Exam is normal besides some poor dentition    -was given mammogram script and pap smears by GYN  - due for CRC in 2028  -  Vaccinated for COVID in 5/2021, is deferring other vaccines at this time

## 2021-09-13 NOTE — PATIENT INSTRUCTIONS
Thank you for visiting [facility] today!  Before your next visit please follow the instructions below:    -Continue diet and exercise  - Try to quit smoking! You can do it!  - Please go to the lab and get your blood work  - Return to the office in 6 months or sooner if needed     It was nice to see you and I hope you enjoy the rest of your day!    Sincerely,  Dr. Munira Quintero MD

## 2021-09-13 NOTE — ASSESSMENT & PLAN NOTE
Hx of smoking since age 12  Has tried to quit in the past  Currently smoking 15 cigarettes a day    - NRT provided  - will avoid Chantix given patient is on mood medications  - encouraged patient to pick a quit date  - will order lipid panel as patient does have risks for cardiovascular disease as an active smoker

## 2021-10-05 ENCOUNTER — APPOINTMENT (RX ONLY)
Dept: URBAN - METROPOLITAN AREA CLINIC 28 | Facility: CLINIC | Age: 51
Setting detail: DERMATOLOGY
End: 2021-10-05

## 2021-10-05 PROBLEM — D04.62 CARCINOMA IN SITU OF SKIN OF LEFT UPPER LIMB, INCLUDING SHOULDER: Status: ACTIVE | Noted: 2021-10-05

## 2021-10-05 PROCEDURE — 17261 DSTRJ MAL LES T/A/L .6-1.0CM: CPT

## 2021-10-05 PROCEDURE — ? CRYOSURGICAL DESTRUCTION

## 2021-10-05 PROCEDURE — ? COUNSELING

## 2021-10-05 NOTE — PROCEDURE: CRYOSURGICAL DESTRUCTION
Detail Level: Detailed
Size Of Lesion In Cm: 0.8
Add Intralesional Injection: No
Medication Injected: 5-Fluorouracil
Concentration (Mg/Ml Or Millions Of Plaque Forming Units/Cc): 0.01
Total Volume (Ccs): 1
Anesthesia Volume In Cc: 0
Number Of Freeze-Thaw Cycles: 3 freeze-thaw cycles
Total Time In Minutes: 2 minutes
Additional Information: (Optional): The wound was cleaned, and a dressing was applied.  The patient received detailed post-op instructions.
Pre-Procedure: The surgical site was antiseptically prepared.
Consent was obtained from the patient. The risks and benefits to therapy were discussed in detail. Specifically, the risks of infection, scarring, bleeding, prolonged wound healing, incomplete removal, allergy to anesthesia, nerve injury and recurrence were addressed. Alternatives to liquid nitrogen, such as ED&C, surgical removal, XRT were also discussed.  Prior to the procedure, the treatment site was clearly identified and confirmed by the patient. All components of Universal Protocol/PAUSE Rule completed.
Render Post-Care In The Note: Yes
Post-Care Instructions: I reviewed with the patient in detail post-care instructions. Patient is to keep the area dry for 48 hours, and not to engage in any heavy lifting, exercise, or swimming for the next 14 days. Should the patient develop any fevers, chills, bleeding, severe pain patient will contact the office immediately.
Bill As A Line Item Or As Units: Line Item

## 2021-10-19 ENCOUNTER — TELEPHONE (OUTPATIENT)
Dept: INTERNAL MEDICINE | Facility: HOSPITAL | Age: 51
End: 2021-10-19

## 2021-10-19 NOTE — TELEPHONE ENCOUNTER
Pt called and stated she could not reach the ENT physician on the referral, I advised her to reach out to her insurance to have them send her a list of providers with on her network but she seemed confused when I explained to her. She is asking for another referral to an ENT that you know

## 2021-11-11 NOTE — TELEPHONE ENCOUNTER
Called patient to follow up on ENT referral. The office she had called was not helpful. I encouraged her to call her insurance provider for a list of ENT providers covered by [facility]. She was agreeable to this plan.     Munira Quintero MD  PGY3  Internal Medicine

## 2021-11-16 ENCOUNTER — HOSPITAL ENCOUNTER (EMERGENCY)
Facility: HOSPITAL | Age: 51
Discharge: HOME | End: 2021-11-17
Attending: STUDENT IN AN ORGANIZED HEALTH CARE EDUCATION/TRAINING PROGRAM
Payer: COMMERCIAL

## 2021-11-16 VITALS
TEMPERATURE: 97.4 F | SYSTOLIC BLOOD PRESSURE: 139 MMHG | DIASTOLIC BLOOD PRESSURE: 67 MMHG | RESPIRATION RATE: 18 BRPM | HEIGHT: 70 IN | WEIGHT: 145 LBS | BODY MASS INDEX: 20.76 KG/M2 | OXYGEN SATURATION: 99 % | HEART RATE: 61 BPM

## 2021-11-16 DIAGNOSIS — R10.32 LEFT LOWER QUADRANT ABDOMINAL PAIN: Primary | ICD-10-CM

## 2021-11-16 DIAGNOSIS — R19.7 DIARRHEA, UNSPECIFIED TYPE: ICD-10-CM

## 2021-11-16 LAB
ALBUMIN SERPL-MCNC: 3.9 G/DL (ref 3.4–5)
ALP SERPL-CCNC: 68 IU/L (ref 35–126)
ALT SERPL-CCNC: 14 IU/L (ref 11–54)
AMORPH URATE CRY URNS QL MICRO: 1 /HPF
ANION GAP SERPL CALC-SCNC: 5 MEQ/L (ref 3–15)
AST SERPL-CCNC: 15 IU/L (ref 15–41)
BACTERIA #/AREA URNS HPF: 1 /HPF
BASOPHILS # BLD: 0.06 K/UL (ref 0.01–0.1)
BASOPHILS NFR BLD: 0.7 %
BILIRUB SERPL-MCNC: 0.3 MG/DL (ref 0.3–1.2)
BILIRUB UR QL STRIP.AUTO: 1 MG/DL
BUN SERPL-MCNC: 8 MG/DL (ref 8–20)
CALCIUM SERPL-MCNC: 9.3 MG/DL (ref 8.9–10.3)
CHLORIDE SERPL-SCNC: 101 MEQ/L (ref 98–109)
CLARITY UR REFRACT.AUTO: ABNORMAL
CO2 SERPL-SCNC: 29 MEQ/L (ref 22–32)
COLOR UR AUTO: YELLOW
CREAT SERPL-MCNC: 1 MG/DL (ref 0.6–1.1)
DIFFERENTIAL METHOD BLD: NORMAL
EOSINOPHIL # BLD: 0.18 K/UL (ref 0.04–0.36)
EOSINOPHIL NFR BLD: 2.1 %
ERYTHROCYTE [DISTWIDTH] IN BLOOD BY AUTOMATED COUNT: 13.6 % (ref 11.7–14.4)
FLUAV RNA SPEC QL NAA+PROBE: NEGATIVE
FLUBV RNA SPEC QL NAA+PROBE: NEGATIVE
GFR SERPL CREATININE-BSD FRML MDRD: 58.5 ML/MIN/1.73M*2
GLUCOSE SERPL-MCNC: 95 MG/DL (ref 70–99)
GLUCOSE UR STRIP.AUTO-MCNC: NEGATIVE MG/DL
HCT VFR BLDCO AUTO: 40.2 % (ref 35–45)
HGB BLD-MCNC: 13.2 G/DL (ref 11.8–15.7)
HGB UR QL STRIP.AUTO: 1
HYALINE CASTS #/AREA URNS LPF: ABNORMAL /LPF
IMM GRANULOCYTES # BLD AUTO: 0.02 K/UL (ref 0–0.08)
IMM GRANULOCYTES NFR BLD AUTO: 0.2 %
KETONES UR STRIP.AUTO-MCNC: NEGATIVE MG/DL
LEUKOCYTE ESTERASE UR QL STRIP.AUTO: NEGATIVE
LIPASE SERPL-CCNC: 25 U/L (ref 20–51)
LYMPHOCYTES # BLD: 2.57 K/UL (ref 1.2–3.5)
LYMPHOCYTES NFR BLD: 30.3 %
MCH RBC QN AUTO: 28.3 PG (ref 28–33.2)
MCHC RBC AUTO-ENTMCNC: 32.8 G/DL (ref 32.2–35.5)
MCV RBC AUTO: 86.1 FL (ref 83–98)
MONOCYTES # BLD: 0.35 K/UL (ref 0.28–0.8)
MONOCYTES NFR BLD: 4.1 %
MUCOUS THREADS URNS QL MICRO: 2 /LPF
NEUTROPHILS # BLD: 5.31 K/UL (ref 1.7–7)
NEUTS SEG NFR BLD: 62.6 %
NITRITE UR QL STRIP.AUTO: NEGATIVE
NRBC BLD-RTO: 0 %
PDW BLD AUTO: 10.5 FL (ref 9.4–12.3)
PH UR STRIP.AUTO: 6 [PH]
PLATELET # BLD AUTO: 284 K/UL (ref 150–369)
POTASSIUM SERPL-SCNC: 4.6 MEQ/L (ref 3.6–5.1)
PROT SERPL-MCNC: 6.6 G/DL (ref 6–8.2)
PROT UR QL STRIP.AUTO: 1
RBC # BLD AUTO: 4.67 M/UL (ref 3.93–5.22)
RBC #/AREA URNS HPF: ABNORMAL /HPF
RSV RNA SPEC QL NAA+PROBE: NEGATIVE
SARS-COV-2 RNA RESP QL NAA+PROBE: NEGATIVE
SODIUM SERPL-SCNC: 135 MEQ/L (ref 136–144)
SP GR UR REFRACT.AUTO: >=1.03
SQUAMOUS #/AREA URNS HPF: 2 /HPF
UROBILINOGEN UR STRIP-ACNC: 0.2 EU/DL
WBC # BLD AUTO: 8.49 K/UL (ref 3.8–10.5)
WBC #/AREA URNS HPF: ABNORMAL /HPF

## 2021-11-16 PROCEDURE — 81001 URINALYSIS AUTO W/SCOPE: CPT

## 2021-11-16 PROCEDURE — 96360 HYDRATION IV INFUSION INIT: CPT

## 2021-11-16 PROCEDURE — 83690 ASSAY OF LIPASE: CPT

## 2021-11-16 PROCEDURE — 85025 COMPLETE CBC W/AUTO DIFF WBC: CPT

## 2021-11-16 PROCEDURE — 25800000 HC PHARMACY IV SOLUTIONS: Performed by: PHYSICIAN ASSISTANT

## 2021-11-16 PROCEDURE — 83690 ASSAY OF LIPASE: CPT | Performed by: STUDENT IN AN ORGANIZED HEALTH CARE EDUCATION/TRAINING PROGRAM

## 2021-11-16 PROCEDURE — 81001 URINALYSIS AUTO W/SCOPE: CPT | Performed by: STUDENT IN AN ORGANIZED HEALTH CARE EDUCATION/TRAINING PROGRAM

## 2021-11-16 PROCEDURE — 80053 COMPREHEN METABOLIC PANEL: CPT | Performed by: STUDENT IN AN ORGANIZED HEALTH CARE EDUCATION/TRAINING PROGRAM

## 2021-11-16 PROCEDURE — 85025 COMPLETE CBC W/AUTO DIFF WBC: CPT | Performed by: STUDENT IN AN ORGANIZED HEALTH CARE EDUCATION/TRAINING PROGRAM

## 2021-11-16 PROCEDURE — 3E0337Z INTRODUCTION OF ELECTROLYTIC AND WATER BALANCE SUBSTANCE INTO PERIPHERAL VEIN, PERCUTANEOUS APPROACH: ICD-10-PCS | Performed by: STUDENT IN AN ORGANIZED HEALTH CARE EDUCATION/TRAINING PROGRAM

## 2021-11-16 PROCEDURE — 99284 EMERGENCY DEPT VISIT MOD MDM: CPT | Mod: 25

## 2021-11-16 PROCEDURE — 63700000 HC SELF-ADMINISTRABLE DRUG: Performed by: STUDENT IN AN ORGANIZED HEALTH CARE EDUCATION/TRAINING PROGRAM

## 2021-11-16 PROCEDURE — 80053 COMPREHEN METABOLIC PANEL: CPT

## 2021-11-16 PROCEDURE — 87637 SARSCOV2&INF A&B&RSV AMP PRB: CPT | Performed by: PHYSICIAN ASSISTANT

## 2021-11-16 PROCEDURE — 96361 HYDRATE IV INFUSION ADD-ON: CPT

## 2021-11-16 PROCEDURE — 36415 COLL VENOUS BLD VENIPUNCTURE: CPT

## 2021-11-16 RX ORDER — METRONIDAZOLE 500 MG/1
500 TABLET ORAL ONCE
Status: COMPLETED | OUTPATIENT
Start: 2021-11-16 | End: 2021-11-16

## 2021-11-16 RX ORDER — FLUCONAZOLE 200 MG/1
200 TABLET ORAL ONCE
Qty: 1 TABLET | Refills: 0 | Status: SHIPPED | OUTPATIENT
Start: 2021-11-16 | End: 2021-11-16

## 2021-11-16 RX ORDER — METRONIDAZOLE 500 MG/1
500 TABLET ORAL 3 TIMES DAILY
Qty: 21 TABLET | Refills: 0 | Status: SHIPPED | OUTPATIENT
Start: 2021-11-16 | End: 2021-11-23

## 2021-11-16 RX ORDER — ESCITALOPRAM OXALATE 5 MG/1
5 TABLET ORAL
COMMUNITY
Start: 2021-10-28

## 2021-11-16 RX ADMIN — METRONIDAZOLE 500 MG: 500 TABLET ORAL at 21:25

## 2021-11-16 RX ADMIN — SODIUM CHLORIDE 1000 ML: 9 INJECTION, SOLUTION INTRAVENOUS at 19:50

## 2021-11-16 ASSESSMENT — ENCOUNTER SYMPTOMS
DYSURIA: 0
VOMITING: 0
APPETITE CHANGE: 1
ABDOMINAL PAIN: 1
FEVER: 1
FREQUENCY: 1
NAUSEA: 1
BLOOD IN STOOL: 1
DIARRHEA: 1
SHORTNESS OF BREATH: 0

## 2021-11-17 NOTE — ED PROVIDER NOTES
"Emergency Medicine Note  HPI   HISTORY OF PRESENT ILLNESS     51 year old female with a history of GERD, IBD, ADHD, anxiety presents to the ER with diarrhea. Pt reports that 4 days ago she started to develop diarrhea. She says she had an episode with mucousy \"light pink blood\" but now is only having episode of diarrhea if she tries to eat something. She says she had a bowel of soup yesterday and has been trying to follow a BRAT diet. She also reports lack of appetite and nausea. She says she feels like she has a fever but never measured her temp. She also has some mild cramping in her abdomen. Her symptoms started after eating pasta salad. She says she has a h/o IBD But is not being treated for it.             Patient History   PAST HISTORY     Reviewed from Nursing Triage:      Past Medical History:   Diagnosis Date   • ADHD    • Anxiety    • Bowel disease, inflammatory    • Endometriosis    • Gastroenteritis    • GERD (gastroesophageal reflux disease)        Past Surgical History:   Procedure Laterality Date   • HYSTERECTOMY  2008    IZZY 2/2 endometriosis   • WISDOM TOOTH EXTRACTION         Family History   Problem Relation Age of Onset   • Heart disease Biological Father    • Lumbar disc disease Biological Father    • Dementia Biological Mother        Social History     Tobacco Use   • Smoking status: Current Every Day Smoker     Packs/day: 0.50     Years: 30.00     Pack years: 15.00   • Smokeless tobacco: Never Used   Vaping Use   • Vaping Use: Not on file   Substance Use Topics   • Alcohol use: No   • Drug use: Yes     Types: Marijuana         Review of Systems   REVIEW OF SYSTEMS     Review of Systems   Constitutional: Positive for appetite change and fever (subjective).   Respiratory: Negative for shortness of breath.    Cardiovascular: Negative for chest pain.   Gastrointestinal: Positive for abdominal pain, blood in stool (1x), diarrhea and nausea. Negative for vomiting.   Genitourinary: Positive for " frequency. Negative for dysuria.         VITALS     ED Vitals    Date/Time Temp Pulse Resp BP SpO2 [facility]   11/16/21 1537 36.3 °C (97.4 °F) 98 18 122/85 98 % DLP                       Physical Exam   PHYSICAL EXAM     Physical Exam  Constitutional:       General: She is not in acute distress.     Appearance: She is well-developed. She is not ill-appearing, toxic-appearing or diaphoretic.   HENT:      Head: Normocephalic and atraumatic.   Cardiovascular:      Rate and Rhythm: Normal rate and regular rhythm.   Pulmonary:      Effort: Pulmonary effort is normal.      Breath sounds: Normal breath sounds.   Abdominal:      Palpations: Abdomen is soft.      Tenderness: There is no abdominal tenderness (no tenderness throughout her abdomen to light and deep palpation). There is no guarding or rebound.   Skin:     General: Skin is warm and dry.   Neurological:      Mental Status: She is alert.           PROCEDURES     Procedures     DATA     Results     Procedure Component Value Units Date/Time    UA with reflex culture [[id number]]  (Abnormal) Collected: 11/16/21 1559    Specimen: Urine, Clean Catch Updated: 11/16/21 1752    Narrative:      The following orders were created for panel order UA with reflex culture.  Procedure                               Abnormality         Status                     ---------                               -----------         ------                     UA Reflex to Culture (Ma...[[id number]]  Abnormal            Final result               UA Microscopic[[id number]]               Abnormal            Final result                 Please view results for these tests on the individual orders.    UA Microscopic [[id number]]  (Abnormal) Collected: 11/16/21 1559    Specimen: Urine, Clean Catch Updated: 11/16/21 1752     RBC, Urine 0 TO 4 /HPF      WBC, Urine 0 TO 3 /HPF      Squamous Epithelial +2 /hpf      Hyaline Casts 3 TO 9 /lpf      Bacteria, Urine +1 /HPF      Amorphous Urate Crystals +1 /hpf       MUCUSUA +2 /LPF     Comprehensive metabolic panel [[id number]]  (Abnormal) Collected: 11/16/21 1616    Specimen: Blood, Venous Updated: 11/16/21 1710     Sodium 135 mEQ/L      Potassium 4.6 mEQ/L      Chloride 101 mEQ/L      CO2 29 mEQ/L      BUN 8 mg/dL      Creatinine 1.0 mg/dL      Glucose 95 mg/dL      Calcium 9.3 mg/dL      AST (SGOT) 15 IU/L      ALT (SGPT) 14 IU/L      Alkaline Phosphatase 68 IU/L      Total Protein 6.6 g/dL      Albumin 3.9 g/dL      Bilirubin, Total 0.3 mg/dL      eGFR 58.5 mL/min/1.73m*2      Anion Gap 5 mEQ/L     Lipase [[id number]]  (Normal) Collected: 11/16/21 1616    Specimen: Blood, Venous Updated: 11/16/21 1710     Lipase 25 U/L     UA Reflex to Culture (Macroscopic) [[id number]]  (Abnormal) Collected: 11/16/21 1559    Specimen: Urine, Clean Catch Updated: 11/16/21 1652     Color, Urine Yellow     Clarity, Urine Hazy     Specific Gravity, Urine >=1.030     pH, Urine 6.0     Leukocyte Esterase Negative     Comment: Results can be falsely negative due to high specific gravity, some antibiotics, glucose >3 g/dl, or WBC other than neutrophils.        Nitrite, Urine Negative     Protein, Urine +1     Glucose, Urine Negative mg/dL      Ketones, Urine Negative mg/dL      Urobilinogen, Urine 0.2 EU/dL      Bilirubin, Urine +1 mg/dL      Blood, Urine +1     Comment: The sensitivity of the occult blood test is equivalent to approximately 4 intact RBC/HPF.       CBC and differential [[id number]] Collected: 11/16/21 1616    Specimen: Blood, Venous Updated: 11/16/21 1647     WBC 8.49 K/uL      RBC 4.67 M/uL      Hemoglobin 13.2 g/dL      Hematocrit 40.2 %      MCV 86.1 fL      MCH 28.3 pg      MCHC 32.8 g/dL      RDW 13.6 %      Platelets 284 K/uL      MPV 10.5 fL      Differential Type Auto     nRBC 0.0 %      Immature Granulocytes 0.2 %      Neutrophils 62.6 %      Lymphocytes 30.3 %      Monocytes 4.1 %      Eosinophils 2.1 %      Basophils 0.7 %      Immature Granulocytes, Absolute 0.02 K/uL       Neutrophils, Absolute 5.31 K/uL      Lymphocytes, Absolute 2.57 K/uL      Monocytes, Absolute 0.35 K/uL      Eosinophils, Absolute 0.18 K/uL      Basophils, Absolute 0.06 K/uL           Imaging Results    None         No orders to display       Scoring tools                                 ED Course & MDM   MDM / ED COURSE and CLINICAL IMPRESSIONS     [facility]    ED Course as of 11/16/21 2209 Tue Nov 16, 2021 1943 Discussed with TACOS. Agree with plan.  [NS]   2028 Pt seen and evaluated. Reports onset Friday morning with GI sx associated initially with dry heaves and nausea associated with diarrhea. Persisted through weekend into today. Subjective fever without measured temperature. Labs reviewed without acute findings. Pending COVID. Pt seeking abx treatment for sx. Pending COVID will consider. Do not believe CT scan will aid in treatment plan given abdomen generally benign upon exam but exam possibly concerning for mild diverticulitis. Q/C addressed. Agree with plan.  [NS]   2046 COVID/flu/RSV negative. Pt stable for dc home; tolerated PO. Pt comfortable with flagyl treatment; encouraged probiotic use. Pt also states gets yeast infection with abx use and requesting fluconazole; rx provided. Pt otherwise stable for dc home. Return if worse or for any other concerns.  [NS]      ED Course User Index  [NS] Geoff Krishnan, DO         Clinical Impressions as of 11/16/21 2209   Left lower quadrant abdominal pain   Diarrhea, unspecified type            Michelle Charlton PA C  11/16/21 2209

## 2021-11-17 NOTE — DISCHARGE INSTRUCTIONS
Follow up with your primary care physician in 1-3 days for repeat evaluation to ensure improvement and resolution of symptoms.     While taking antibiotics take an over-the-counter probiotic pill or a probiotic containing yogurt to help prevent against gastrointestinal side effects (for example infectious diarrhea).     Return to the ER if worse (for example fever, concerning pain, chest pain, shortness of breath) or for any other concern.

## 2021-11-17 NOTE — ED ATTESTATION NOTE
"I saw and evaluated the patient, participated in the management, and agree with the findings in the above note. We discussed the case and the treatment plan.  Exam:  Patient Vitals for the past 72 hrs:   BP Temp Pulse Resp SpO2 Height Weight   11/16/21 1537 122/85 36.3 °C (97.4 °F) 98 18 98 % 1.778 m (5' 10\") 65.8 kg (145 lb)   vss, nad, nontoxic, head at/nc, normal speech, no resp distress, normal skin tone, coordinated movement, abd soft without distention, LLQ abdominal discomfort \"dull\" upon palpation, no peritoneal findings, no guarding.        Geoff Krishnan, DO  11/16/21 2111    "

## 2021-12-26 NOTE — PROGRESS NOTES
[facility]  Internal Medicine Progress Note       ASSESSMENT AND PLAN     Maxillary sinusitis  At patient's last office visit she received a prescription for ENT however had trouble accessing provider.  -Another prescription sent for ENT sent    Diarrhea  Patient with a history of chronic diarrhea fluctuating between constipation and diarrhea.  Uncertain if she has a diagnosis of IBD.  Symptoms more in keeping with IBS IBS however, inflammatory enteropathy, diverticulitis or other autoimmune malabsorptive syndromes need to be ruled out    -Referral to GI for further work-up of diarrhea  -Can obtain screening sedimentation rate to evaluate any inflammatory processes present  -Patient still needs to complete CBC and iron panel from last visit  -Patient advised utilizing peppermint capsules in order to aid with abdominal discomfort.  Patient does not have any reflux symptoms presently and was warned of risk of exacerbating this.  -Patient advised to keep a diet diary as well as associational bowel movements in order to further elucidate diet component    GERD without esophagitis  Patient with known hiatal hernia.  Currently not experiencing any symptoms at are affecting her lifestyle.  Patient would likely need to be reevaluated again if she does develop any symptoms and will require PPI for further management.    Anxiety  Continue to follow-up with therapist and psychologist  Continue SSRI and trazodone as per psych  SSRI might need to be modified if evidence of GI bleeding or PPI need to be added    Tooth pain  Patient reports poor oral hygiene.  Has a appointment with a dentist for previous lower right molar caries    Iron deficiency anemia due to chronic blood loss  History of iron deficiency anemia.  Last CBC performed in November did not show any anemia.  Patient has not yet completed CBC and iron panel from last visit in September.    -Complete CBC and iron panel      Health care  "maintenance:   -Patient will need to complete mammogram  -Patient will need additional booster shot for COVID-19  -Patient deferred pneumococcal vaccine   -patient deferred zoster vaccine   -patient defers influenza vaccine    No follow-ups on file.  At next visit:     Assess if follow-up with GI is successful.  She has an appointment arranged for next month.  Offer some advice or nicotine replacement therapy for smoking cessation.  Ensure she is not experiencing tooth pain and she has seen ENT for sinus issues.  Continue to inquire about her moods to ensure not causing any lifestyle disability.  Get an update on Covid vaccination status.    Seamus Parker MD     SUBJECTIVE     Connie VIMAL Arti is a 51 y.o. year-old female, primary patient of Dr. Parker with a past medical history of anxiety, ADHD, GERD, IBD, and ADAM  who presents for follow-up visit.    Interval History: Patient was [place] [facility] [place] for abdominal pain, subjective fevers and previous 4-day history of bloody diarrhea that had resolved on the day of presentation to the ED.  She was discharged home with a short course of metronidazole.  Her lab results at that time showed an unremarkable CBC and BMP.  No additional imaging was performed.  Also noted is doing better admission where some episodes of nausea and dry heaving without any emesis.    Patient has noted that she had been having chronic \" abdominal issues\".  She was last seen by GI around 2018 where she underwent a colonoscopy and polypectomy and an EGD.  Subsequent gastric biopsy and and colorectal biopsy were unrevealing for malignancy.  Patient has a known hiatal hernia with intermittent reflux symptoms not currently interrupting daily life.  Patient described that she had a \"flareup\" a month ago when she presented to the emergency department.  She said since then the antibiotics helped improve her symptoms.  She had been noticing that her bowel movements are every other " day with different series and stool consistency fluctuating with solid formed stools and liquid stools.  She denied any melena or blood in stools.  Patient uncertain if she has a history of hemorrhoids.  She also noted that she has had some associated abdominal pain that is constant and located on the left lower quadrant with no radiation.  She states that at times severity of the pain would be a 10 out of 10.  Patient denied any nausea vomiting or subjective fevers.  Patient does not notice any decrease in appetite or significant weight loss.  Patient has anxiety with food and had been modifying her diet due to concern of abdominal pain.  She is uncertain of what foods might make her abdominal symptoms worse.  However believes that sometimes chocolates might results and some of her symptoms.  She also endorses some constipation and bloating.    Patient was unable to state if she had been diagnosed with IBD or IBS.  Chart review without any formal diagnosis.  Patient had not seen a GI doctor given that last doctors she saw has graduated on.      In regards to her anxiety, patient still follows up with her psychiatrist and therapist and has noted that the escitalopram is somewhat helping the mood however states that she is still anxious with concerns surrounding COVID-19, taking care of her mother who has dementia, tooth ache and GI issues.  She feels well supported by her her current team.      Patient also stated she follows OB/GYN given that she had a IZZY and previous endometriosis.  She is currently on hormone replacement therapy to which her OB/GYN is weaning down.  She states that she still experiencing flushing.      With regards to her sinusitis patient has not been able to see an ENT just yet with some difficulties in scheduling appointment.    Patient states that she is continuing to smoke although trying to wean off.      PHYSICAL EXAMINATION     Visit Vitals  BP (!) 135/91 (BP Location: Left upper arm,  "Patient Position: Sitting)   Pulse 88   Temp 36.4 °C (97.5 °F) (Temporal)   Ht 1.778 m (5' 10\")   Wt 64.9 kg (143 lb)   SpO2 98%   BMI 20.52 kg/m²       Physical Exam  Constitutional:       General: She is not in acute distress.     Appearance: Normal appearance. She is not toxic-appearing.   HENT:      Head: Normocephalic and atraumatic.      Mouth/Throat:      Mouth: Mucous membranes are moist.   Cardiovascular:      Rate and Rhythm: Normal rate and regular rhythm.      Pulses: Normal pulses.      Heart sounds: No murmur heard.    No gallop.   Pulmonary:      Effort: Pulmonary effort is normal. No respiratory distress.      Breath sounds: No wheezing or rales.   Abdominal:      General: Abdomen is flat. Bowel sounds are normal. There is no distension.      Palpations: Abdomen is soft. There is no mass.      Tenderness: There is abdominal tenderness. There is no guarding.      Comments: Mild tenderness on deep palpation of left lower quadrant   Musculoskeletal:      Right lower leg: No edema.      Left lower leg: No edema.   Skin:     General: Skin is warm.   Neurological:      Mental Status: She is alert and oriented to person, place, and time.   Psychiatric:      Comments: Anxious and worried             LABS / IMAGING / STUDIES        Labs Reviewed:   Last BMP:  Lab Results   Component Value Date     (L) 11/16/2021    K 4.6 11/16/2021     11/16/2021    CO2 29 11/16/2021    BUN 8 11/16/2021    CREATININE 1.0 11/16/2021       Last CBC:  Lab Results   Component Value Date    WBC 8.49 11/16/2021    HGB 13.2 11/16/2021    HCT 40.2 11/16/2021    MCV 86.1 11/16/2021     11/16/2021       Last Lipids:  No results found for: CHOL, HDL, LDLCALC, TRIG    Last three HgbA1c:  No results found for: HGBA1C    Last Microalbumin:  No results found for: MICROALBUR    Last TSH:  Lab Results   Component Value Date    TSH 0.771 05/31/2018       Last Vitamin D:  No results found for: LYPF10TJ      Wt Readings from " Last 15 Encounters:   12/27/21 64.9 kg (143 lb)   11/16/21 65.8 kg (145 lb)   09/13/21 64.2 kg (141 lb 9.6 oz)   05/22/19 65.8 kg (145 lb)   05/03/19 67.6 kg (149 lb)   03/22/19 67.1 kg (148 lb)   06/20/18 64.4 kg (142 lb)   05/24/18 64.4 kg (142 lb)   05/18/18 65.8 kg (145 lb)   04/13/18 64.9 kg (143 lb)   =    Studies/Imaging Reviewed:     XRAY SCAN DOCUMENT  Ordered by an unspecified provider.        MEDICATIONS      Current Outpatient Medications   Medication Instructions   • escitalopram (LEXAPRO) 5 mg, oral, Daily (6a)   • estradioL (ESTRACE) 0.5 mg, oral, Daily   • traZODone (DESYREL) 50 mg, oral, Nightly PRN

## 2021-12-27 ENCOUNTER — OFFICE VISIT (OUTPATIENT)
Dept: INTERNAL MEDICINE | Facility: HOSPITAL | Age: 51
End: 2021-12-27
Payer: COMMERCIAL

## 2021-12-27 VITALS
OXYGEN SATURATION: 98 % | BODY MASS INDEX: 20.47 KG/M2 | WEIGHT: 143 LBS | HEART RATE: 88 BPM | DIASTOLIC BLOOD PRESSURE: 91 MMHG | HEIGHT: 70 IN | TEMPERATURE: 97.5 F | SYSTOLIC BLOOD PRESSURE: 135 MMHG

## 2021-12-27 DIAGNOSIS — R19.7 DIARRHEA, UNSPECIFIED TYPE: ICD-10-CM

## 2021-12-27 DIAGNOSIS — F41.9 ANXIETY: ICD-10-CM

## 2021-12-27 DIAGNOSIS — K52.9 INFLAMMATORY BOWEL DISEASE: Primary | ICD-10-CM

## 2021-12-27 DIAGNOSIS — J32.0 MAXILLARY SINUSITIS, UNSPECIFIED CHRONICITY: ICD-10-CM

## 2021-12-27 PROBLEM — K08.89 TOOTH PAIN: Status: ACTIVE | Noted: 2021-12-27

## 2021-12-27 PROCEDURE — 99214 OFFICE O/P EST MOD 30 MIN: CPT | Mod: GC

## 2021-12-27 PROCEDURE — G0463 HOSPITAL OUTPT CLINIC VISIT: HCPCS

## 2021-12-27 PROCEDURE — 3008F BODY MASS INDEX DOCD: CPT

## 2021-12-27 ASSESSMENT — PATIENT HEALTH QUESTIONNAIRE - PHQ9: SUM OF ALL RESPONSES TO PHQ9 QUESTIONS 1 & 2: 0

## 2021-12-27 ASSESSMENT — PAIN SCALES - GENERAL: PAINLEVEL: 1

## 2021-12-27 NOTE — PROGRESS NOTES
I performed a history and physical examination of the patient and discussed the management with the Resident. I reviewed the Resident's note and agree with the documented findings and plan of care, except for my comments below or within the additional notes today.    Patient with a couple of ongoing abdominal discomforts: She feels like her iliopsoas on the left is tight again.  Also she has been having other general abdominal discomforts.  She cannot relate whether this worsens with certain foods.  We were able to get her an appointment in later January to follow-up in GI.  In the meantime, she is to keep a strict diary of every bite of food she takes to see whether there is any connection between her diet and her discomfort-she was advised that it is possible that her symptoms may worsen a day or more after eating something so she might not see the connection right away.  We also advised that she could try peppermint oil capsules before 1 meal a day, and work up to 3 meals a day with the understanding that it is possible it could exacerbate any heartburn symptoms she may have.      KATHY Zuñiga MD

## 2021-12-27 NOTE — ASSESSMENT & PLAN NOTE
At patient's last office visit she received a prescription for ENT however had trouble accessing provider.  -Another prescription sent for ENT sent

## 2021-12-27 NOTE — ASSESSMENT & PLAN NOTE
Patient reports poor oral hygiene.  Has a appointment with a dentist for previous lower right molar caries

## 2021-12-27 NOTE — PATIENT INSTRUCTIONS
Please keep a Diary of your diet and association with your bowel movements.    You have an appointment with GI 01/27/2022 at 3pm.    Please make sure you also see ENT for Sinus problems.    Please also complete your mammogram and complete your visit with the dentist.    Please also ensure you get an additional shot of COVID vaccine.    Please complete the ESR.

## 2021-12-27 NOTE — ASSESSMENT & PLAN NOTE
History of iron deficiency anemia.  Last CBC performed in November did not show any anemia.  Patient has not yet completed CBC and iron panel from last visit in September.    -Complete CBC and iron panel

## 2021-12-27 NOTE — ASSESSMENT & PLAN NOTE
Patient with known hiatal hernia.  Currently not experiencing any symptoms at are affecting her lifestyle.  Patient would likely need to be reevaluated again if she does develop any symptoms and will require PPI for further management.

## 2021-12-27 NOTE — ASSESSMENT & PLAN NOTE
Continue to follow-up with therapist and psychologist  Continue SSRI and trazodone as per psych  SSRI might need to be modified if evidence of GI bleeding or PPI need to be added

## 2021-12-27 NOTE — ASSESSMENT & PLAN NOTE
Patient with a history of chronic diarrhea fluctuating between constipation and diarrhea.  Uncertain if she has a diagnosis of IBD.  Symptoms more in keeping with IBS IBS however, inflammatory enteropathy, diverticulitis or other autoimmune malabsorptive syndromes need to be ruled out    -Referral to GI for further work-up of diarrhea  -Can obtain screening sedimentation rate to evaluate any inflammatory processes present  -Patient still needs to complete CBC and iron panel from last visit  -Patient advised utilizing peppermint capsules in order to aid with abdominal discomfort.  Patient does not have any reflux symptoms presently and was warned of risk of exacerbating this.  -Patient advised to keep a diet diary as well as associational bowel movements in order to further elucidate diet component

## 2022-01-06 ENCOUNTER — DOCUMENTATION (OUTPATIENT)
Dept: INTERNAL MEDICINE | Facility: HOSPITAL | Age: 52
End: 2022-01-06
Payer: COMMERCIAL

## 2022-01-06 NOTE — PROGRESS NOTES
Pt seen by [facility], Dr. Peguero, on 1/05/2022. A nasal endoscopy was performed. Was started on Augmentin 875 BID for 10 days for chronic pansinusitis.

## 2022-01-27 ENCOUNTER — OFFICE VISIT (OUTPATIENT)
Dept: GASTROENTEROLOGY | Facility: HOSPITAL | Age: 52
End: 2022-01-27
Attending: STUDENT IN AN ORGANIZED HEALTH CARE EDUCATION/TRAINING PROGRAM
Payer: COMMERCIAL

## 2022-01-27 VITALS
HEART RATE: 82 BPM | TEMPERATURE: 97.3 F | HEIGHT: 70 IN | SYSTOLIC BLOOD PRESSURE: 119 MMHG | RESPIRATION RATE: 16 BRPM | DIASTOLIC BLOOD PRESSURE: 58 MMHG | BODY MASS INDEX: 20.6 KG/M2 | OXYGEN SATURATION: 98 % | WEIGHT: 143.9 LBS

## 2022-01-27 DIAGNOSIS — R10.32 LLQ PAIN: Primary | ICD-10-CM

## 2022-01-27 ASSESSMENT — ENCOUNTER SYMPTOMS
FATIGUE: 0
CONSTIPATION: 1
ACTIVITY CHANGE: 0
DIARRHEA: 1
NAUSEA: 0
APPETITE CHANGE: 0
ABDOMINAL PAIN: 1
FEVER: 0
UNEXPECTED WEIGHT CHANGE: 0
CHILLS: 0
VOMITING: 0

## 2022-01-27 ASSESSMENT — PAIN SCALES - GENERAL: PAINLEVEL: 2

## 2022-01-27 NOTE — PATIENT INSTRUCTIONS
I suspect your good gut bacteria got thrown off with recent antibiotic use and you are recovering. But it sounds like you have underlying IBS with stool trapped in your left colon which then results in let down diarrhea. We should start a standing bowel regimen every day with goal 1-2 soft large stools daily to prevent back up in your colon and distention.    You can  magnesium oxide at any drug store. You can start with 400mg daily and increase to 800mg or even 1200mg pending how you respond. I would start with 400mg and do this daily for a week and if you are not having adequate bowel movements increase to 800mg. Please call me with any concerns and I will see you back as needed.    You will not be due for a colonoscopy until 2028.

## 2022-01-27 NOTE — PROGRESS NOTES
"     Gastroenterology Clinic Note       SUBJECTIVE   Connie Chi is a 51 y.o. year-old female with a past medical history hysterectomy of diarrhea and upper abdominal pain and underwent EGD/colonoscopy which shoed mild gastritis and a healed antral ulcer negative for hpylori. Colonoscopy was to the TI with good prep which revealed a 4mm HP and was otherwise normal with negative pan-biopsies.    She said since her procedures she has not followed up bc her symptoms resolved until the past 3 months.  At baseline has 1 soft stool once a day light brown but the past 3 months has had recurrence of her \"attacks\" after eating chocolate and taking an antibiotic for a sinus infection. She describes dull LLQ pain but it can intensify and can be more intense. Now she is having irregular bowel habits with loose stool and sometimes no BMs in several days with improvement in pain. She describes the days she has loose stool as massive volumes of soft/liquid stool after several days of not having a BM. Took antibiotics last 5 days ago, things have improved since discontinuing. No fevers/chills.     She has tried probiotics, pepcid.   She denies any NSAID use.   She has no issues with eating, dysphagia and is unclear about frequency of reflux but states she just \"pops\" a pepcid if it happens.   REVIEW OF SYSTEMS   Review of Systems   Constitutional: Negative for activity change, appetite change, chills, fatigue, fever and unexpected weight change.   Gastrointestinal: Positive for abdominal pain, constipation and diarrhea. Negative for nausea and vomiting.        MEDICATIONS        Current Outpatient Medications:   •  escitalopram 5 mg tablet, Take 5 mg by mouth once daily., Disp: , Rfl:   •  estradiol (ESTRACE) 1 mg tablet, Take 0.5 mg by mouth daily. , Disp: , Rfl:   •  traZODone (DESYREL) 50 mg tablet, Take 50 mg by mouth nightly as needed.  , Disp: , Rfl:     PHYSICAL EXAMINATION   Vital signs:   Visit Vitals  BP (!) " "119/58 (BP Location: Right upper arm, Patient Position: Sitting)   Pulse 82   Temp 36.3 °C (97.3 °F) (Temporal)   Resp 16   Ht 1.778 m (5' 10\")   Wt 65.3 kg (143 lb 14.4 oz)   SpO2 98%   BMI 20.65 kg/m²     Physical Exam  Constitutional:       Appearance: She is not ill-appearing or diaphoretic.   Pulmonary:      Effort: No respiratory distress.   Abdominal:      General: There is no distension.      Palpations: Abdomen is soft.      Tenderness: There is no abdominal tenderness.   Neurological:      General: No focal deficit present.      Mental Status: She is oriented to person, place, and time.          LABS / IMAGING/STUDIES      Labs Reviewed:   Lab Results   Component Value Date    GLUCOSE 95 11/16/2021    CALCIUM 9.3 11/16/2021     (L) 11/16/2021    K 4.6 11/16/2021    CO2 29 11/16/2021     11/16/2021    BUN 8 11/16/2021    CREATININE 1.0 11/16/2021     Lab Results   Component Value Date    WBC 8.49 11/16/2021    HGB 13.2 11/16/2021    HCT 40.2 11/16/2021    MCV 86.1 11/16/2021     11/16/2021     No results found for: HEPCAB    Studies/Imaging Reviewed: as per HPI         ASSESSMENT AND PLAN   Problem List Items Addressed This Visit        Nervous    LLQ pain - Primary    Current Assessment & Plan     The patient describes normal bowel habits after miralax prep for colonoscopy in 2018 that was unremarkable including biopsies (small HP polyp). Then after course of abx 3 moths ago for sinusitis had recurrent symptoms of LLQ pain with constipation and then massive auto-purging. Not improved with probiotics but now improving with dc of antbiotics. She has no fevers/chills and a non-tender exam. Her symptoms sound c/w constipation causing luminal distention and LLQ pain with then auto-purging.     - we talked at length about a standing bowel regimen to keep her stool soft with complete emptying to prevent constipation. We have agreed on magnesium oxide 400mg with plan to uptitrate weekly as high " as 1200mg as needed for 1-2 soft large volume stools daily. This should help relieve her constipation and auto-purging as well as the colonic distention from stool burden  - she has trialed a probiotic w/o success - as I suspect part of this could be from alternations in her microbiota 2/2 antibiotics  - considered fiber supplementation but she already states she feels bloated at times and this could worsen that symptom  - encouraged adequate hydration and fiber in her diet with physical activity  - she will all me/follow-up as needed  - repeat colonoscopy for screening in 2028                Michelle Laureano MD  01/27/22  5:34 PM

## 2022-01-27 NOTE — ASSESSMENT & PLAN NOTE
The patient describes normal bowel habits after miralax prep for colonoscopy in 2018 that was unremarkable including biopsies (small HP polyp). Then after course of abx 3 moths ago for sinusitis had recurrent symptoms of LLQ pain with constipation and then massive auto-purging. Not improved with probiotics but now improving with dc of antbiotics. She has no fevers/chills and a non-tender exam. Her symptoms sound c/w constipation causing luminal distention and LLQ pain with then auto-purging.     - we talked at length about a standing bowel regimen to keep her stool soft with complete emptying to prevent constipation. We have agreed on magnesium oxide 400mg with plan to uptitrate weekly as high as 1200mg as needed for 1-2 soft large volume stools daily. This should help relieve her constipation and auto-purging as well as the colonic distention from stool burden  - she has trialed a probiotic w/o success - as I suspect part of this could be from alternations in her microbiota 2/2 antibiotics  - considered fiber supplementation but she already states she feels bloated at times and this could worsen that symptom  - encouraged adequate hydration and fiber in her diet with physical activity  - she will all me/follow-up as needed  - repeat colonoscopy for screening in 2028

## 2022-02-02 ENCOUNTER — DOCUMENTATION (OUTPATIENT)
Dept: INTERNAL MEDICINE | Facility: HOSPITAL | Age: 52
End: 2022-02-02
Payer: COMMERCIAL

## 2022-02-02 NOTE — PROGRESS NOTES
Paperwork from [facility] received detailing patients visit on 1/26/22 for chronic pansinusitis. She was asked to get a repeat sinus CT in 3 weeks if symptoms unresolved, with further follow-up in 4 weeks.    Document has been scanned into patients chart.      Seamus Parker MD  [id number]

## 2022-03-01 ENCOUNTER — OFFICE VISIT (OUTPATIENT)
Dept: INTERNAL MEDICINE | Facility: HOSPITAL | Age: 52
End: 2022-03-01
Attending: STUDENT IN AN ORGANIZED HEALTH CARE EDUCATION/TRAINING PROGRAM
Payer: COMMERCIAL

## 2022-03-01 VITALS
WEIGHT: 143.7 LBS | SYSTOLIC BLOOD PRESSURE: 137 MMHG | HEIGHT: 70 IN | HEART RATE: 75 BPM | BODY MASS INDEX: 20.57 KG/M2 | DIASTOLIC BLOOD PRESSURE: 63 MMHG | TEMPERATURE: 97 F | OXYGEN SATURATION: 100 %

## 2022-03-01 DIAGNOSIS — K21.9 GERD WITHOUT ESOPHAGITIS: ICD-10-CM

## 2022-03-01 DIAGNOSIS — F41.9 ANXIETY: ICD-10-CM

## 2022-03-01 DIAGNOSIS — R10.32 LLQ PAIN: ICD-10-CM

## 2022-03-01 DIAGNOSIS — K21.9 GASTROESOPHAGEAL REFLUX DISEASE WITHOUT ESOPHAGITIS: Primary | ICD-10-CM

## 2022-03-01 DIAGNOSIS — Z72.0 TOBACCO USER: ICD-10-CM

## 2022-03-01 PROCEDURE — 3008F BODY MASS INDEX DOCD: CPT | Performed by: STUDENT IN AN ORGANIZED HEALTH CARE EDUCATION/TRAINING PROGRAM

## 2022-03-01 PROCEDURE — 99214 OFFICE O/P EST MOD 30 MIN: CPT | Mod: GC | Performed by: STUDENT IN AN ORGANIZED HEALTH CARE EDUCATION/TRAINING PROGRAM

## 2022-03-01 RX ORDER — PANTOPRAZOLE SODIUM 40 MG/1
40 TABLET, DELAYED RELEASE ORAL DAILY
Qty: 90 TABLET | Refills: 1 | Status: SHIPPED | OUTPATIENT
Start: 2022-03-01 | End: 2024-01-31 | Stop reason: ALTCHOICE

## 2022-03-01 ASSESSMENT — PAIN SCALES - GENERAL: PAINLEVEL: 0-NO PAIN

## 2022-03-01 ASSESSMENT — PATIENT HEALTH QUESTIONNAIRE - PHQ9: SUM OF ALL RESPONSES TO PHQ9 QUESTIONS 1 & 2: 0

## 2022-03-01 NOTE — PATIENT INSTRUCTIONS
Dear Ms. Chi,     You were seen during this visit for a follow up appointment. The following issues were addressed during your visit:     Stomach pain: You are being prescribed PROTONIX. This medication will be taken ONCE DAILY. If your stomach pain does NOT improve or gets WORSE, please call our office of a visit and we can discuss other options. Please get all of your LABS DONE which you have the paperwork for already.     Please check your paperwork for your CT scan and let us know if you need help to fill this out.     Please be sure to take your medications exactly as prescribed.     We will see you again in the office in 4 weeks.     It was a pleasure to take care of you today at [facility]!

## 2022-03-01 NOTE — PROGRESS NOTES
[facility]  Internal Medicine Progress Note       ASSESSMENT AND PLAN     Tobacco user  Hx smoking for 39 years     - advised to continue with therapy to pick a date to quit   - does have nicotine gum which she is not ready to use yet     Anxiety  Does see a therapist weekly as well a psychiatrist     - c/w therapy   - c/w trazodone and escitalopram as per psychiatry       History of hysterectomy  - c/w estradiol low dose w/Mondays off.   - follows w/ Dr. Bennett at [facility]     GERD without esophagitis  Pt w/known hiatal hernia. worsening symptoms of reflux     - started on pantoprazole 40 daily   - monitor     Maxillary sinusitis  Seen by ENT on 1/2622 for chronic pansinusitis.     - Repeat sinus CT in 3 weeks if symptoms unresolved, with further follow-up in 4 weeks.       Health care maintenance: Mammography: has referral, but has not completed.   Pneumococcal: deferred at this time   Zoster: deferred at this time   Influenza: deferred at this time       Return in about 4 weeks (around 3/29/2022).  At next visit: inquire about abdominal pain after starting PPI, inquire about tobacco use, inquire about mood     Janes Hunt MD     SUBJECTIVE     Connieakil Chi is a 51 y.o. year-old female, primary patient of Dr. Parker with a past medical history of anxiety, ADHD, GERD, IBD, and ADAM  who presents for follow-up visit.    Interval History:     #Sinus pain   Pt last saw ENT on 1/2622 for chronic pansinusitis. She was asked to get a repeat sinus CT in 3 weeks if symptoms unresolved, with further follow-up in 4 weeks. However, she states that her symptoms have improved. Continues to have postnasal drip along with right sided facial pain (improved). Does not do steaming, hot compress or take medications. Has not obtained a CT scan until this point due to insurance complications.     # LLQ and RUQ pain   Pt was seen by GI on 1/27 for eval of LLQ pain. Last colo 2018 w/small sessile polyp 4mm  "which was removed for pathology w/no significant finingd. Due for colo in 2028. Does endorse continued mild discomfort mostly of LUQ which she states as a \"popping\" sensation. At GI exam, discussed a standing bowel regimen with stool softener. Today she does endorse mild constipation (two days w/o BM) however otherwise has been having BM 1 time daily. She states that at the end of her BM, she has \"diarrhea like episodes\". She states that she does have mucous in the stool. Abdominal pain as described above. Denies  N/V, blood in stool, sx of refulx.   Previously trailed probiotic w/o success. Drinks 2-3 glasses of water daily + 20 oz pepsi daily. Has not been using peppermint capsules to help with discomfort. Has difficulty w/consumin raw veggies. Only eats 2 meals a day.     #GERD  Known hiatal hernia. Has not had to take any antacid medications (prilosec) or TUMs for many year. Denies any n/v, belching. Does have mild pain in epigastrium which she endorses to not eating and drinking pepsi on an empt stomach.     #Hx of hysterectomy   In 2008. Was started on premaren - switched to estradiol. Follows OBGYN Dr. Bennett at [place]. Is currently taking low dose w/Mondays off.     #Tobacco use   Continues to smoke 1/2 pack daily daily. Was approved for nicorette gum which she has not used yet. Originally was using patches - not covered by insurance.     #Anxiety  Pt maintained on escitalopram and trazadone. Last saw therapist last week and psychiatrist will be seen 3/22 (Dr. Sinclair).     HCM:   Mammography: has referral, but has not completed.   Pneumococcal: deferred at this time   Zoster: deferred at this time   Influenza: deferred at this time       PHYSICAL EXAMINATION     Visit Vitals  /63 (BP Location: Right upper arm, Patient Position: Sitting)   Pulse 75   Temp 36.1 °C (97 °F) (Temporal)   Ht 1.778 m (5' 10\")   Wt 65.2 kg (143 lb 11.2 oz)   SpO2 100%   BMI 20.62 kg/m²       Physical Exam  Constitutional:       " General: She is not in acute distress.  HENT:      Head: Normocephalic and atraumatic.   Eyes:      Extraocular Movements: Extraocular movements intact.   Cardiovascular:      Rate and Rhythm: Normal rate and regular rhythm.   Pulmonary:      Breath sounds: Normal breath sounds.   Abdominal:      General: There is no distension.      Palpations: Abdomen is soft. There is no mass.      Tenderness: Tenderness: tenderness to deep palpation on epigastrium and mild on LUQ  There is no guarding.   Musculoskeletal:      Right lower leg: No edema.      Left lower leg: No edema.   Skin:     General: Skin is warm and dry.   Neurological:      Mental Status: She is alert and oriented to person, place, and time.   Psychiatric:      Comments: Anxious w/tearful affect              LABS / IMAGING / STUDIES        Labs Reviewed: labs from 11/22 manpreet. Lipase WNL, CMP WNL, CBC WNL,     Studies/Imaging Reviewed: NNI       MEDICATIONS      Current Outpatient Medications   Medication Instructions   • escitalopram (LEXAPRO) 5 mg, oral, Daily (6a)   • estradioL (ESTRACE) 0.5 mg, oral, Daily   • pantoprazole (PROTONIX) 40 mg, oral, Daily   • traZODone (DESYREL) 50 mg, oral, Nightly PRN

## 2022-03-04 PROBLEM — Z90.710 HISTORY OF HYSTERECTOMY: Status: ACTIVE | Noted: 2022-03-04

## 2022-03-04 NOTE — ASSESSMENT & PLAN NOTE
Seen by ENT on 1/2622 for chronic pansinusitis.     - Repeat sinus CT in 3 weeks if symptoms unresolved, with further follow-up in 4 weeks.

## 2022-03-04 NOTE — ASSESSMENT & PLAN NOTE
Hx smoking for 39 years     - advised to continue with therapy to pick a date to quit   - does have nicotine gum which she is not ready to use yet

## 2022-03-04 NOTE — ASSESSMENT & PLAN NOTE
Does see a therapist weekly as well a psychiatrist     - c/w therapy   - c/w trazodone and escitalopram as per psychiatry

## 2022-03-04 NOTE — ASSESSMENT & PLAN NOTE
Pt w/known hiatal hernia. worsening symptoms of reflux     - started on pantoprazole 40 daily   - monitor

## 2022-03-15 NOTE — PROGRESS NOTES
I performed a history and physical examination of the patient and discussed the management with the Resident. I reviewed the Resident's note and agree with the documented findings and plan of care, except for my comments below or within the additional notes today.History of hiatal hernia, now  with some abdominal discomfort, not using any acid lowering medication.Will start trial of PPI and see back in about a month.

## 2022-03-24 ENCOUNTER — DOCUMENTATION (OUTPATIENT)
Dept: INTERNAL MEDICINE | Facility: HOSPITAL | Age: 52
End: 2022-03-24
Payer: COMMERCIAL

## 2022-03-24 NOTE — PROGRESS NOTES
Krissy Irving MA has completed a chart review for Connie Mckeonbandarabraham and have determined that the care gap has been satisfied.    Care Gap Source: [facility]    Care Gap(s) Identified: Breast Cancer Screening    Chart Review Completed: Yes     Pt has an upcoming appt with PCP, no outreach needed at this time.

## 2022-04-13 LAB
25(OH)D3+25(OH)D2 SERPL-MCNC: 23.2 NG/ML (ref 30–100)
CHOLEST SERPL-MCNC: 242 MG/DL (ref 100–199)
ERYTHROCYTE [DISTWIDTH] IN BLOOD BY AUTOMATED COUNT: 13.3 % (ref 11.7–15.4)
ERYTHROCYTE [SEDIMENTATION RATE] IN BLOOD BY WESTERGREN METHOD: 27 MM/HR (ref 0–40)
HCT VFR BLD AUTO: 42.4 % (ref 34–46.6)
HCV AB S/CO SERPL IA: <0.1 S/CO RATIO (ref 0–0.9)
HDLC SERPL-MCNC: 35 MG/DL
HGB BLD-MCNC: 14.4 G/DL (ref 11.1–15.9)
HIV 1+2 AB+HIV1 P24 AG SERPL QL IA: NON REACTIVE
IRON SATN MFR SERPL: 28 % (ref 15–55)
IRON SERPL-MCNC: 74 UG/DL (ref 27–159)
LDLC SERPL CALC-MCNC: 175 MG/DL (ref 0–99)
MCH RBC QN AUTO: 28.9 PG (ref 26.6–33)
MCHC RBC AUTO-ENTMCNC: 34 G/DL (ref 31.5–35.7)
MCV RBC AUTO: 85 FL (ref 79–97)
PLATELET # BLD AUTO: 286 X10E3/UL (ref 150–450)
RBC # BLD AUTO: 4.98 X10E6/UL (ref 3.77–5.28)
T4 FREE SERPL-MCNC: 1.18 NG/DL (ref 0.82–1.77)
TIBC SERPL-MCNC: 262 UG/DL (ref 250–450)
TRIGL SERPL-MCNC: 170 MG/DL (ref 0–149)
TSH SERPL DL<=0.005 MIU/L-ACNC: 1.28 UIU/ML (ref 0.45–4.5)
UIBC SERPL-MCNC: 188 UG/DL (ref 131–425)
VLDLC SERPL CALC-MCNC: 32 MG/DL (ref 5–40)
WBC # BLD AUTO: 8.1 X10E3/UL (ref 3.4–10.8)

## 2022-04-14 ENCOUNTER — OFFICE VISIT (OUTPATIENT)
Dept: INTERNAL MEDICINE | Facility: HOSPITAL | Age: 52
End: 2022-04-14
Payer: COMMERCIAL

## 2022-04-14 VITALS
DIASTOLIC BLOOD PRESSURE: 58 MMHG | BODY MASS INDEX: 20.6 KG/M2 | WEIGHT: 143.9 LBS | RESPIRATION RATE: 18 BRPM | HEART RATE: 84 BPM | OXYGEN SATURATION: 99 % | SYSTOLIC BLOOD PRESSURE: 128 MMHG | HEIGHT: 70 IN | TEMPERATURE: 96.8 F

## 2022-04-14 DIAGNOSIS — E55.9 VITAMIN D DEFICIENCY: ICD-10-CM

## 2022-04-14 DIAGNOSIS — E78.5 DYSLIPIDEMIA: Primary | ICD-10-CM

## 2022-04-14 DIAGNOSIS — Z12.31 ENCOUNTER FOR SCREENING MAMMOGRAM FOR BREAST CANCER: ICD-10-CM

## 2022-04-14 DIAGNOSIS — J44.9 MILD CHRONIC OBSTRUCTIVE PULMONARY DISEASE (CMS/HCC): ICD-10-CM

## 2022-04-14 DIAGNOSIS — Z00.00 HEALTHCARE MAINTENANCE: ICD-10-CM

## 2022-04-14 PROCEDURE — 99213 OFFICE O/P EST LOW 20 MIN: CPT | Mod: GE,GC

## 2022-04-14 PROCEDURE — 3008F BODY MASS INDEX DOCD: CPT

## 2022-04-14 RX ORDER — CHOLECALCIFEROL (VITAMIN D3) 50 MCG
2000 TABLET ORAL DAILY
Qty: 365 TABLET | Refills: 1 | Status: SHIPPED | OUTPATIENT
Start: 2022-04-14 | End: 2023-04-14

## 2022-04-14 RX ORDER — ATORVASTATIN CALCIUM 40 MG/1
40 TABLET, FILM COATED ORAL DAILY
Qty: 90 TABLET | Refills: 1 | Status: SHIPPED | OUTPATIENT
Start: 2022-04-14 | End: 2022-04-14

## 2022-04-14 ASSESSMENT — PAIN SCALES - GENERAL: PAINLEVEL: 0-NO PAIN

## 2022-04-14 ASSESSMENT — PATIENT HEALTH QUESTIONNAIRE - PHQ9: SUM OF ALL RESPONSES TO PHQ9 QUESTIONS 1 & 2: 0

## 2022-04-14 NOTE — ASSESSMENT & PLAN NOTE
Seen by ENT on 1/2622 for chronic pansinusitis. It has improved since last visit and plan for sinus CT in next few weeks and follow up with ENT pending paperwork approval    - ENT following  - f/u CT sinuses

## 2022-04-14 NOTE — ASSESSMENT & PLAN NOTE
4/12: Cholesterol elevated 242, LDL elevated 175, low HDL 35  ASCVD risk 8.1%  - no indication for statin at this time, patient advised on dietary and lifestyle modifications

## 2022-04-14 NOTE — PROGRESS NOTES
"     [facility]  Internal Medicine Progress Note       ASSESSMENT AND PLAN     History of hysterectomy  - c/w estradiol low dose w/Mondays off.   - follows w/ Dr. Bennett at [place]     GERD without esophagitis  Pt w/known hiatal hernia. Improving symptoms of reflux with pantoprazole 40mg daily initiated on previous visits.     - started on pantoprazole 40 daily on previous visit with improvement and following with GI    Tobacco user  Hx smoking for 39 years     - advised to continue with therapy to pick a date to quit   - does have nicotine gum which she is not ready to use yet     Maxillary sinusitis  Seen by ENT on 1/2622 for chronic pansinusitis. It has improved since last visit and plan for sinus CT in next few weeks and follow up with ENT pending paperwork approval    - ENT following  - f/u CT sinuses    Dyslipidemia  4/12: Cholesterol elevated 242, LDL elevated 175, low HDL 35  ASCVD risk 8.1%  - no indication for statin at this time, patient advised on dietary and lifestyle modifications     Vitamin D deficiency  25-OH vitamin D low @ 23.2  - started on vitamin D replacement therapy        Health care maintenance: mammo ordered, shingrix given in office. PCV 20 when available    No follow-ups on file.  At next visit: shingrix, PCV 20    Helena Scott MD     SUBJECTIVE     Connie CELIS Arti is a 51 y.o. year-old female, primary patient of Dr. Parker with a past medical history of anxiety, ADHD, GERD, and ADAM  who presents for follow up.    Interval History:     PHYSICAL EXAMINATION     Visit Vitals  BP (!) 128/58 (BP Location: Left upper arm, Patient Position: Sitting)   Pulse 84   Temp (!) 36 °C (96.8 °F) (Temporal)   Resp 18   Ht 1.778 m (5' 10\")   Wt 65.3 kg (143 lb 14.4 oz)   SpO2 99%   BMI 20.65 kg/m²       Physical Exam  Constitutional:       General: She is not in acute distress.     Appearance: Normal appearance. She is not ill-appearing or toxic-appearing.   HENT:     "  Head: Normocephalic and atraumatic.   Cardiovascular:      Rate and Rhythm: Normal rate and regular rhythm.      Pulses: Normal pulses.      Heart sounds: Normal heart sounds. No murmur heard.    No friction rub. No gallop.   Pulmonary:      Effort: Pulmonary effort is normal. No respiratory distress.      Breath sounds: Normal breath sounds. No stridor. No wheezing, rhonchi or rales.   Abdominal:      General: Abdomen is flat. Bowel sounds are normal. There is no distension.      Palpations: Abdomen is soft. There is no mass.      Tenderness: There is no abdominal tenderness. There is no guarding.      Hernia: No hernia is present.   Neurological:      General: No focal deficit present.      Mental Status: She is alert and oriented to person, place, and time.   Psychiatric:         Mood and Affect: Mood normal.         Behavior: Behavior normal.             LABS / IMAGING / STUDIES        Labs Reviewed: reviewed    Studies/Imaging Reviewed: reviewed      MEDICATIONS      Current Outpatient Medications   Medication Instructions   • cholecalciferol (vitamin D3) (VITAMIN D3) 2,000 Units, oral, Daily   • escitalopram (LEXAPRO) 5 mg, oral, Daily (6a)   • estradioL (ESTRACE) 0.5 mg, oral, Daily   • pantoprazole (PROTONIX) 40 mg, oral, Daily   • traZODone (DESYREL) 50 mg, oral, Nightly PRN

## 2022-04-14 NOTE — ASSESSMENT & PLAN NOTE
Pt w/known hiatal hernia. Improving symptoms of reflux with pantoprazole 40mg daily initiated on previous visits.     - started on pantoprazole 40 daily on previous visit with improvement and following with GI

## 2022-04-14 NOTE — PROGRESS NOTES
[facility]  Internal Medicine Progress Note       BRIEF ATTENDING DOCUMENTATION   Please see attestation section of note for attestation and any additional physician documentation not found here.    Type of faculty precepting: Seen    HPI:      PE:    A/P:          Martin Norman MD

## 2022-04-15 NOTE — PROGRESS NOTES
I performed a history and physical examination of the patient and discussed the management with the Resident. I reviewed the Resident's note and agree with the documented findings and plan of care, except for my comments below or within the additional notes today.    Symptoms addressed at last GI visit have improved greatly. LLQ pain improved, intermittent, she believes this is more MSK. Mainly focused on HCM and discussion about specialist visits.     Martin Norman MD

## 2022-07-26 ENCOUNTER — APPOINTMENT (RX ONLY)
Dept: URBAN - METROPOLITAN AREA CLINIC 28 | Facility: CLINIC | Age: 52
Setting detail: DERMATOLOGY
End: 2022-07-26

## 2022-07-26 DIAGNOSIS — Z85.828 PERSONAL HISTORY OF OTHER MALIGNANT NEOPLASM OF SKIN: ICD-10-CM

## 2022-07-26 DIAGNOSIS — L24.9 IRRITANT CONTACT DERMATITIS, UNSPECIFIED CAUSE: ICD-10-CM

## 2022-07-26 DIAGNOSIS — L81.4 OTHER MELANIN HYPERPIGMENTATION: ICD-10-CM

## 2022-07-26 DIAGNOSIS — L57.8 OTHER SKIN CHANGES DUE TO CHRONIC EXPOSURE TO NONIONIZING RADIATION: ICD-10-CM

## 2022-07-26 DIAGNOSIS — D18.0 HEMANGIOMA: ICD-10-CM

## 2022-07-26 DIAGNOSIS — L28.0 LICHEN SIMPLEX CHRONICUS: ICD-10-CM

## 2022-07-26 DIAGNOSIS — D485 NEOPLASM OF UNCERTAIN BEHAVIOR OF SKIN: ICD-10-CM

## 2022-07-26 PROBLEM — D18.01 HEMANGIOMA OF SKIN AND SUBCUTANEOUS TISSUE: Status: ACTIVE | Noted: 2022-07-26

## 2022-07-26 PROBLEM — D48.5 NEOPLASM OF UNCERTAIN BEHAVIOR OF SKIN: Status: ACTIVE | Noted: 2022-07-26

## 2022-07-26 PROCEDURE — 99213 OFFICE O/P EST LOW 20 MIN: CPT | Mod: 25

## 2022-07-26 PROCEDURE — 69100 BIOPSY OF EXTERNAL EAR: CPT

## 2022-07-26 PROCEDURE — ? PRESCRIPTION

## 2022-07-26 PROCEDURE — ? ADDITIONAL NOTES

## 2022-07-26 PROCEDURE — ? PRESCRIPTION MEDICATION MANAGEMENT

## 2022-07-26 PROCEDURE — ? FULL BODY SKIN EXAM

## 2022-07-26 PROCEDURE — ? PHOTO-DOCUMENTATION

## 2022-07-26 PROCEDURE — ? BIOPSY BY SHAVE METHOD

## 2022-07-26 PROCEDURE — ? COUNSELING

## 2022-07-26 RX ORDER — TRIAMCINOLONE ACETONIDE 1 MG/G
CREAM TOPICAL BID
Qty: 80 | Refills: 3 | Status: CANCELLED
Stop reason: CLARIF

## 2022-07-26 ASSESSMENT — LOCATION DETAILED DESCRIPTION DERM
LOCATION DETAILED: LEFT SUPERIOR MEDIAL UPPER BACK
LOCATION DETAILED: RIGHT ANTERIOR EARLOBE
LOCATION DETAILED: MIDDLE STERNUM
LOCATION DETAILED: SUBXIPHOID
LOCATION DETAILED: RIGHT DORSAL FOOT
LOCATION DETAILED: RIGHT DISTAL DORSAL FOREARM
LOCATION DETAILED: LEFT DORSAL FOOT
LOCATION DETAILED: LEFT SUPERIOR MEDIAL BUCCAL CHEEK
LOCATION DETAILED: LEFT PROXIMAL POSTERIOR UPPER ARM
LOCATION DETAILED: LEFT PROXIMAL DORSAL FOREARM

## 2022-07-26 ASSESSMENT — LOCATION SIMPLE DESCRIPTION DERM
LOCATION SIMPLE: RIGHT FOREARM
LOCATION SIMPLE: RIGHT EAR
LOCATION SIMPLE: LEFT FOREARM
LOCATION SIMPLE: LEFT CHEEK
LOCATION SIMPLE: LEFT POSTERIOR UPPER ARM
LOCATION SIMPLE: RIGHT FOOT
LOCATION SIMPLE: LEFT FOOT
LOCATION SIMPLE: CHEST
LOCATION SIMPLE: ABDOMEN
LOCATION SIMPLE: LEFT UPPER BACK

## 2022-07-26 ASSESSMENT — LOCATION ZONE DERM
LOCATION ZONE: ARM
LOCATION ZONE: TRUNK
LOCATION ZONE: FEET
LOCATION ZONE: FACE
LOCATION ZONE: EAR

## 2022-07-26 NOTE — PROCEDURE: PRESCRIPTION MEDICATION MANAGEMENT
Pt reports that her left hip began hurting on Tuesday and progressed to her left lower back. Hx arthritis in bilateral knees.  Denies trauma to hip area
Render In Strict Bullet Format?: No
Detail Level: Zone
Initiate Treatment: triamcinolone acetonide 0.1 % topical cream: Apply a thin layer BID to rash of left arm
Initiate Treatment: triamcinolone acetonide 0.1 % topical cream: Apply a thin layer BID to rash of feet and toenails

## 2022-07-26 NOTE — PROCEDURE: BIOPSY BY SHAVE METHOD
Detail Level: Detailed
Depth Of Biopsy: dermis
Was A Bandage Applied: Yes
Size Of Lesion In Cm: 0.6
X Size Of Lesion In Cm: 0
Biopsy Type: H and E
Biopsy Method: curette
Anesthesia Type: 1% lidocaine with epinephrine
Anesthesia Volume In Cc (Will Not Render If 0): 0.5
Hemostasis: Electrodesiccation
Wound Care: Petrolatum
Dressing: bandage
Destruction After The Procedure: No
Type Of Destruction Used: Curettage
Curettage Text: The wound bed was treated with curettage after the biopsy was performed.
Cryotherapy Text: The wound bed was treated with cryotherapy after the biopsy was performed.
Electrodesiccation Text: The wound bed was treated with electrodesiccation after the biopsy was performed.
Electrodesiccation And Curettage Text: The wound bed was treated with electrodesiccation and curettage after the biopsy was performed.
Silver Nitrate Text: The wound bed was treated with silver nitrate after the biopsy was performed.
Lab: -[id number]
Consent: Written consent was obtained and risks were reviewed including but not limited to scarring, infection, bleeding, scabbing, incomplete removal, nerve damage and allergy to anesthesia.
Post-Care Instructions: I reviewed with the patient in detail post-care instructions. Patient is to keep the biopsy site dry overnight, and then apply bacitracin twice daily until healed. Patient may apply hydrogen peroxide soaks to remove any crusting.
Notification Instructions: Patient will be notified of biopsy results. However, patient instructed to call the office if not contacted within 2 weeks.
Billing Type: Third-Party Bill
Information: Selecting Yes will display possible errors in your note based on the variables you have selected. This validation is only offered as a suggestion for you. PLEASE NOTE THAT THE VALIDATION TEXT WILL BE REMOVED WHEN YOU FINALIZE YOUR NOTE. IF YOU WANT TO FAX A PRELIMINARY NOTE YOU WILL NEED TO TOGGLE THIS TO 'NO' IF YOU DO NOT WANT IT IN YOUR FAXED NOTE.

## 2022-08-26 ENCOUNTER — PATIENT OUTREACH (OUTPATIENT)
Dept: INTERNAL MEDICINE | Facility: HOSPITAL | Age: 52
End: 2022-08-26
Payer: COMMERCIAL

## 2022-08-26 NOTE — PROGRESS NOTES
Care Gap Team has outreached to Connie Chi on behalf of their primary care provider.      Care Gap Source: [facility]    Care Gap(s) Identified: Breast Cancer Screening    Care Gap Status: Overdue    Outreach via: Telephone    Adult Preventive Wellness Protocol(s) Used: Breast Cancer Screening    Chart Review Completed: Yes  Patient interview completed: Yes  Inclusion Criteria: Met  Exclusion Criteria: None  Patient educated on recommended care: Yes               Appointment provided: No            SWP pt declined to schedule mammogram. Pt agreed to contact me back when she was ready to schedule appt.

## 2022-09-06 ENCOUNTER — APPOINTMENT (RX ONLY)
Dept: URBAN - METROPOLITAN AREA CLINIC 28 | Facility: CLINIC | Age: 52
Setting detail: DERMATOLOGY
End: 2022-09-06

## 2022-09-06 PROBLEM — D03.21 MELANOMA IN SITU OF RIGHT EAR AND EXTERNAL AURICULAR CANAL: Status: ACTIVE | Noted: 2022-09-06

## 2022-09-06 PROCEDURE — ? EXCISION

## 2022-09-06 PROCEDURE — 11642 EXC F/E/E/N/L MAL+MRG 1.1-2: CPT

## 2022-09-06 PROCEDURE — ? COUNSELING

## 2022-09-06 NOTE — PROCEDURE: COUNSELING
Show Follow-Up Variable: Yes
Detail Level: Detailed
When Should The Patient Follow-Up For Their Next Full-Body Skin Exam?: 3 Months

## 2022-09-06 NOTE — PROCEDURE: EXCISION
Surgeon Performing The Repair (Optional): Dr. Rob Roca
Biopsy Photograph Reviewed: Yes
Size Of Lesion In Cm: 0.5
X Size Of Lesion In Cm (Optional): 0
Size Of Margin In Cm: 0.4
Was An Eye Clamp Used?: No
Eye Clamp Note Details: An eye clamp was used during the procedure.
Excision Method: Elliptical
Saucerization Depth: dermis and superficial adipose tissue
Repair Type: None (Simple)
Suturegard Retention Suture: 2-0 Nylon
Retention Suture Bite Size: 3 mm
Length To Time In Minutes Device Was In Place: 10
Number Of Hemigard Strips Per Side: 1
Intermediate / Complex Repair - Final Wound Length In Cm: 1.4
Undermining Type: Entire Wound
Debridement Text: The wound edges were debrided prior to proceeding with the closure to facilitate wound healing.
Helical Rim Text: The closure involved the helical rim.
Vermilion Border Text: The closure involved the vermilion border.
Nostril Rim Text: The closure involved the nostril rim.
Retention Suture Text: Retention sutures were placed to support the closure and prevent dehiscence.
Suture Removal: 14 days
Lab: [id number]
Graft Donor Site Bandage (Optional-Leave Blank If You Don't Want In Note): Steri-strips and a pressure bandage were applied to the donor site.
Epidermal Closure Graft Donor Site (Optional): simple interrupted
Billing Type: Third-Party Bill
Excision Depth: adipose tissue
Scalpel Size: 15 blade
Anesthesia Type: 1% lidocaine with epinephrine
Hemostasis: Pressure
Estimated Blood Loss (Cc): minimal
Detail Level: Detailed
Anesthesia Volume In Cc: 6
Deep Sutures: 5-0 Vicryl
Epidermal Sutures: 4-0 Ethilon
Wound Care: Petrolatum
Dressing: dry sterile dressing
Suturegard Intro: Intraoperative tissue expansion was performed, utilizing the SUTUREGARD device, in order to reduce wound tension.
Suturegard Body: The suture ends were repeatedly re-tightened and re-clamped to achieve the desired tissue expansion.
Hemigard Intro: Due to skin fragility and wound tension, it was decided to use HEMIGARD adhesive retention suture devices to permit a linear closure. The skin was cleaned and dried for a 6cm distance away from the wound. Excessive hair, if present, was removed to allow for adhesion.
Hemigard Postcare Instructions: The HEMIGARD strips are to remain completely dry for at least 5-7 days.
Positioning (Leave Blank If You Do Not Want): The patient was placed in a comfortable position exposing the surgical site.
Pre-Excision Curettage Text (Leave Blank If You Do Not Want): Prior to drawing the surgical margin the visible lesion was removed with electrodesiccation and curettage to clearly define the lesion size.
Complex Repair Preamble Text (Leave Blank If You Do Not Want): Extensive wide undermining was performed.
Intermediate Repair Preamble Text (Leave Blank If You Do Not Want): Undermining was performed with blunt dissection.
Curvilinear Excision Additional Text (Leave Blank If You Do Not Want): The margin was drawn around the clinically apparent lesion.  A curvilinear shape was then drawn on the skin incorporating the lesion and margins.  Incisions were then made along these lines to the appropriate tissue plane and the lesion was extirpated.
Fusiform Excision Additional Text (Leave Blank If You Do Not Want): The margin was drawn around the clinically apparent lesion.  A fusiform shape was then drawn on the skin incorporating the lesion and margins.  Incisions were then made along these lines to the appropriate tissue plane and the lesion was extirpated.
Elliptical Excision Additional Text (Leave Blank If You Do Not Want): The margin was drawn around the clinically apparent lesion.  An elliptical shape was then drawn on the skin incorporating the lesion and margins.  Incisions were then made along these lines to the appropriate tissue plane and the lesion was extirpated.
Saucerization Excision Additional Text (Leave Blank If You Do Not Want): The margin was drawn around the clinically apparent lesion.  Incisions were then made along these lines, in a tangential fashion, to the appropriate tissue plane and the lesion was extirpated.
Slit Excision Additional Text (Leave Blank If You Do Not Want): A linear line was drawn on the skin overlying the lesion. An incision was made slowly until the lesion was visualized.  Once visualized, the lesion was removed with blunt dissection.
Excisional Biopsy Additional Text (Leave Blank If You Do Not Want): The margin was drawn around the clinically apparent lesion. An elliptical shape was then drawn on the skin incorporating the lesion and margins.  Incisions were then made along these lines to the appropriate tissue plane and the lesion was extirpated.
Perilesional Excision Additional Text (Leave Blank If You Do Not Want): The margin was drawn around the clinically apparent lesion. Incisions were then made along these lines to the appropriate tissue plane and the lesion was extirpated.
Repair Performed By Another Provider Text (Leave Blank If You Do Not Want): After the tissue was excised the defect was repaired by another provider.
No Repair - Repaired With Adjacent Surgical Defect Text (Leave Blank If You Do Not Want): After the excision the defect was repaired concurrently with another surgical defect which was in close approximation.
Adjacent Tissue Transfer Text: The defect edges were debeveled with a #15 scalpel blade.  Given the location of the defect and the proximity to free margins an adjacent tissue transfer was deemed most appropriate.  Using a sterile surgical marker, an appropriate flap was drawn incorporating the defect and placing the expected incisions within the relaxed skin tension lines where possible.    The area thus outlined was incised deep to adipose tissue with a #15 scalpel blade.  The skin margins were undermined to an appropriate distance in all directions utilizing iris scissors.
Advancement Flap (Single) Text: The defect edges were debeveled with a #15 scalpel blade.  Given the location of the defect and the proximity to free margins a single advancement flap was deemed most appropriate.  Using a sterile surgical marker, an appropriate advancement flap was drawn incorporating the defect and placing the expected incisions within the relaxed skin tension lines where possible.    The area thus outlined was incised deep to adipose tissue with a #15 scalpel blade.  The skin margins were undermined to an appropriate distance in all directions utilizing iris scissors.
Advancement Flap (Double) Text: The defect edges were debeveled with a #15 scalpel blade.  Given the location of the defect and the proximity to free margins a double advancement flap was deemed most appropriate.  Using a sterile surgical marker, the appropriate advancement flaps were drawn incorporating the defect and placing the expected incisions within the relaxed skin tension lines where possible.    The area thus outlined was incised deep to adipose tissue with a #15 scalpel blade.  The skin margins were undermined to an appropriate distance in all directions utilizing iris scissors.
Burow's Advancement Flap Text: The defect edges were debeveled with a #15 scalpel blade.  Given the location of the defect and the proximity to free margins a Burow's advancement flap was deemed most appropriate.  Using a sterile surgical marker, the appropriate advancement flap was drawn incorporating the defect and placing the expected incisions within the relaxed skin tension lines where possible.    The area thus outlined was incised deep to adipose tissue with a #15 scalpel blade.  The skin margins were undermined to an appropriate distance in all directions utilizing iris scissors.
Chonodrocutaneous Helical Advancement Flap Text: The defect edges were debeveled with a #15 scalpel blade.  Given the location of the defect and the proximity to free margins a chondrocutaneous helical advancement flap was deemed most appropriate.  Using a sterile surgical marker, the appropriate advancement flap was drawn incorporating the defect and placing the expected incisions within the relaxed skin tension lines where possible.    The area thus outlined was incised deep to adipose tissue with a #15 scalpel blade.  The skin margins were undermined to an appropriate distance in all directions utilizing iris scissors.
Crescentic Advancement Flap Text: The defect edges were debeveled with a #15 scalpel blade.  Given the location of the defect and the proximity to free margins a crescentic advancement flap was deemed most appropriate.  Using a sterile surgical marker, the appropriate advancement flap was drawn incorporating the defect and placing the expected incisions within the relaxed skin tension lines where possible.    The area thus outlined was incised deep to adipose tissue with a #15 scalpel blade.  The skin margins were undermined to an appropriate distance in all directions utilizing iris scissors.
A-T Advancement Flap Text: The defect edges were debeveled with a #15 scalpel blade.  Given the location of the defect, shape of the defect and the proximity to free margins an A-T advancement flap was deemed most appropriate.  Using a sterile surgical marker, an appropriate advancement flap was drawn incorporating the defect and placing the expected incisions within the relaxed skin tension lines where possible.    The area thus outlined was incised deep to adipose tissue with a #15 scalpel blade.  The skin margins were undermined to an appropriate distance in all directions utilizing iris scissors.
O-T Advancement Flap Text: The defect edges were debeveled with a #15 scalpel blade.  Given the location of the defect, shape of the defect and the proximity to free margins an O-T advancement flap was deemed most appropriate.  Using a sterile surgical marker, an appropriate advancement flap was drawn incorporating the defect and placing the expected incisions within the relaxed skin tension lines where possible.    The area thus outlined was incised deep to adipose tissue with a #15 scalpel blade.  The skin margins were undermined to an appropriate distance in all directions utilizing iris scissors.
O-L Flap Text: The defect edges were debeveled with a #15 scalpel blade.  Given the location of the defect, shape of the defect and the proximity to free margins an O-L flap was deemed most appropriate.  Using a sterile surgical marker, an appropriate advancement flap was drawn incorporating the defect and placing the expected incisions within the relaxed skin tension lines where possible.    The area thus outlined was incised deep to adipose tissue with a #15 scalpel blade.  The skin margins were undermined to an appropriate distance in all directions utilizing iris scissors.
O-Z Flap Text: The defect edges were debeveled with a #15 scalpel blade.  Given the location of the defect, shape of the defect and the proximity to free margins an O-Z flap was deemed most appropriate.  Using a sterile surgical marker, an appropriate transposition flap was drawn incorporating the defect and placing the expected incisions within the relaxed skin tension lines where possible. The area thus outlined was incised deep to adipose tissue with a #15 scalpel blade.  The skin margins were undermined to an appropriate distance in all directions utilizing iris scissors.
Double O-Z Flap Text: The defect edges were debeveled with a #15 scalpel blade.  Given the location of the defect, shape of the defect and the proximity to free margins a Double O-Z flap was deemed most appropriate.  Using a sterile surgical marker, an appropriate transposition flap was drawn incorporating the defect and placing the expected incisions within the relaxed skin tension lines where possible. The area thus outlined was incised deep to adipose tissue with a #15 scalpel blade.  The skin margins were undermined to an appropriate distance in all directions utilizing iris scissors.
V-Y Flap Text: The defect edges were debeveled with a #15 scalpel blade.  Given the location of the defect, shape of the defect and the proximity to free margins a V-Y flap was deemed most appropriate.  Using a sterile surgical marker, an appropriate advancement flap was drawn incorporating the defect and placing the expected incisions within the relaxed skin tension lines where possible.    The area thus outlined was incised deep to adipose tissue with a #15 scalpel blade.  The skin margins were undermined to an appropriate distance in all directions utilizing iris scissors.
Advancement-Rotation Flap Text: The defect edges were debeveled with a #15 scalpel blade.  Given the location of the defect, shape of the defect and the proximity to free margins an advancement-rotation flap was deemed most appropriate.  Using a sterile surgical marker, an appropriate flap was drawn incorporating the defect and placing the expected incisions within the relaxed skin tension lines where possible. The area thus outlined was incised deep to adipose tissue with a #15 scalpel blade.  The skin margins were undermined to an appropriate distance in all directions utilizing iris scissors.
Mercedes Flap Text: The defect edges were debeveled with a #15 scalpel blade.  Given the location of the defect, shape of the defect and the proximity to free margins a Mercedes flap was deemed most appropriate.  Using a sterile surgical marker, an appropriate advancement flap was drawn incorporating the defect and placing the expected incisions within the relaxed skin tension lines where possible. The area thus outlined was incised deep to adipose tissue with a #15 scalpel blade.  The skin margins were undermined to an appropriate distance in all directions utilizing iris scissors.
Modified Advancement Flap Text: The defect edges were debeveled with a #15 scalpel blade.  Given the location of the defect, shape of the defect and the proximity to free margins a modified advancement flap was deemed most appropriate.  Using a sterile surgical marker, an appropriate advancement flap was drawn incorporating the defect and placing the expected incisions within the relaxed skin tension lines where possible.    The area thus outlined was incised deep to adipose tissue with a #15 scalpel blade.  The skin margins were undermined to an appropriate distance in all directions utilizing iris scissors.
Mucosal Advancement Flap Text: Given the location of the defect, shape of the defect and the proximity to free margins a mucosal advancement flap was deemed most appropriate. Incisions were made with a 15 blade scalpel in the appropriate fashion along the cutaneous vermilion border and the mucosal lip. The remaining actinically damaged mucosal tissue was excised.  The mucosal advancement flap was then elevated to the gingival sulcus with care taken to preserve the neurovascular structures and advanced into the primary defect. Care was taken to ensure that precise realignment of the vermilion border was achieved.
Peng Advancement Flap Text: The defect edges were debeveled with a #15 scalpel blade.  Given the location of the defect, shape of the defect and the proximity to free margins a Peng advancement flap was deemed most appropriate.  Using a sterile surgical marker, an appropriate advancement flap was drawn incorporating the defect and placing the expected incisions within the relaxed skin tension lines where possible. The area thus outlined was incised deep to adipose tissue with a #15 scalpel blade.  The skin margins were undermined to an appropriate distance in all directions utilizing iris scissors.
Hatchet Flap Text: The defect edges were debeveled with a #15 scalpel blade.  Given the location of the defect, shape of the defect and the proximity to free margins a hatchet flap was deemed most appropriate.  Using a sterile surgical marker, an appropriate hatchet flap was drawn incorporating the defect and placing the expected incisions within the relaxed skin tension lines where possible.    The area thus outlined was incised deep to adipose tissue with a #15 scalpel blade.  The skin margins were undermined to an appropriate distance in all directions utilizing iris scissors.
Rotation Flap Text: The defect edges were debeveled with a #15 scalpel blade.  Given the location of the defect, shape of the defect and the proximity to free margins a rotation flap was deemed most appropriate.  Using a sterile surgical marker, an appropriate rotation flap was drawn incorporating the defect and placing the expected incisions within the relaxed skin tension lines where possible.    The area thus outlined was incised deep to adipose tissue with a #15 scalpel blade.  The skin margins were undermined to an appropriate distance in all directions utilizing iris scissors.
Spiral Flap Text: The defect edges were debeveled with a #15 scalpel blade.  Given the location of the defect, shape of the defect and the proximity to free margins a spiral flap was deemed most appropriate.  Using a sterile surgical marker, an appropriate rotation flap was drawn incorporating the defect and placing the expected incisions within the relaxed skin tension lines where possible. The area thus outlined was incised deep to adipose tissue with a #15 scalpel blade.  The skin margins were undermined to an appropriate distance in all directions utilizing iris scissors.
Staged Advancement Flap Text: The defect edges were debeveled with a #15 scalpel blade.  Given the location of the defect, shape of the defect and the proximity to free margins a staged advancement flap was deemed most appropriate.  Using a sterile surgical marker, an appropriate advancement flap was drawn incorporating the defect and placing the expected incisions within the relaxed skin tension lines where possible. The area thus outlined was incised deep to adipose tissue with a #15 scalpel blade.  The skin margins were undermined to an appropriate distance in all directions utilizing iris scissors.
Star Wedge Flap Text: The defect edges were debeveled with a #15 scalpel blade.  Given the location of the defect, shape of the defect and the proximity to free margins a star wedge flap was deemed most appropriate.  Using a sterile surgical marker, an appropriate rotation flap was drawn incorporating the defect and placing the expected incisions within the relaxed skin tension lines where possible. The area thus outlined was incised deep to adipose tissue with a #15 scalpel blade.  The skin margins were undermined to an appropriate distance in all directions utilizing iris scissors.
Transposition Flap Text: The defect edges were debeveled with a #15 scalpel blade.  Given the location of the defect and the proximity to free margins a transposition flap was deemed most appropriate.  Using a sterile surgical marker, an appropriate transposition flap was drawn incorporating the defect.    The area thus outlined was incised deep to adipose tissue with a #15 scalpel blade.  The skin margins were undermined to an appropriate distance in all directions utilizing iris scissors.
Muscle Hinge Flap Text: The defect edges were debeveled with a #15 scalpel blade.  Given the size, depth and location of the defect and the proximity to free margins a muscle hinge flap was deemed most appropriate.  Using a sterile surgical marker, an appropriate hinge flap was drawn incorporating the defect. The area thus outlined was incised with a #15 scalpel blade.  The skin margins were undermined to an appropriate distance in all directions utilizing iris scissors.
Mustarde Flap Text: The defect edges were debeveled with a #15 scalpel blade.  Given the size, depth and location of the defect and the proximity to free margins a Mustarde flap was deemed most appropriate.  Using a sterile surgical marker, an appropriate flap was drawn incorporating the defect. The area thus outlined was incised with a #15 scalpel blade.  The skin margins were undermined to an appropriate distance in all directions utilizing iris scissors.
Nasal Turnover Hinge Flap Text: The defect edges were debeveled with a #15 scalpel blade.  Given the size, depth, location of the defect and the defect being full thickness a nasal turnover hinge flap was deemed most appropriate.  Using a sterile surgical marker, an appropriate hinge flap was drawn incorporating the defect. The area thus outlined was incised with a #15 scalpel blade. The flap was designed to recreate the nasal mucosal lining and the alar rim. The skin margins were undermined to an appropriate distance in all directions utilizing iris scissors.
Nasalis-Muscle-Based Myocutaneous Island Pedicle Flap Text: Using a #15 blade, an incision was made around the donor flap to the level of the nasalis muscle. Wide lateral undermining was then performed in both the subcutaneous plane above the nasalis muscle, and in a submuscular plane just above periosteum. This allowed the formation of a free nasalis muscle axial pedicle (based on the angular artery) which was still attached to the actual cutaneous flap, increasing its mobility and vascular viability. Hemostasis was obtained with pinpoint electrocoagulation. The flap was mobilized into position and the pivotal anchor points positioned and stabilized with buried interrupted sutures. Subcutaneous and dermal tissues were closed in a multilayered fashion with sutures. Tissue redundancies were excised, and the epidermal edges were apposed without significant tension and sutured with sutures.
Orbicularis Oris Muscle Flap Text: The defect edges were debeveled with a #15 scalpel blade.  Given that the defect affected the competency of the oral sphincter an orbicularis oris muscle flap was deemed most appropriate to restore this competency and normal muscle function.  Using a sterile surgical marker, an appropriate flap was drawn incorporating the defect. The area thus outlined was incised with a #15 scalpel blade.
Melolabial Transposition Flap Text: The defect edges were debeveled with a #15 scalpel blade.  Given the location of the defect and the proximity to free margins a melolabial flap was deemed most appropriate.  Using a sterile surgical marker, an appropriate melolabial transposition flap was drawn incorporating the defect.    The area thus outlined was incised deep to adipose tissue with a #15 scalpel blade.  The skin margins were undermined to an appropriate distance in all directions utilizing iris scissors.
Rhombic Flap Text: The defect edges were debeveled with a #15 scalpel blade.  Given the location of the defect and the proximity to free margins a rhombic flap was deemed most appropriate.  Using a sterile surgical marker, an appropriate rhombic flap was drawn incorporating the defect.    The area thus outlined was incised deep to adipose tissue with a #15 scalpel blade.  The skin margins were undermined to an appropriate distance in all directions utilizing iris scissors.
Rhomboid Transposition Flap Text: The defect edges were debeveled with a #15 scalpel blade.  Given the location of the defect and the proximity to free margins a rhomboid transposition flap was deemed most appropriate.  Using a sterile surgical marker, an appropriate rhomboid flap was drawn incorporating the defect.    The area thus outlined was incised deep to adipose tissue with a #15 scalpel blade.  The skin margins were undermined to an appropriate distance in all directions utilizing iris scissors.
Bi-Rhombic Flap Text: The defect edges were debeveled with a #15 scalpel blade.  Given the location of the defect and the proximity to free margins a bi-rhombic flap was deemed most appropriate.  Using a sterile surgical marker, an appropriate rhombic flap was drawn incorporating the defect. The area thus outlined was incised deep to adipose tissue with a #15 scalpel blade.  The skin margins were undermined to an appropriate distance in all directions utilizing iris scissors.
Helical Rim Advancement Flap Text: The defect edges were debeveled with a #15 blade scalpel.  Given the location of the defect and the proximity to free margins (helical rim) a double helical rim advancement flap was deemed most appropriate.  Using a sterile surgical marker, the appropriate advancement flaps were drawn incorporating the defect and placing the expected incisions between the helical rim and antihelix where possible.  The area thus outlined was incised through and through with a #15 scalpel blade.  With a skin hook and iris scissors, the flaps were gently and sharply undermined and freed up.
Bilateral Helical Rim Advancement Flap Text: The defect edges were debeveled with a #15 blade scalpel.  Given the location of the defect and the proximity to free margins (helical rim) a bilateral helical rim advancement flap was deemed most appropriate.  Using a sterile surgical marker, the appropriate advancement flaps were drawn incorporating the defect and placing the expected incisions between the helical rim and antihelix where possible.  The area thus outlined was incised through and through with a #15 scalpel blade.  With a skin hook and iris scissors, the flaps were gently and sharply undermined and freed up.
Ear Star Wedge Flap Text: The defect edges were debeveled with a #15 blade scalpel.  Given the location of the defect and the proximity to free margins (helical rim) an ear star wedge flap was deemed most appropriate.  Using a sterile surgical marker, the appropriate flap was drawn incorporating the defect and placing the expected incisions between the helical rim and antihelix where possible.  The area thus outlined was incised through and through with a #15 scalpel blade.
Banner Transposition Flap Text: The defect edges were debeveled with a #15 scalpel blade.  Given the location of the defect and the proximity to free margins a Banner transposition flap was deemed most appropriate.  Using a sterile surgical marker, an appropriate flap drawn around the defect. The area thus outlined was incised deep to adipose tissue with a #15 scalpel blade.  The skin margins were undermined to an appropriate distance in all directions utilizing iris scissors.
Bilobed Flap Text: The defect edges were debeveled with a #15 scalpel blade.  Given the location of the defect and the proximity to free margins a bilobe flap was deemed most appropriate.  Using a sterile surgical marker, an appropriate bilobe flap drawn around the defect.    The area thus outlined was incised deep to adipose tissue with a #15 scalpel blade.  The skin margins were undermined to an appropriate distance in all directions utilizing iris scissors.
Bilobed Transposition Flap Text: The defect edges were debeveled with a #15 scalpel blade.  Given the location of the defect and the proximity to free margins a bilobed transposition flap was deemed most appropriate.  Using a sterile surgical marker, an appropriate bilobe flap drawn around the defect.    The area thus outlined was incised deep to adipose tissue with a #15 scalpel blade.  The skin margins were undermined to an appropriate distance in all directions utilizing iris scissors.
Trilobed Flap Text: The defect edges were debeveled with a #15 scalpel blade.  Given the location of the defect and the proximity to free margins a trilobed flap was deemed most appropriate.  Using a sterile surgical marker, an appropriate trilobed flap drawn around the defect.    The area thus outlined was incised deep to adipose tissue with a #15 scalpel blade.  The skin margins were undermined to an appropriate distance in all directions utilizing iris scissors.
Dorsal Nasal Flap Text: The defect edges were debeveled with a #15 scalpel blade.  Given the location of the defect and the proximity to free margins a dorsal nasal flap was deemed most appropriate.  Using a sterile surgical marker, an appropriate dorsal nasal flap was drawn around the defect.    The area thus outlined was incised deep to adipose tissue with a #15 scalpel blade.  The skin margins were undermined to an appropriate distance in all directions utilizing iris scissors.
Island Pedicle Flap Text: The defect edges were debeveled with a #15 scalpel blade.  Given the location of the defect, shape of the defect and the proximity to free margins an island pedicle advancement flap was deemed most appropriate.  Using a sterile surgical marker, an appropriate advancement flap was drawn incorporating the defect, outlining the appropriate donor tissue and placing the expected incisions within the relaxed skin tension lines where possible.    The area thus outlined was incised deep to adipose tissue with a #15 scalpel blade.  The skin margins were undermined to an appropriate distance in all directions around the primary defect and laterally outward around the island pedicle utilizing iris scissors.  There was minimal undermining beneath the pedicle flap.
Island Pedicle Flap With Canthal Suspension Text: The defect edges were debeveled with a #15 scalpel blade.  Given the location of the defect, shape of the defect and the proximity to free margins an island pedicle advancement flap was deemed most appropriate.  Using a sterile surgical marker, an appropriate advancement flap was drawn incorporating the defect, outlining the appropriate donor tissue and placing the expected incisions within the relaxed skin tension lines where possible. The area thus outlined was incised deep to adipose tissue with a #15 scalpel blade.  The skin margins were undermined to an appropriate distance in all directions around the primary defect and laterally outward around the island pedicle utilizing iris scissors.  There was minimal undermining beneath the pedicle flap. A suspension suture was placed in the canthal tendon to prevent tension and prevent ectropion.
Alar Island Pedicle Flap Text: The defect edges were debeveled with a #15 scalpel blade.  Given the location of the defect, shape of the defect and the proximity to the alar rim an island pedicle advancement flap was deemed most appropriate.  Using a sterile surgical marker, an appropriate advancement flap was drawn incorporating the defect, outlining the appropriate donor tissue and placing the expected incisions within the nasal ala running parallel to the alar rim. The area thus outlined was incised with a #15 scalpel blade.  The skin margins were undermined minimally to an appropriate distance in all directions around the primary defect and laterally outward around the island pedicle utilizing iris scissors.  There was minimal undermining beneath the pedicle flap.
Double Island Pedicle Flap Text: The defect edges were debeveled with a #15 scalpel blade.  Given the location of the defect, shape of the defect and the proximity to free margins a double island pedicle advancement flap was deemed most appropriate.  Using a sterile surgical marker, an appropriate advancement flap was drawn incorporating the defect, outlining the appropriate donor tissue and placing the expected incisions within the relaxed skin tension lines where possible.    The area thus outlined was incised deep to adipose tissue with a #15 scalpel blade.  The skin margins were undermined to an appropriate distance in all directions around the primary defect and laterally outward around the island pedicle utilizing iris scissors.  There was minimal undermining beneath the pedicle flap.
Island Pedicle Flap-Requiring Vessel Identification Text: The defect edges were debeveled with a #15 scalpel blade.  Given the location of the defect, shape of the defect and the proximity to free margins an island pedicle advancement flap was deemed most appropriate.  Using a sterile surgical marker, an appropriate advancement flap was drawn, based on the axial vessel mentioned above, incorporating the defect, outlining the appropriate donor tissue and placing the expected incisions within the relaxed skin tension lines where possible.    The area thus outlined was incised deep to adipose tissue with a #15 scalpel blade.  The skin margins were undermined to an appropriate distance in all directions around the primary defect and laterally outward around the island pedicle utilizing iris scissors.  There was minimal undermining beneath the pedicle flap.
Keystone Flap Text: The defect edges were debeveled with a #15 scalpel blade.  Given the location of the defect, shape of the defect a keystone flap was deemed most appropriate.  Using a sterile surgical marker, an appropriate keystone flap was drawn incorporating the defect, outlining the appropriate donor tissue and placing the expected incisions within the relaxed skin tension lines where possible. The area thus outlined was incised deep to adipose tissue with a #15 scalpel blade.  The skin margins were undermined to an appropriate distance in all directions around the primary defect and laterally outward around the flap utilizing iris scissors.
O-T Plasty Text: The defect edges were debeveled with a #15 scalpel blade.  Given the location of the defect, shape of the defect and the proximity to free margins an O-T plasty was deemed most appropriate.  Using a sterile surgical marker, an appropriate O-T plasty was drawn incorporating the defect and placing the expected incisions within the relaxed skin tension lines where possible.    The area thus outlined was incised deep to adipose tissue with a #15 scalpel blade.  The skin margins were undermined to an appropriate distance in all directions utilizing iris scissors.
O-Z Plasty Text: The defect edges were debeveled with a #15 scalpel blade.  Given the location of the defect, shape of the defect and the proximity to free margins an O-Z plasty (double transposition flap) was deemed most appropriate.  Using a sterile surgical marker, the appropriate transposition flaps were drawn incorporating the defect and placing the expected incisions within the relaxed skin tension lines where possible.    The area thus outlined was incised deep to adipose tissue with a #15 scalpel blade.  The skin margins were undermined to an appropriate distance in all directions utilizing iris scissors.  Hemostasis was achieved with electrocautery.  The flaps were then transposed into place, one clockwise and the other counterclockwise, and anchored with interrupted buried subcutaneous sutures.
Double O-Z Plasty Text: The defect edges were debeveled with a #15 scalpel blade.  Given the location of the defect, shape of the defect and the proximity to free margins a Double O-Z plasty (double transposition flap) was deemed most appropriate.  Using a sterile surgical marker, the appropriate transposition flaps were drawn incorporating the defect and placing the expected incisions within the relaxed skin tension lines where possible. The area thus outlined was incised deep to adipose tissue with a #15 scalpel blade.  The skin margins were undermined to an appropriate distance in all directions utilizing iris scissors.  Hemostasis was achieved with electrocautery.  The flaps were then transposed into place, one clockwise and the other counterclockwise, and anchored with interrupted buried subcutaneous sutures.
V-Y Plasty Text: The defect edges were debeveled with a #15 scalpel blade.  Given the location of the defect, shape of the defect and the proximity to free margins an V-Y advancement flap was deemed most appropriate.  Using a sterile surgical marker, an appropriate advancement flap was drawn incorporating the defect and placing the expected incisions within the relaxed skin tension lines where possible.    The area thus outlined was incised deep to adipose tissue with a #15 scalpel blade.  The skin margins were undermined to an appropriate distance in all directions utilizing iris scissors.
H Plasty Text: Given the location of the defect, shape of the defect and the proximity to free margins a H-plasty was deemed most appropriate for repair.  Using a sterile surgical marker, the appropriate advancement arms of the H-plasty were drawn incorporating the defect and placing the expected incisions within the relaxed skin tension lines where possible. The area thus outlined was incised deep to adipose tissue with a #15 scalpel blade. The skin margins were undermined to an appropriate distance in all directions utilizing iris scissors.  The opposing advancement arms were then advanced into place in opposite direction and anchored with interrupted buried subcutaneous sutures.
W Plasty Text: The lesion was extirpated to the level of the fat with a #15 scalpel blade.  Given the location of the defect, shape of the defect and the proximity to free margins a W-plasty was deemed most appropriate for repair.  Using a sterile surgical marker, the appropriate transposition arms of the W-plasty were drawn incorporating the defect and placing the expected incisions within the relaxed skin tension lines where possible.    The area thus outlined was incised deep to adipose tissue with a #15 scalpel blade.  The skin margins were undermined to an appropriate distance in all directions utilizing iris scissors.  The opposing transposition arms were then transposed into place in opposite direction and anchored with interrupted buried subcutaneous sutures.
Z Plasty Text: The lesion was extirpated to the level of the fat with a #15 scalpel blade.  Given the location of the defect, shape of the defect and the proximity to free margins a Z-plasty was deemed most appropriate for repair.  Using a sterile surgical marker, the appropriate transposition arms of the Z-plasty were drawn incorporating the defect and placing the expected incisions within the relaxed skin tension lines where possible.    The area thus outlined was incised deep to adipose tissue with a #15 scalpel blade.  The skin margins were undermined to an appropriate distance in all directions utilizing iris scissors.  The opposing transposition arms were then transposed into place in opposite direction and anchored with interrupted buried subcutaneous sutures.
Zygomaticofacial Flap Text: Given the location of the defect, shape of the defect and the proximity to free margins a zygomaticofacial flap was deemed most appropriate for repair.  Using a sterile surgical marker, the appropriate flap was drawn incorporating the defect and placing the expected incisions within the relaxed skin tension lines where possible. The area thus outlined was incised deep to adipose tissue with a #15 scalpel blade with preservation of a vascular pedicle.  The skin margins were undermined to an appropriate distance in all directions utilizing iris scissors.  The flap was then placed into the defect and anchored with interrupted buried subcutaneous sutures.
Cheek Interpolation Flap Text: A decision was made to reconstruct the defect utilizing an interpolation axial flap and a staged reconstruction.  A telfa template was made of the defect.  This telfa template was then used to outline the Cheek Interpolation flap.  The donor area for the pedicle flap was then injected with anesthesia.  The flap was excised through the skin and subcutaneous tissue down to the layer of the underlying musculature.  The interpolation flap was carefully excised within this deep plane to maintain its blood supply.  The edges of the donor site were undermined.   The donor site was closed in a primary fashion.  The pedicle was then rotated into position and sutured.  Once the tube was sutured into place, adequate blood supply was confirmed with blanching and refill.  The pedicle was then wrapped with xeroform gauze and dressed appropriately with a telfa and gauze bandage to ensure continued blood supply and protect the attached pedicle.
Cheek-To-Nose Interpolation Flap Text: A decision was made to reconstruct the defect utilizing an interpolation axial flap and a staged reconstruction.  A telfa template was made of the defect.  This telfa template was then used to outline the Cheek-To-Nose Interpolation flap.  The donor area for the pedicle flap was then injected with anesthesia.  The flap was excised through the skin and subcutaneous tissue down to the layer of the underlying musculature.  The interpolation flap was carefully excised within this deep plane to maintain its blood supply.  The edges of the donor site were undermined.   The donor site was closed in a primary fashion.  The pedicle was then rotated into position and sutured.  Once the tube was sutured into place, adequate blood supply was confirmed with blanching and refill.  The pedicle was then wrapped with xeroform gauze and dressed appropriately with a telfa and gauze bandage to ensure continued blood supply and protect the attached pedicle.
Interpolation Flap Text: A decision was made to reconstruct the defect utilizing an interpolation axial flap and a staged reconstruction.  A telfa template was made of the defect.  This telfa template was then used to outline the interpolation flap.  The donor area for the pedicle flap was then injected with anesthesia.  The flap was excised through the skin and subcutaneous tissue down to the layer of the underlying musculature.  The interpolation flap was carefully excised within this deep plane to maintain its blood supply.  The edges of the donor site were undermined.   The donor site was closed in a primary fashion.  The pedicle was then rotated into position and sutured.  Once the tube was sutured into place, adequate blood supply was confirmed with blanching and refill.  The pedicle was then wrapped with xeroform gauze and dressed appropriately with a telfa and gauze bandage to ensure continued blood supply and protect the attached pedicle.
Melolabial Interpolation Flap Text: A decision was made to reconstruct the defect utilizing an interpolation axial flap and a staged reconstruction.  A telfa template was made of the defect.  This telfa template was then used to outline the melolabial interpolation flap.  The donor area for the pedicle flap was then injected with anesthesia.  The flap was excised through the skin and subcutaneous tissue down to the layer of the underlying musculature.  The pedicle flap was carefully excised within this deep plane to maintain its blood supply.  The edges of the donor site were undermined.   The donor site was closed in a primary fashion.  The pedicle was then rotated into position and sutured.  Once the tube was sutured into place, adequate blood supply was confirmed with blanching and refill.  The pedicle was then wrapped with xeroform gauze and dressed appropriately with a telfa and gauze bandage to ensure continued blood supply and protect the attached pedicle.
Mastoid Interpolation Flap Text: A decision was made to reconstruct the defect utilizing an interpolation axial flap and a staged reconstruction.  A telfa template was made of the defect.  This telfa template was then used to outline the mastoid interpolation flap.  The donor area for the pedicle flap was then injected with anesthesia.  The flap was excised through the skin and subcutaneous tissue down to the layer of the underlying musculature.  The pedicle flap was carefully excised within this deep plane to maintain its blood supply.  The edges of the donor site were undermined.   The donor site was closed in a primary fashion.  The pedicle was then rotated into position and sutured.  Once the tube was sutured into place, adequate blood supply was confirmed with blanching and refill.  The pedicle was then wrapped with xeroform gauze and dressed appropriately with a telfa and gauze bandage to ensure continued blood supply and protect the attached pedicle.
Posterior Auricular Interpolation Flap Text: A decision was made to reconstruct the defect utilizing an interpolation axial flap and a staged reconstruction.  A telfa template was made of the defect.  This telfa template was then used to outline the posterior auricular interpolation flap.  The donor area for the pedicle flap was then injected with anesthesia.  The flap was excised through the skin and subcutaneous tissue down to the layer of the underlying musculature.  The pedicle flap was carefully excised within this deep plane to maintain its blood supply.  The edges of the donor site were undermined.   The donor site was closed in a primary fashion.  The pedicle was then rotated into position and sutured.  Once the tube was sutured into place, adequate blood supply was confirmed with blanching and refill.  The pedicle was then wrapped with xeroform gauze and dressed appropriately with a telfa and gauze bandage to ensure continued blood supply and protect the attached pedicle.
Paramedian Forehead Flap Text: A decision was made to reconstruct the defect utilizing an interpolation axial flap and a staged reconstruction.  A telfa template was made of the defect.  This telfa template was then used to outline the paramedian forehead pedicle flap.  The donor area for the pedicle flap was then injected with anesthesia.  The flap was excised through the skin and subcutaneous tissue down to the layer of the underlying musculature.  The pedicle flap was carefully excised within this deep plane to maintain its blood supply.  The edges of the donor site were undermined.   The donor site was closed in a primary fashion.  The pedicle was then rotated into position and sutured.  Once the tube was sutured into place, adequate blood supply was confirmed with blanching and refill.  The pedicle was then wrapped with xeroform gauze and dressed appropriately with a telfa and gauze bandage to ensure continued blood supply and protect the attached pedicle.
Abbe Flap (Upper To Lower Lip) Text: The defect of the lower lip was assessed and measured.  Given the location and size of the defect, an Abbe flap was deemed most appropriate.  Using a sterile surgical marker, an appropriate Abbe flap was measured and drawn on the upper lip. Local anesthesia was then infiltrated.  A scalpel was then used to incise the upper lip through and through the skin, vermilion, muscle and mucosa, leaving the flap pedicled on the opposite side.  The flap was then rotated and transferred to the lower lip defect.  The flap was then sutured into place with a three layer technique, closing the orbicularis oris muscle layer with subcutaneous buried sutures, followed by a mucosal layer and an epidermal layer.
Abbe Flap (Lower To Upper Lip) Text: The defect of the upper lip was assessed and measured.  Given the location and size of the defect, an Abbe flap was deemed most appropriate.  Using a sterile surgical marker, an appropriate Abbe flap was measured and drawn on the lower lip. Local anesthesia was then infiltrated. A scalpel was then used to incise the upper lip through and through the skin, vermilion, muscle and mucosa, leaving the flap pedicled on the opposite side.  The flap was then rotated and transferred to the lower lip defect.  The flap was then sutured into place with a three layer technique, closing the orbicularis oris muscle layer with subcutaneous buried sutures, followed by a mucosal layer and an epidermal layer.
Estlander Flap (Upper To Lower Lip) Text: The defect of the lower lip was assessed and measured.  Given the location and size of the defect, an Estlander flap was deemed most appropriate.  Using a sterile surgical marker, an appropriate Estlander flap was measured and drawn on the upper lip. Local anesthesia was then infiltrated. A scalpel was then used to incise the lateral aspect of the flap, through skin, muscle and mucosa, leaving the flap pedicled medially.  The flap was then rotated and positioned to fill the lower lip defect.  The flap was then sutured into place with a three layer technique, closing the orbicularis oris muscle layer with subcutaneous buried sutures, followed by a mucosal layer and an epidermal layer.
Lip Wedge Excision Repair Text: Given the location of the defect and the proximity to free margins a full thickness wedge repair was deemed most appropriate.  Using a sterile surgical marker, the appropriate repair was drawn incorporating the defect and placing the expected incisions perpendicular to the vermilion border.  The vermilion border was also meticulously outlined to ensure appropriate reapproximation during the repair.  The area thus outlined was incised through and through with a #15 scalpel blade.  The muscularis and dermis were reaproximated with deep sutures following hemostasis. Care was taken to realign the vermilion border before proceeding with the superficial closure.  Once the vermilion was realigned the superfical and mucosal closure was finished.
Ftsg Text: The defect edges were debeveled with a #15 scalpel blade.  Given the location of the defect, shape of the defect and the proximity to free margins a full thickness skin graft was deemed most appropriate.  Using a sterile surgical marker, the primary defect shape was transferred to the donor site. The area thus outlined was incised deep to adipose tissue with a #15 scalpel blade.  The harvested graft was then trimmed of adipose tissue until only dermis and epidermis was left.  The skin margins of the secondary defect were undermined to an appropriate distance in all directions utilizing iris scissors.  The secondary defect was closed with interrupted buried subcutaneous sutures.  The skin edges were then re-apposed with running  sutures.  The skin graft was then placed in the primary defect and oriented appropriately.
Split-Thickness Skin Graft Text: The defect edges were debeveled with a #15 scalpel blade.  Given the location of the defect, shape of the defect and the proximity to free margins a split thickness skin graft was deemed most appropriate.  Using a sterile surgical marker, the primary defect shape was transferred to the donor site. The split thickness graft was then harvested.  The skin graft was then placed in the primary defect and oriented appropriately.
Burow's Graft Text: The defect edges were debeveled with a #15 scalpel blade.  Given the location of the defect, shape of the defect, the proximity to free margins and the presence of a standing cone deformity a Burow's skin graft was deemed most appropriate. The standing cone was removed and this tissue was then trimmed to the shape of the primary defect. The adipose tissue was also removed until only dermis and epidermis were left.  The skin margins of the secondary defect were undermined to an appropriate distance in all directions utilizing iris scissors.  The secondary defect was closed with interrupted buried subcutaneous sutures.  The skin edges were then re-apposed with running  sutures.  The skin graft was then placed in the primary defect and oriented appropriately.
Cartilage Graft Text: The defect edges were debeveled with a #15 scalpel blade.  Given the location of the defect, shape of the defect, the fact the defect involved a full thickness cartilage defect a cartilage graft was deemed most appropriate.  An appropriate donor site was identified, cleansed, and anesthetized. The cartilage graft was then harvested and transferred to the recipient site, oriented appropriately and then sutured into place.  The secondary defect was then repaired using a primary closure.
Composite Graft Text: The defect edges were debeveled with a #15 scalpel blade.  Given the location of the defect, shape of the defect, the proximity to free margins and the fact the defect was full thickness a composite graft was deemed most appropriate.  The defect was outline and then transferred to the donor site.  A full thickness graft was then excised from the donor site. The graft was then placed in the primary defect, oriented appropriately and then sutured into place.  The secondary defect was then repaired using a primary closure.
Epidermal Autograft Text: The defect edges were debeveled with a #15 scalpel blade.  Given the location of the defect, shape of the defect and the proximity to free margins an epidermal autograft was deemed most appropriate.  Using a sterile surgical marker, the primary defect shape was transferred to the donor site. The epidermal graft was then harvested.  The skin graft was then placed in the primary defect and oriented appropriately.
Dermal Autograft Text: The defect edges were debeveled with a #15 scalpel blade.  Given the location of the defect, shape of the defect and the proximity to free margins a dermal autograft was deemed most appropriate.  Using a sterile surgical marker, the primary defect shape was transferred to the donor site. The area thus outlined was incised deep to adipose tissue with a #15 scalpel blade.  The harvested graft was then trimmed of adipose and epidermal tissue until only dermis was left.  The skin graft was then placed in the primary defect and oriented appropriately.
Skin Substitute Text: The defect edges were debeveled with a #15 scalpel blade.  Given the location of the defect, shape of the defect and the proximity to free margins a skin substitute graft was deemed most appropriate.  The graft material was trimmed to fit the size of the defect. The graft was then placed in the primary defect and oriented appropriately.
Tissue Cultured Epidermal Autograft Text: The defect edges were debeveled with a #15 scalpel blade.  Given the location of the defect, shape of the defect and the proximity to free margins a tissue cultured epidermal autograft was deemed most appropriate.  The graft was then trimmed to fit the size of the defect.  The graft was then placed in the primary defect and oriented appropriately.
Xenograft Text: The defect edges were debeveled with a #15 scalpel blade.  Given the location of the defect, shape of the defect and the proximity to free margins a xenograft was deemed most appropriate.  The graft was then trimmed to fit the size of the defect.  The graft was then placed in the primary defect and oriented appropriately.
Purse String (Intermediate) Text: Given the location of the defect and the characteristics of the surrounding skin a purse string intermediate closure was deemed most appropriate.  Undermining was performed circumfirentially around the surgical defect.  A purse string suture was then placed and tightened.
Purse String (Simple) Text: Given the location of the defect and the characteristics of the surrounding skin a purse string simple closure was deemed most appropriate.  Undermining was performed circumferentially around the surgical defect.  A purse string suture was then placed and tightened.
Partial Purse String (Intermediate) Text: Given the location of the defect and the characteristics of the surrounding skin an intermediate purse string closure was deemed most appropriate.  Undermining was performed circumferentially around the surgical defect.  A purse string suture was then placed and tightened. Wound tension of the circular defect prevented complete closure of the wound.
Partial Purse String (Simple) Text: Given the location of the defect and the characteristics of the surrounding skin a simple purse string closure was deemed most appropriate.  Undermining was performed circumferentially around the surgical defect.  A purse string suture was then placed and tightened. Wound tension of the circular defect prevented complete closure of the wound.
Complex Repair And Single Advancement Flap Text: The defect edges were debeveled with a #15 scalpel blade.  The primary defect was closed partially with a complex linear closure.  Given the location of the remaining defect, shape of the defect and the proximity to free margins a single advancement flap was deemed most appropriate for complete closure of the defect.  Using a sterile surgical marker, an appropriate advancement flap was drawn incorporating the defect and placing the expected incisions within the relaxed skin tension lines where possible.    The area thus outlined was incised deep to adipose tissue with a #15 scalpel blade.  The skin margins were undermined to an appropriate distance in all directions utilizing iris scissors.
Complex Repair And Double Advancement Flap Text: The defect edges were debeveled with a #15 scalpel blade.  The primary defect was closed partially with a complex linear closure.  Given the location of the remaining defect, shape of the defect and the proximity to free margins a double advancement flap was deemed most appropriate for complete closure of the defect.  Using a sterile surgical marker, an appropriate advancement flap was drawn incorporating the defect and placing the expected incisions within the relaxed skin tension lines where possible.    The area thus outlined was incised deep to adipose tissue with a #15 scalpel blade.  The skin margins were undermined to an appropriate distance in all directions utilizing iris scissors.
Complex Repair And Modified Advancement Flap Text: The defect edges were debeveled with a #15 scalpel blade.  The primary defect was closed partially with a complex linear closure.  Given the location of the remaining defect, shape of the defect and the proximity to free margins a modified advancement flap was deemed most appropriate for complete closure of the defect.  Using a sterile surgical marker, an appropriate advancement flap was drawn incorporating the defect and placing the expected incisions within the relaxed skin tension lines where possible.    The area thus outlined was incised deep to adipose tissue with a #15 scalpel blade.  The skin margins were undermined to an appropriate distance in all directions utilizing iris scissors.
Complex Repair And A-T Advancement Flap Text: The defect edges were debeveled with a #15 scalpel blade.  The primary defect was closed partially with a complex linear closure.  Given the location of the remaining defect, shape of the defect and the proximity to free margins an A-T advancement flap was deemed most appropriate for complete closure of the defect.  Using a sterile surgical marker, an appropriate advancement flap was drawn incorporating the defect and placing the expected incisions within the relaxed skin tension lines where possible.    The area thus outlined was incised deep to adipose tissue with a #15 scalpel blade.  The skin margins were undermined to an appropriate distance in all directions utilizing iris scissors.
Complex Repair And O-T Advancement Flap Text: The defect edges were debeveled with a #15 scalpel blade.  The primary defect was closed partially with a complex linear closure.  Given the location of the remaining defect, shape of the defect and the proximity to free margins an O-T advancement flap was deemed most appropriate for complete closure of the defect.  Using a sterile surgical marker, an appropriate advancement flap was drawn incorporating the defect and placing the expected incisions within the relaxed skin tension lines where possible.    The area thus outlined was incised deep to adipose tissue with a #15 scalpel blade.  The skin margins were undermined to an appropriate distance in all directions utilizing iris scissors.
Complex Repair And O-L Flap Text: The defect edges were debeveled with a #15 scalpel blade.  The primary defect was closed partially with a complex linear closure.  Given the location of the remaining defect, shape of the defect and the proximity to free margins an O-L flap was deemed most appropriate for complete closure of the defect.  Using a sterile surgical marker, an appropriate flap was drawn incorporating the defect and placing the expected incisions within the relaxed skin tension lines where possible.    The area thus outlined was incised deep to adipose tissue with a #15 scalpel blade.  The skin margins were undermined to an appropriate distance in all directions utilizing iris scissors.
Complex Repair And Bilobe Flap Text: The defect edges were debeveled with a #15 scalpel blade.  The primary defect was closed partially with a complex linear closure.  Given the location of the remaining defect, shape of the defect and the proximity to free margins a bilobe flap was deemed most appropriate for complete closure of the defect.  Using a sterile surgical marker, an appropriate advancement flap was drawn incorporating the defect and placing the expected incisions within the relaxed skin tension lines where possible.    The area thus outlined was incised deep to adipose tissue with a #15 scalpel blade.  The skin margins were undermined to an appropriate distance in all directions utilizing iris scissors.
Complex Repair And Melolabial Flap Text: The defect edges were debeveled with a #15 scalpel blade.  The primary defect was closed partially with a complex linear closure.  Given the location of the remaining defect, shape of the defect and the proximity to free margins a melolabial flap was deemed most appropriate for complete closure of the defect.  Using a sterile surgical marker, an appropriate advancement flap was drawn incorporating the defect and placing the expected incisions within the relaxed skin tension lines where possible.    The area thus outlined was incised deep to adipose tissue with a #15 scalpel blade.  The skin margins were undermined to an appropriate distance in all directions utilizing iris scissors.
Complex Repair And Rotation Flap Text: The defect edges were debeveled with a #15 scalpel blade.  The primary defect was closed partially with a complex linear closure.  Given the location of the remaining defect, shape of the defect and the proximity to free margins a rotation flap was deemed most appropriate for complete closure of the defect.  Using a sterile surgical marker, an appropriate advancement flap was drawn incorporating the defect and placing the expected incisions within the relaxed skin tension lines where possible.    The area thus outlined was incised deep to adipose tissue with a #15 scalpel blade.  The skin margins were undermined to an appropriate distance in all directions utilizing iris scissors.
Complex Repair And Rhombic Flap Text: The defect edges were debeveled with a #15 scalpel blade.  The primary defect was closed partially with a complex linear closure.  Given the location of the remaining defect, shape of the defect and the proximity to free margins a rhombic flap was deemed most appropriate for complete closure of the defect.  Using a sterile surgical marker, an appropriate advancement flap was drawn incorporating the defect and placing the expected incisions within the relaxed skin tension lines where possible.    The area thus outlined was incised deep to adipose tissue with a #15 scalpel blade.  The skin margins were undermined to an appropriate distance in all directions utilizing iris scissors.
Complex Repair And Transposition Flap Text: The defect edges were debeveled with a #15 scalpel blade.  The primary defect was closed partially with a complex linear closure.  Given the location of the remaining defect, shape of the defect and the proximity to free margins a transposition flap was deemed most appropriate for complete closure of the defect.  Using a sterile surgical marker, an appropriate advancement flap was drawn incorporating the defect and placing the expected incisions within the relaxed skin tension lines where possible.    The area thus outlined was incised deep to adipose tissue with a #15 scalpel blade.  The skin margins were undermined to an appropriate distance in all directions utilizing iris scissors.
Complex Repair And V-Y Plasty Text: The defect edges were debeveled with a #15 scalpel blade.  The primary defect was closed partially with a complex linear closure.  Given the location of the remaining defect, shape of the defect and the proximity to free margins a V-Y plasty was deemed most appropriate for complete closure of the defect.  Using a sterile surgical marker, an appropriate advancement flap was drawn incorporating the defect and placing the expected incisions within the relaxed skin tension lines where possible.    The area thus outlined was incised deep to adipose tissue with a #15 scalpel blade.  The skin margins were undermined to an appropriate distance in all directions utilizing iris scissors.
Complex Repair And M Plasty Text: The defect edges were debeveled with a #15 scalpel blade.  The primary defect was closed partially with a complex linear closure.  Given the location of the remaining defect, shape of the defect and the proximity to free margins an M plasty was deemed most appropriate for complete closure of the defect.  Using a sterile surgical marker, an appropriate advancement flap was drawn incorporating the defect and placing the expected incisions within the relaxed skin tension lines where possible.    The area thus outlined was incised deep to adipose tissue with a #15 scalpel blade.  The skin margins were undermined to an appropriate distance in all directions utilizing iris scissors.
Complex Repair And Double M Plasty Text: The defect edges were debeveled with a #15 scalpel blade.  The primary defect was closed partially with a complex linear closure.  Given the location of the remaining defect, shape of the defect and the proximity to free margins a double M plasty was deemed most appropriate for complete closure of the defect.  Using a sterile surgical marker, an appropriate advancement flap was drawn incorporating the defect and placing the expected incisions within the relaxed skin tension lines where possible.    The area thus outlined was incised deep to adipose tissue with a #15 scalpel blade.  The skin margins were undermined to an appropriate distance in all directions utilizing iris scissors.
Complex Repair And W Plasty Text: The defect edges were debeveled with a #15 scalpel blade.  The primary defect was closed partially with a complex linear closure.  Given the location of the remaining defect, shape of the defect and the proximity to free margins a W plasty was deemed most appropriate for complete closure of the defect.  Using a sterile surgical marker, an appropriate advancement flap was drawn incorporating the defect and placing the expected incisions within the relaxed skin tension lines where possible.    The area thus outlined was incised deep to adipose tissue with a #15 scalpel blade.  The skin margins were undermined to an appropriate distance in all directions utilizing iris scissors.
Complex Repair And Z Plasty Text: The defect edges were debeveled with a #15 scalpel blade.  The primary defect was closed partially with a complex linear closure.  Given the location of the remaining defect, shape of the defect and the proximity to free margins a Z plasty was deemed most appropriate for complete closure of the defect.  Using a sterile surgical marker, an appropriate advancement flap was drawn incorporating the defect and placing the expected incisions within the relaxed skin tension lines where possible.    The area thus outlined was incised deep to adipose tissue with a #15 scalpel blade.  The skin margins were undermined to an appropriate distance in all directions utilizing iris scissors.
Complex Repair And Dorsal Nasal Flap Text: The defect edges were debeveled with a #15 scalpel blade.  The primary defect was closed partially with a complex linear closure.  Given the location of the remaining defect, shape of the defect and the proximity to free margins a dorsal nasal flap was deemed most appropriate for complete closure of the defect.  Using a sterile surgical marker, an appropriate flap was drawn incorporating the defect and placing the expected incisions within the relaxed skin tension lines where possible.    The area thus outlined was incised deep to adipose tissue with a #15 scalpel blade.  The skin margins were undermined to an appropriate distance in all directions utilizing iris scissors.
Complex Repair And Ftsg Text: The defect edges were debeveled with a #15 scalpel blade.  The primary defect was closed partially with a complex linear closure.  Given the location of the defect, shape of the defect and the proximity to free margins a full thickness skin graft was deemed most appropriate to repair the remaining defect.  The graft was trimmed to fit the size of the remaining defect.  The graft was then placed in the primary defect, oriented appropriately, and sutured into place.
Complex Repair And Burow's Graft Text: The defect edges were debeveled with a #15 scalpel blade.  The primary defect was closed partially with a complex linear closure.  Given the location of the defect, shape of the defect, the proximity to free margins and the presence of a standing cone deformity a Burow's graft was deemed most appropriate to repair the remaining defect.  The graft was trimmed to fit the size of the remaining defect.  The graft was then placed in the primary defect, oriented appropriately, and sutured into place.
Complex Repair And Split-Thickness Skin Graft Text: The defect edges were debeveled with a #15 scalpel blade.  The primary defect was closed partially with a complex linear closure.  Given the location of the defect, shape of the defect and the proximity to free margins a split thickness skin graft was deemed most appropriate to repair the remaining defect.  The graft was trimmed to fit the size of the remaining defect.  The graft was then placed in the primary defect, oriented appropriately, and sutured into place.
Complex Repair And Epidermal Autograft Text: The defect edges were debeveled with a #15 scalpel blade.  The primary defect was closed partially with a complex linear closure.  Given the location of the defect, shape of the defect and the proximity to free margins an epidermal autograft was deemed most appropriate to repair the remaining defect.  The graft was trimmed to fit the size of the remaining defect.  The graft was then placed in the primary defect, oriented appropriately, and sutured into place.
Complex Repair And Dermal Autograft Text: The defect edges were debeveled with a #15 scalpel blade.  The primary defect was closed partially with a complex linear closure.  Given the location of the defect, shape of the defect and the proximity to free margins an dermal autograft was deemed most appropriate to repair the remaining defect.  The graft was trimmed to fit the size of the remaining defect.  The graft was then placed in the primary defect, oriented appropriately, and sutured into place.
Complex Repair And Tissue Cultured Epidermal Autograft Text: The defect edges were debeveled with a #15 scalpel blade.  The primary defect was closed partially with a complex linear closure.  Given the location of the defect, shape of the defect and the proximity to free margins an tissue cultured epidermal autograft was deemed most appropriate to repair the remaining defect.  The graft was trimmed to fit the size of the remaining defect.  The graft was then placed in the primary defect, oriented appropriately, and sutured into place.
Complex Repair And Xenograft Text: The defect edges were debeveled with a #15 scalpel blade.  The primary defect was closed partially with a complex linear closure.  Given the location of the defect, shape of the defect and the proximity to free margins a xenograft was deemed most appropriate to repair the remaining defect.  The graft was trimmed to fit the size of the remaining defect.  The graft was then placed in the primary defect, oriented appropriately, and sutured into place.
Complex Repair And Skin Substitute Graft Text: The defect edges were debeveled with a #15 scalpel blade.  The primary defect was closed partially with a complex linear closure.  Given the location of the remaining defect, shape of the defect and the proximity to free margins a skin substitute graft was deemed most appropriate to repair the remaining defect.  The graft was trimmed to fit the size of the remaining defect.  The graft was then placed in the primary defect, oriented appropriately, and sutured into place.
Path Notes (To The Dermatopathologist): Please check margins.
Consent was obtained from the patient. The risks and benefits to therapy were discussed in detail. Specifically, the risks of infection, scarring, bleeding, prolonged wound healing, incomplete removal, allergy to anesthesia, nerve injury and recurrence were addressed. Prior to the procedure, the treatment site was clearly identified and confirmed by the patient. All components of Universal Protocol/PAUSE Rule completed.
Post-Care Instructions: I reviewed with the patient in detail post-care instructions. Patient is not to engage in any heavy lifting, exercise, or swimming for the next 14 days. Should the patient develop any fevers, chills, bleeding, severe pain patient will contact the office immediately.
Home Suture Removal Text: Patient was provided a home suture removal kit and will remove their sutures at home.  If they have any questions or difficulties they will call the office.
Where Do You Want The Question To Include Opioid Counseling Located?: Case Summary Tab
Information: Selecting Yes will display possible errors in your note based on the variables you have selected. This validation is only offered as a suggestion for you. PLEASE NOTE THAT THE VALIDATION TEXT WILL BE REMOVED WHEN YOU FINALIZE YOUR NOTE. IF YOU WANT TO FAX A PRELIMINARY NOTE YOU WILL NEED TO TOGGLE THIS TO 'NO' IF YOU DO NOT WANT IT IN YOUR FAXED NOTE.

## 2022-09-06 NOTE — PROCEDURE: MIPS QUALITY
Quality 137: Melanoma: Continuity Of Care - Recall System: Patient information entered into a recall system that includes: target date for the next exam specified AND a process to follow up with patients regarding missed or unscheduled appointments
Quality 265: Biopsy Follow-Up: Biopsy results reviewed, communicated, tracked, and documented
Detail Level: Detailed
Quality 226: Preventive Care And Screening: Tobacco Use: Screening And Cessation Intervention: Patient screened for tobacco use, is a smoker AND received Cessation Counseling within the Previous 12 Months
Quality 431: Preventive Care And Screening: Unhealthy Alcohol Use - Screening: Patient not identified as an unhealthy alcohol user when screened for unhealthy alcohol use using a systematic screening method

## 2022-09-20 ENCOUNTER — APPOINTMENT (RX ONLY)
Dept: URBAN - METROPOLITAN AREA CLINIC 28 | Facility: CLINIC | Age: 52
Setting detail: DERMATOLOGY
End: 2022-09-20

## 2022-09-20 DIAGNOSIS — Z48.02 ENCOUNTER FOR REMOVAL OF SUTURES: ICD-10-CM

## 2022-09-20 PROCEDURE — ? COUNSELING

## 2022-09-20 PROCEDURE — ? PATHOLOGY DISCUSSION

## 2022-09-20 PROCEDURE — ? SUTURE REMOVAL (GLOBAL PERIOD)

## 2022-09-20 PROCEDURE — 99024 POSTOP FOLLOW-UP VISIT: CPT

## 2022-09-20 ASSESSMENT — LOCATION ZONE DERM: LOCATION ZONE: EAR

## 2022-09-20 ASSESSMENT — LOCATION DETAILED DESCRIPTION DERM: LOCATION DETAILED: LEFT ANTERIOR EARLOBE

## 2022-09-20 ASSESSMENT — LOCATION SIMPLE DESCRIPTION DERM: LOCATION SIMPLE: LEFT EAR

## 2022-09-20 NOTE — PROCEDURE: SUTURE REMOVAL (GLOBAL PERIOD)
Detail Level: Detailed
Add 88481 Cpt? (Important Note: In 2017 The Use Of 27112 Is Being Tracked By Cms To Determine Future Global Period Reimbursement For Global Periods): yes

## 2022-09-20 NOTE — PROCEDURE: MIPS QUALITY
Quality 265: Biopsy Follow-Up: Biopsy results reviewed, communicated, tracked, and documented
Quality 130: Documentation Of Current Medications In The Medical Record: Current Medications Documented
Quality 431: Preventive Care And Screening: Unhealthy Alcohol Use - Screening: Patient not identified as an unhealthy alcohol user when screened for unhealthy alcohol use using a systematic screening method
Quality 137: Melanoma: Continuity Of Care - Recall System: Patient information entered into a recall system that includes: target date for the next exam specified AND a process to follow up with patients regarding missed or unscheduled appointments
Detail Level: Detailed
Quality 226: Preventive Care And Screening: Tobacco Use: Screening And Cessation Intervention: Patient screened for tobacco use, is a smoker AND received Cessation Counseling within the Previous 12 Months

## 2022-10-25 ENCOUNTER — APPOINTMENT (RX ONLY)
Dept: URBAN - METROPOLITAN AREA CLINIC 28 | Facility: CLINIC | Age: 52
Setting detail: DERMATOLOGY
End: 2022-10-25

## 2022-10-25 DIAGNOSIS — D485 NEOPLASM OF UNCERTAIN BEHAVIOR OF SKIN: ICD-10-CM

## 2022-10-25 DIAGNOSIS — Z71.89 OTHER SPECIFIED COUNSELING: ICD-10-CM

## 2022-10-25 DIAGNOSIS — L82.1 OTHER SEBORRHEIC KERATOSIS: ICD-10-CM

## 2022-10-25 DIAGNOSIS — L81.4 OTHER MELANIN HYPERPIGMENTATION: ICD-10-CM

## 2022-10-25 DIAGNOSIS — D18.0 HEMANGIOMA: ICD-10-CM

## 2022-10-25 DIAGNOSIS — D22 MELANOCYTIC NEVI: ICD-10-CM

## 2022-10-25 PROBLEM — D22.5 MELANOCYTIC NEVI OF TRUNK: Status: ACTIVE | Noted: 2022-10-25

## 2022-10-25 PROBLEM — D48.5 NEOPLASM OF UNCERTAIN BEHAVIOR OF SKIN: Status: ACTIVE | Noted: 2022-10-25

## 2022-10-25 PROBLEM — D18.01 HEMANGIOMA OF SKIN AND SUBCUTANEOUS TISSUE: Status: ACTIVE | Noted: 2022-10-25

## 2022-10-25 PROCEDURE — 99213 OFFICE O/P EST LOW 20 MIN: CPT

## 2022-10-25 PROCEDURE — ? DEFER

## 2022-10-25 PROCEDURE — ? SUNSCREEN RECOMMENDATIONS

## 2022-10-25 PROCEDURE — ? FULL BODY SKIN EXAM

## 2022-10-25 PROCEDURE — ? COUNSELING

## 2022-10-25 ASSESSMENT — LOCATION SIMPLE DESCRIPTION DERM
LOCATION SIMPLE: CHEST
LOCATION SIMPLE: LEFT LOWER BACK
LOCATION SIMPLE: LEFT UPPER BACK
LOCATION SIMPLE: ABDOMEN
LOCATION SIMPLE: RIGHT UPPER BACK

## 2022-10-25 ASSESSMENT — LOCATION DETAILED DESCRIPTION DERM
LOCATION DETAILED: LEFT INFERIOR LATERAL MIDBACK
LOCATION DETAILED: LEFT SUPERIOR UPPER BACK
LOCATION DETAILED: PERIUMBILICAL SKIN
LOCATION DETAILED: LEFT INFERIOR MEDIAL UPPER BACK
LOCATION DETAILED: RIGHT SUPERIOR MEDIAL UPPER BACK
LOCATION DETAILED: EPIGASTRIC SKIN
LOCATION DETAILED: MIDDLE STERNUM
LOCATION DETAILED: RIGHT MEDIAL SUPERIOR CHEST
LOCATION DETAILED: LEFT MEDIAL UPPER BACK

## 2022-10-25 ASSESSMENT — LOCATION ZONE DERM: LOCATION ZONE: TRUNK

## 2022-10-25 NOTE — PROCEDURE: MIPS QUALITY
Detail Level: Detailed
Quality 130: Documentation Of Current Medications In The Medical Record: Current Medications Documented
Quality 431: Preventive Care And Screening: Unhealthy Alcohol Use - Screening: Patient not identified as an unhealthy alcohol user when screened for unhealthy alcohol use using a systematic screening method
Quality 226: Preventive Care And Screening: Tobacco Use: Screening And Cessation Intervention: Patient screened for tobacco use, is a smoker AND received Cessation Counseling within the Previous 12 Months
Quality 137: Melanoma: Continuity Of Care - Recall System: Patient information entered into a recall system that includes: target date for the next exam specified AND a process to follow up with patients regarding missed or unscheduled appointments

## 2022-10-25 NOTE — PROCEDURE: DEFER
X Size Of Lesion In Cm (Optional): 0
Introduction Text (Please End With A Colon): The following procedure was deferred;  excision
Procedure To Be Performed At Next Visit: Biopsy by shave method
Detail Level: Detailed
Introduction Text (Please End With A Colon): The following procedure was deferred

## 2022-11-01 ENCOUNTER — APPOINTMENT (RX ONLY)
Dept: URBAN - METROPOLITAN AREA CLINIC 28 | Facility: CLINIC | Age: 52
Setting detail: DERMATOLOGY
End: 2022-11-01

## 2022-11-01 DIAGNOSIS — D22 MELANOCYTIC NEVI: ICD-10-CM

## 2022-11-01 DIAGNOSIS — D485 NEOPLASM OF UNCERTAIN BEHAVIOR OF SKIN: ICD-10-CM

## 2022-11-01 PROBLEM — D48.5 NEOPLASM OF UNCERTAIN BEHAVIOR OF SKIN: Status: ACTIVE | Noted: 2022-11-01

## 2022-11-01 PROCEDURE — ? BIOPSY BY SHAVE METHOD

## 2022-11-01 PROCEDURE — 11103 TANGNTL BX SKIN EA SEP/ADDL: CPT

## 2022-11-01 PROCEDURE — 11102 TANGNTL BX SKIN SINGLE LES: CPT

## 2022-11-01 PROCEDURE — ? PHOTO-DOCUMENTATION

## 2022-11-01 ASSESSMENT — LOCATION SIMPLE DESCRIPTION DERM
LOCATION SIMPLE: ABDOMEN
LOCATION SIMPLE: CHEST

## 2022-11-01 ASSESSMENT — LOCATION DETAILED DESCRIPTION DERM
LOCATION DETAILED: RIGHT MEDIAL SUPERIOR CHEST
LOCATION DETAILED: PERIUMBILICAL SKIN

## 2022-11-01 ASSESSMENT — LOCATION ZONE DERM: LOCATION ZONE: TRUNK

## 2022-11-01 NOTE — PROCEDURE: MIPS QUALITY
Detail Level: Detailed
Quality 137: Melanoma: Continuity Of Care - Recall System: Patient information entered into a recall system that includes: target date for the next exam specified AND a process to follow up with patients regarding missed or unscheduled appointments
Quality 226: Preventive Care And Screening: Tobacco Use: Screening And Cessation Intervention: Patient screened for tobacco use, is a smoker AND received Cessation Counseling within the Previous 12 Months
Quality 130: Documentation Of Current Medications In The Medical Record: Current Medications Documented
Quality 431: Preventive Care And Screening: Unhealthy Alcohol Use - Screening: Patient not identified as an unhealthy alcohol user when screened for unhealthy alcohol use using a systematic screening method

## 2022-12-28 ENCOUNTER — TELEPHONE (OUTPATIENT)
Dept: INTERNAL MEDICINE | Facility: HOSPITAL | Age: 52
End: 2022-12-28
Payer: COMMERCIAL

## 2022-12-28 NOTE — TELEPHONE ENCOUNTER
"Spoke with patient. She stated that her mother tested positive for covid and is in the hospital. Patient tested positive 12/28/22. Patient advised to quarantine for 5 days and use tylenol for her symptoms.    [facility] Respiratory Illness Triage Tool  1. Confirm Patient Demographic Information/PCP  2. Severity of Illness: \" Are you experiencing any of these signs & symptoms?\"  a. Bluish lips or face  b. Severe and constant pain or pressure in the chest  c. Extreme difficulty breathing  i. Gasping for air, unable to walk or talk without catching your breath, severe wheezing  ii. nostrils flaring, grunting, or using extra muscles around neck or chest to breath  d. New disorientation  e. Unconscious or very difficult to wake up  f. New or worsening seizures  g. Low Blood Pressure  i. Too weak to stand, dizziness, lightheaded, feeling cold, pale, clammy skin  h. Dehydration  i. Dry lips/mouth, not urinating much, sunken eyes  i. YES to any= advise they call 911, notify doc of the day.  3. Screen for exposure and symptoms  a. Exposure: \"In the last 2 weeks before you felt sick did you...\"  i. Care for or have close contact with someone with symptoms of COVID-19, tested for COVID-19 or diagnosed with COVID-19  b. Symptoms:   i. Fever  1. Were they able to measure their temp?  2. If no- shaking/chills/sweating? Or feeling very warm to touch?  3. When did this start?   4. Did they take any medications?   ii. Additional symptoms  1. Mild or Moderate Difficulty Breathing?   2. Sore throat?  3. Muscle aches or body aches?  4. Vomiting or diarrhea?   5. New loss of taste or smell?   6. Congestion or runny nose?   7. Fatigue?  8. Headache?  c. Assess for high risk conditions  i. Does patient have:  1. COPD/Chronic Lung disease?  2. Heart disease?   3. High Blood Pressure?   4. Diabetes?   5. Neurologic condition?  6. Weakened Immune System ( HIV, on chronic prednisone or other immune suppressant, transplant or cancer " therapy)  7. Chronic Kidney Disease  8. Liver disease  9. Sickle Cell Disease?   10. Obesity?  11. Pregnancy?   4. Disposition  a. If NO COVID-19 symptoms= reassurance   b. If Yes to COVID 19 Symptoms but not advised to seek emergent care  i. High risk conditions?   1. Schedule patient for available SDS visit, forward triage to appropriate clinician   ii. No High Risk Conditions  1. Schedule patient for Mychart video visit, forward triage to appropriate clinician   iii. Advise patient:  1. Stay home except for medical care  2. Separate themselves from others in the home  3. Wear a mask at all times, wash hands frequently   4. Monitor for alarm symptoms

## 2023-05-05 ENCOUNTER — PATIENT OUTREACH (OUTPATIENT)
Dept: INTERNAL MEDICINE | Facility: HOSPITAL | Age: 53
End: 2023-05-05
Payer: COMMERCIAL

## 2023-05-05 NOTE — PROGRESS NOTES
Care Gap Team has outreached to Connie Chi on behalf of their primary care provider.      Care Gap Source: [facility]    Care Gap(s) Identified: Annual Wellness Visit    Care Gap Status: Overdue    Outreach via: Telephone    Adult Preventive Wellness Protocol(s) Used: Annual Wellness Visit    Chart Review Completed: Yes  Patient interview completed: No  Inclusion Criteria: Met  Exclusion Criteria: None  Patient educated on recommended care: No               Appointment provided: No             Called and left VM for patient letting them know that they are due for their Medicare Annual Wellness Visit.  Asked patient to return my call.

## 2024-01-31 ENCOUNTER — OFFICE VISIT (OUTPATIENT)
Dept: INTERNAL MEDICINE | Facility: HOSPITAL | Age: 54
End: 2024-01-31
Payer: COMMERCIAL

## 2024-01-31 VITALS
DIASTOLIC BLOOD PRESSURE: 61 MMHG | OXYGEN SATURATION: 98 % | BODY MASS INDEX: 20.46 KG/M2 | HEART RATE: 77 BPM | WEIGHT: 142.9 LBS | TEMPERATURE: 97 F | SYSTOLIC BLOOD PRESSURE: 115 MMHG | RESPIRATION RATE: 18 BRPM | HEIGHT: 70 IN

## 2024-01-31 DIAGNOSIS — D50.9 IRON DEFICIENCY ANEMIA, UNSPECIFIED IRON DEFICIENCY ANEMIA TYPE: ICD-10-CM

## 2024-01-31 DIAGNOSIS — Z72.0 TOBACCO USER: ICD-10-CM

## 2024-01-31 DIAGNOSIS — E78.2 MODERATE MIXED HYPERLIPIDEMIA NOT REQUIRING STATIN THERAPY: ICD-10-CM

## 2024-01-31 DIAGNOSIS — J44.9 MILD CHRONIC OBSTRUCTIVE PULMONARY DISEASE (CMS/HCC): ICD-10-CM

## 2024-01-31 DIAGNOSIS — D50.0 IRON DEFICIENCY ANEMIA DUE TO CHRONIC BLOOD LOSS: ICD-10-CM

## 2024-01-31 DIAGNOSIS — K21.9 GERD WITHOUT ESOPHAGITIS: ICD-10-CM

## 2024-01-31 DIAGNOSIS — E78.5 DYSLIPIDEMIA: ICD-10-CM

## 2024-01-31 DIAGNOSIS — Z12.31 SCREENING MAMMOGRAM FOR BREAST CANCER: Primary | ICD-10-CM

## 2024-01-31 RX ORDER — BUPROPION HYDROCHLORIDE 100 MG/1
200 TABLET ORAL DAILY
COMMUNITY

## 2024-01-31 ASSESSMENT — PAIN SCALES - GENERAL: PAINLEVEL: 0-NO PAIN

## 2024-01-31 ASSESSMENT — PATIENT HEALTH QUESTIONNAIRE - PHQ9: SUM OF ALL RESPONSES TO PHQ9 QUESTIONS 1 & 2: 0

## 2024-01-31 NOTE — PROGRESS NOTES
"     [facility]  Internal Medicine Progress Note       BRIEF ATTENDING DOCUMENTATION   I agree with the resident note as documented below with the following comments/additions/exceptions. Please see attestation section of note for attestation and any additional physician documentation not found here.    Type of faculty precepting: Primary care exception    Connie Chi is a 53 y.o. patient with past medical history of ADHD, depression, GERD, ADAM, tobacco use presenting for follow up.    Issues discussed today:  -Follows with psychiatry and is currently cross titrating SSRI and wellbutrin  -Still smoking 8 cigarettes per day. Recent family stress has made it difficult to quit.   -Gynecologist is working with her on downtitrating hormone therapy   -No longer taking PPI for GERD, on H2 blocker PRN     Objective:  Visit Vitals  /61 (BP Location: Left upper arm, Patient Position: Sitting) Comment: v   Pulse 77   Temp 36.1 °C (97 °F) (Temporal)   Resp 18   Ht 1.778 m (5' 10\")   Wt 64.8 kg (142 lb 14.4 oz)   SpO2 98%   BMI 20.50 kg/m²     Relevant Data:    Lab Results   Component Value Date    WBC 8.1 04/12/2022    HGB 14.4 04/12/2022    HCT 42.4 04/12/2022    MCV 85 04/12/2022     04/12/2022     A/P:    Smoking cessation - trial nicotine patches with gum for dual NRT and also working on uptitrating wellbutrin. She is more motivated to quit now that health issues with her parents have subsided.     Shaneka Landry MD       "

## 2024-01-31 NOTE — PROGRESS NOTES
"     [facility]  Internal Medicine Progress Note       ASSESSMENT AND PLAN     GERD without esophagitis  Off PPI, better controlled w/ OTC PRN Famotidine    Iron deficiency anemia due to chronic blood loss  No acute hemorrhage    - recheck CBC to ensure normalization    Tobacco user  Ongoing counseling on smoking cessation    - NRT  - Wellbutrin 200mg qd (primarly for depression)      Dyslipidemia  Recheck lipid panel      Health care maintenance:   Performed at next visit.  Vaccination for  Return in about 6 months (around 7/31/2024).  At next visit: Healthcare maintenance    Seamus Parker MD     SUBJECTIVE     Connieakil Chi is a 53 y.o. year-old female, primary patient of Dr. Parker with a past medical history of anxiety, ADHD, GERD, and ADAM   who presents for follow-up visit.    Interval History: Last seen 4/12/22    Patient states that she is doing much better.  She is seeing her therapist who has been attempting to start to decrease the antidepressants she continues to be on.  She is taking escitalopram 4 times a week.  Additionally bupropion has been increased to 200 mg daily.  She states that her mood is depressed and complaints.    She also has been seeing GYN HRT.  She is taking estradiol 4 times weekly as well.    Unfortunately, patient states that she continues to be used tobacco with about half a pack a day. (8 cigarettes) she has been smoking since the age of 16.    PHYSICAL EXAMINATION     Visit Vitals  /61 (BP Location: Left upper arm, Patient Position: Sitting) Comment: v   Pulse 77   Temp 36.1 °C (97 °F) (Temporal)   Resp 18   Ht 1.778 m (5' 10\")   Wt 64.8 kg (142 lb 14.4 oz)   SpO2 98%   BMI 20.50 kg/m²       Physical Exam  Constitutional:       General: She is not in acute distress.     Appearance: Normal appearance. She is not toxic-appearing.   HENT:      Head: Normocephalic and atraumatic.   Cardiovascular:      Rate and Rhythm: Normal rate and regular " "rhythm.      Pulses: Normal pulses.      Heart sounds: No murmur heard.     No gallop.   Pulmonary:      Effort: Pulmonary effort is normal. No respiratory distress.      Breath sounds: No wheezing or rales.   Abdominal:      General: Abdomen is flat. Bowel sounds are normal. There is no distension.      Palpations: Abdomen is soft.      Tenderness: There is no abdominal tenderness.   Musculoskeletal:      Right lower leg: No edema.      Left lower leg: No edema.   Skin:     General: Skin is warm.   Neurological:      Mental Status: She is alert and oriented to person, place, and time.             LABS / IMAGING / STUDIES        Labs Reviewed:    Last BMP:  Lab Results   Component Value Date     02/29/2024    K 5.0 02/29/2024     02/29/2024    CO2 27 02/29/2024    BUN 11 02/29/2024    CREATININE 0.92 02/29/2024       Last CBC:  Lab Results   Component Value Date    WBC 8.1 04/12/2022    HGB 14.4 04/12/2022    HCT 42.4 04/12/2022    MCV 85 04/12/2022     04/12/2022       Last Lipids:  Lab Results   Component Value Date    CHOL 218 (H) 04/05/2024    HDL 42 04/05/2024    LDLCALC 161 (H) 04/05/2024    TRIG 85 04/05/2024       Last three HgbA1c:  No results found for: \"HGBA1C\"    Last Microalbumin:  No results found for: \"MICROALBUR\"    Last TSH:  Lab Results   Component Value Date    TSH 1.220 04/05/2024       Last Vitamin D:  Lab Results   Component Value Date    GOHH68RR 23.2 (L) 04/12/2022         Studies/Imaging Reviewed:     XRAY SCAN DOCUMENT  Ordered by an unspecified provider.        MEDICATIONS      Current Outpatient Medications   Medication Instructions    buPROPion (WELLBUTRIN) 200 mg, oral, Daily    escitalopram (LEXAPRO) 5 mg, oral, 4 times weekly    estradioL (ESTRACE) 0.5 mg, oral, 4 times weekly    traZODone (DESYREL) 50 mg, oral, Nightly PRN          "

## 2024-02-04 PROBLEM — M79.671 FOOT PAIN, RIGHT: Status: RESOLVED | Noted: 2019-03-22 | Resolved: 2024-02-04

## 2024-02-04 PROBLEM — K29.30 CHRONIC SUPERFICIAL GASTRITIS WITHOUT BLEEDING: Status: RESOLVED | Noted: 2018-04-13 | Resolved: 2024-02-04

## 2024-02-04 PROBLEM — R19.7 DIARRHEA: Status: RESOLVED | Noted: 2018-06-01 | Resolved: 2024-02-04

## 2024-02-04 PROBLEM — R63.4 WEIGHT LOSS: Status: RESOLVED | Noted: 2018-05-24 | Resolved: 2024-02-04

## 2024-02-04 PROBLEM — M25.562 ACUTE PAIN OF LEFT KNEE: Status: RESOLVED | Noted: 2019-03-22 | Resolved: 2024-02-04

## 2024-02-04 NOTE — ASSESSMENT & PLAN NOTE
Ongoing counseling on smoking cessation    - NRT  - Wellbutrin 200mg qd (primarly for depression)

## 2024-03-01 LAB
ALBUMIN SERPL-MCNC: 4.3 G/DL (ref 3.8–4.9)
ALBUMIN/GLOB SERPL: 1.9 {RATIO} (ref 1.2–2.2)
ALP SERPL-CCNC: 85 IU/L (ref 44–121)
ALT SERPL-CCNC: 10 IU/L (ref 0–32)
AST SERPL-CCNC: 12 IU/L (ref 0–40)
BILIRUB SERPL-MCNC: <0.2 MG/DL (ref 0–1.2)
BUN SERPL-MCNC: 11 MG/DL (ref 6–24)
BUN/CREAT SERPL: 12 (ref 9–23)
CALCIUM SERPL-MCNC: 9.5 MG/DL (ref 8.7–10.2)
CHLORIDE SERPL-SCNC: 105 MMOL/L (ref 96–106)
CO2 SERPL-SCNC: 27 MMOL/L (ref 20–29)
CREAT SERPL-MCNC: 0.92 MG/DL (ref 0.57–1)
EGFRCR SERPLBLD CKD-EPI 2021: 74 ML/MIN/1.73
GLOBULIN SER CALC-MCNC: 2.3 G/DL (ref 1.5–4.5)
GLUCOSE SERPL-MCNC: 104 MG/DL (ref 70–99)
POTASSIUM SERPL-SCNC: 5 MMOL/L (ref 3.5–5.2)
PROT SERPL-MCNC: 6.6 G/DL (ref 6–8.5)
SODIUM SERPL-SCNC: 142 MMOL/L (ref 134–144)

## 2024-03-04 ENCOUNTER — APPOINTMENT (RX ONLY)
Dept: URBAN - METROPOLITAN AREA CLINIC 28 | Facility: CLINIC | Age: 54
Setting detail: DERMATOLOGY
End: 2024-03-04

## 2024-03-04 DIAGNOSIS — Z79.899 OTHER LONG TERM (CURRENT) DRUG THERAPY: ICD-10-CM

## 2024-03-04 DIAGNOSIS — L82.0 INFLAMED SEBORRHEIC KERATOSIS: ICD-10-CM

## 2024-03-04 DIAGNOSIS — L64.8 OTHER ANDROGENIC ALOPECIA: ICD-10-CM | Status: INADEQUATELY CONTROLLED

## 2024-03-04 DIAGNOSIS — L57.0 ACTINIC KERATOSIS: ICD-10-CM

## 2024-03-04 DIAGNOSIS — L70.0 ACNE VULGARIS: ICD-10-CM | Status: INADEQUATELY CONTROLLED

## 2024-03-04 DIAGNOSIS — L71.0 PERIORAL DERMATITIS: ICD-10-CM | Status: INADEQUATELY CONTROLLED

## 2024-03-04 PROCEDURE — 17000 DESTRUCT PREMALG LESION: CPT | Mod: 59

## 2024-03-04 PROCEDURE — ? PRESCRIPTION

## 2024-03-04 PROCEDURE — ? ORDER TESTS

## 2024-03-04 PROCEDURE — 17003 DESTRUCT PREMALG LES 2-14: CPT | Mod: 59

## 2024-03-04 PROCEDURE — ? LIQUID NITROGEN

## 2024-03-04 PROCEDURE — 99214 OFFICE O/P EST MOD 30 MIN: CPT | Mod: 25

## 2024-03-04 PROCEDURE — ? BENIGN DESTRUCTION

## 2024-03-04 PROCEDURE — ? HIGH RISK MEDICATION MONITORING

## 2024-03-04 PROCEDURE — 17110 DESTRUCTION B9 LES UP TO 14: CPT

## 2024-03-04 PROCEDURE — ? PRESCRIPTION MEDICATION MANAGEMENT

## 2024-03-04 PROCEDURE — ? COUNSELING

## 2024-03-04 RX ORDER — TACROLIMUS 1 MG/G
1 OINTMENT TOPICAL BID
Qty: 60 | Refills: 2 | Status: ERX | COMMUNITY
Start: 2024-03-04

## 2024-03-04 RX ORDER — TRETINOIN 0.25 MG/G
CREAM TOPICAL QOHS
Qty: 45 | Refills: 3 | Status: ERX | COMMUNITY
Start: 2024-03-04

## 2024-03-04 RX ADMIN — TACROLIMUS 1: 1 OINTMENT TOPICAL at 00:00

## 2024-03-04 RX ADMIN — TRETINOIN: 0.25 CREAM TOPICAL at 00:00

## 2024-03-04 ASSESSMENT — LOCATION SIMPLE DESCRIPTION DERM
LOCATION SIMPLE: LEFT SCALP
LOCATION SIMPLE: LEFT CHEEK
LOCATION SIMPLE: RIGHT NOSE
LOCATION SIMPLE: LEFT EAR
LOCATION SIMPLE: LEFT SHOULDER

## 2024-03-04 ASSESSMENT — LOCATION DETAILED DESCRIPTION DERM
LOCATION DETAILED: LEFT MEDIAL FRONTAL SCALP
LOCATION DETAILED: LEFT CENTRAL MALAR CHEEK
LOCATION DETAILED: LEFT SUPERIOR HELIX
LOCATION DETAILED: LEFT MEDIAL BUCCAL CHEEK
LOCATION DETAILED: RIGHT NASAL SIDEWALL
LOCATION DETAILED: LEFT ANTERIOR SHOULDER
LOCATION DETAILED: LEFT TRIANGULAR FOSSA

## 2024-03-04 ASSESSMENT — LOCATION ZONE DERM
LOCATION ZONE: EAR
LOCATION ZONE: SCALP
LOCATION ZONE: FACE
LOCATION ZONE: NOSE
LOCATION ZONE: ARM

## 2024-03-04 NOTE — PROCEDURE: HIGH RISK MEDICATION MONITORING
Oxybutynin Counseling:  I discussed with the patient the risks of oxybutynin including but not limited to skin rash, drowsiness, dry mouth, difficulty urinating, and blurred vision.
Tazorac Counseling:  Patient advised that medication is irritating and drying.  Patient may need to apply sparingly and wash off after an hour before eventually leaving it on overnight.  The patient verbalized understanding of the proper use and possible adverse effects of tazorac.  All of the patient's questions and concerns were addressed.
Tranexamic Acid Pregnancy And Lactation Text: It is unknown if this medication is safe during pregnancy or breast feeding.
Bactrim Counseling:  I discussed with the patient the risks of sulfa antibiotics including but not limited to GI upset, allergic reaction, drug rash, diarrhea, dizziness, photosensitivity, and yeast infections.  Rarely, more serious reactions can occur including but not limited to aplastic anemia, agranulocytosis, methemoglobinemia, blood dyscrasias, liver or kidney failure, lung infiltrates or desquamative/blistering drug rashes.
Picato Pregnancy And Lactation Text: This medication is Pregnancy Category C. It is unknown if this medication is excreted in breast milk.
Methotrexate Pregnancy And Lactation Text: This medication is Pregnancy Category X and is known to cause fetal harm. This medication is excreted in breast milk.
Imiquimod Counseling:  I discussed with the patient the risks of imiquimod including but not limited to erythema, scaling, itching, weeping, crusting, and pain.  Patient understands that the inflammatory response to imiquimod is variable from person to person and was educated regarded proper titration schedule.  If flu-like symptoms develop, patient knows to discontinue the medication and contact us.
Arava Pregnancy And Lactation Text: This medication is Pregnancy Category X and is absolutely contraindicated during pregnancy. It is unknown if it is excreted in breast milk.
Gabapentin Counseling: I discussed with the patient the risks of gabapentin including but not limited to dizziness, somnolence, fatigue and ataxia.
Wartpeel Pregnancy And Lactation Text: This medication is Pregnancy Category X and contraindicated in pregnancy and in women who may become pregnant. It is unknown if this medication is excreted in breast milk.
Niacinamide Pregnancy And Lactation Text: These medications are considered safe during pregnancy.
Dupixent Pregnancy And Lactation Text: This medication likely crosses the placenta but the risk for the fetus is uncertain. This medication is excreted in breast milk.
Adbry Counseling: I discussed with the patient the risks of tralokinumab including but not limited to eye infection and irritation, cold sores, injection site reactions, worsening of asthma, allergic reactions and increased risk of parasitic infection.  Live vaccines should be avoided while taking tralokinumab. The patient understands that monitoring is required and they must alert us or the primary physician if symptoms of infection or other concerning signs are noted.
Doxepin Counseling:  Patient advised that the medication is sedating and not to drive a car after taking this medication. Patient informed of potential adverse effects including but not limited to dry mouth, urinary retention, and blurry vision.  The patient verbalized understanding of the proper use and possible adverse effects of doxepin.  All of the patient's questions and concerns were addressed.
Azelaic Acid Counseling: Patient counseled that medicine may cause skin irritation and to avoid applying near the eyes.  In the event of skin irritation, the patient was advised to reduce the amount of the drug applied or use it less frequently.   The patient verbalized understanding of the proper use and possible adverse effects of azelaic acid.  All of the patient's questions and concerns were addressed.
Azathioprine Counseling:  I discussed with the patient the risks of azathioprine including but not limited to myelosuppression, immunosuppression, hepatotoxicity, lymphoma, and infections.  The patient understands that monitoring is required including baseline LFTs, Creatinine, possible TPMP genotyping and weekly CBCs for the first month and then every 2 weeks thereafter.  The patient verbalized understanding of the proper use and possible adverse effects of azathioprine.  All of the patient's questions and concerns were addressed.
Sarecycline Counseling: Patient advised regarding possible photosensitivity and discoloration of the teeth, skin, lips, tongue and gums.  Patient instructed to avoid sunlight, if possible.  When exposed to sunlight, patients should wear protective clothing, sunglasses, and sunscreen.  The patient was instructed to call the office immediately if the following severe adverse effects occur:  hearing changes, easy bruising/bleeding, severe headache, or vision changes.  The patient verbalized understanding of the proper use and possible adverse effects of sarecycline.  All of the patient's questions and concerns were addressed.
Dutasteride Pregnancy And Lactation Text: This medication is absolutely contraindicated in women, especially during pregnancy and breast feeding. Feminization of male fetuses is possible if taking while pregnant.
Erythromycin Pregnancy And Lactation Text: This medication is Pregnancy Category B and is considered safe during pregnancy. It is also excreted in breast milk.
Itraconazole Counseling:  I discussed with the patient the risks of itraconazole including but not limited to liver damage, nausea/vomiting, neuropathy, and severe allergy.  The patient understands that this medication is best absorbed when taken with acidic beverages such as non-diet cola or ginger ale.  The patient understands that monitoring is required including baseline LFTs and repeat LFTs at intervals.  The patient understands that they are to contact us or the primary physician if concerning signs are noted.
Clofazimine Counseling:  I discussed with the patient the risks of clofazimine including but not limited to skin and eye pigmentation, liver damage, nausea/vomiting, gastrointestinal bleeding and allergy.
Winlevi Counseling:  I discussed with the patient the risks of topical clascoterone including but not limited to erythema, scaling, itching, and stinging. Patient voiced their understanding.
Infliximab Counseling:  I discussed with the patient the risks of infliximab including but not limited to myelosuppression, immunosuppression, autoimmune hepatitis, demyelinating diseases, lymphoma, and serious infections.  The patient understands that monitoring is required including a PPD at baseline and must alert us or the primary physician if symptoms of infection or other concerning signs are noted.
Sotyktu Counseling:  I discussed the most common side effects of Sotyktu including: common cold, sore throat, sinus infections, cold sores, canker sores, folliculitis, and acne.  I also discussed more serious side effects of Sotyktu including but not limited to: serious allergic reactions; increased risk for infections such as TB; cancers such as lymphomas; rhabdomyolysis and elevated CPK; and elevated triglycerides and liver enzymes. 
Skyrizi Pregnancy And Lactation Text: The risk during pregnancy and breastfeeding is uncertain with this medication.
Azathioprine Pregnancy And Lactation Text: This medication is Pregnancy Category D and isn't considered safe during pregnancy. It is unknown if this medication is excreted in breast milk.
Oxybutynin Pregnancy And Lactation Text: This medication is Pregnancy Category B and is considered safe during pregnancy. It is unknown if it is excreted in breast milk.
Drysol Counseling:  I discussed with the patient the risks of drysol/aluminum chloride including but not limited to skin rash, itching, irritation, burning.
Doxepin Pregnancy And Lactation Text: This medication is Pregnancy Category C and it isn't known if it is safe during pregnancy. It is also excreted in breast milk and breast feeding isn't recommended.
Tazorac Pregnancy And Lactation Text: This medication is not safe during pregnancy. It is unknown if this medication is excreted in breast milk.
Azelaic Acid Pregnancy And Lactation Text: This medication is considered safe during pregnancy and breast feeding.
Protopic Counseling: Patient may experience a mild burning sensation during topical application. Protopic is not approved in children less than 2 years of age. There have been case reports of hematologic and skin malignancies in patients using topical calcineurin inhibitors although causality is questionable.
Enbrel Counseling:  I discussed with the patient the risks of etanercept including but not limited to myelosuppression, immunosuppression, autoimmune hepatitis, demyelinating diseases, lymphoma, and infections.  The patient understands that monitoring is required including a PPD at baseline and must alert us or the primary physician if symptoms of infection or other concerning signs are noted.
Prednisone Counseling:  I discussed with the patient the risks of prolonged use of prednisone including but not limited to weight gain, insomnia, osteoporosis, mood changes, diabetes, susceptibility to infection, glaucoma and high blood pressure.  In cases where prednisone use is prolonged, patients should be monitored with blood pressure checks, serum glucose levels and an eye exam.  Additionally, the patient may need to be placed on GI prophylaxis, PCP prophylaxis, and calcium and vitamin D supplementation and/or a bisphosphonate.  The patient verbalized understanding of the proper use and the possible adverse effects of prednisone.  All of the patient's questions and concerns were addressed.
Adbry Pregnancy And Lactation Text: It is unknown if this medication will adversely affect pregnancy or breast feeding.
Valtrex Counseling: I discussed with the patient the risks of valacyclovir including but not limited to kidney damage, nausea, vomiting and severe allergy.  The patient understands that if the infection seems to be worsening or is not improving, they are to call.
Erivedge Counseling- I discussed with the patient the risks of Erivedge including but not limited to nausea, vomiting, diarrhea, constipation, weight loss, changes in the sense of taste, decreased appetite, muscle spasms, and hair loss.  The patient verbalized understanding of the proper use and possible adverse effects of Erivedge.  All of the patient's questions and concerns were addressed.
Bactrim Pregnancy And Lactation Text: This medication is Pregnancy Category D and is known to cause fetal risk.  It is also excreted in breast milk.
Gabapentin Pregnancy And Lactation Text: This medication is Pregnancy Category C and isn't considered safe during pregnancy. It is excreted in breast milk.
Nsaids Counseling: NSAID Counseling: I discussed with the patient that NSAIDs should be taken with food. Prolonged use of NSAIDs can result in the development of stomach ulcers.  Patient advised to stop taking NSAIDs if abdominal pain occurs.  The patient verbalized understanding of the proper use and possible adverse effects of NSAIDs.  All of the patient's questions and concerns were addressed.
Stelara Counseling:  I discussed with the patient the risks of ustekinumab including but not limited to immunosuppression, malignancy, posterior leukoencephalopathy syndrome, and serious infections.  The patient understands that monitoring is required including a PPD at baseline and must alert us or the primary physician if symptoms of infection or other concerning signs are noted.
Sarecycline Pregnancy And Lactation Text: This medication is Pregnancy Category D and not consider safe during pregnancy. It is also excreted in breast milk.
Detail Level: Detailed
Prednisone Pregnancy And Lactation Text: This medication is Pregnancy Category C and it isn't know if it is safe during pregnancy. This medication is excreted in breast milk.
Metronidazole Counseling:  I discussed with the patient the risks of metronidazole including but not limited to seizures, nausea/vomiting, a metallic taste in the mouth, nausea/vomiting and severe allergy.
Itraconazole Pregnancy And Lactation Text: This medication is Pregnancy Category C and it isn't know if it is safe during pregnancy. It is also excreted in breast milk.
Sotyktu Pregnancy And Lactation Text: There is insufficient data to evaluate whether or not Sotyktu is safe to use during pregnancy.   It is not known if Sotyktu passes into breast milk and whether or not it is safe to use when breastfeeding.  
Winlevi Pregnancy And Lactation Text: This medication is considered safe during pregnancy and breastfeeding.
Benzoyl Peroxide Counseling: Patient counseled that medicine may cause skin irritation and bleach clothing.  In the event of skin irritation, the patient was advised to reduce the amount of the drug applied or use it less frequently.   The patient verbalized understanding of the proper use and possible adverse effects of benzoyl peroxide.  All of the patient's questions and concerns were addressed.
Topical Clindamycin Counseling: Patient counseled that this medication may cause skin irritation or allergic reactions.  In the event of skin irritation, the patient was advised to reduce the amount of the drug applied or use it less frequently.   The patient verbalized understanding of the proper use and possible adverse effects of clindamycin.  All of the patient's questions and concerns were addressed.
Protopic Pregnancy And Lactation Text: This medication is Pregnancy Category C. It is unknown if this medication is excreted in breast milk when applied topically.
Finasteride Male Counseling: Finasteride Counseling:  I discussed with the patient the risks of use of finasteride including but not limited to decreased libido, decreased ejaculate volume, gynecomastia, and depression. Women should not handle medication.  All of the patient's questions and concerns were addressed.
Infliximab Pregnancy And Lactation Text: This medication is Pregnancy Category B and is considered safe during pregnancy. It is unknown if this medication is excreted in breast milk.
Bimzelx Counseling:  I discussed with the patient the risks of Bimzelx including but not limited to depression, immunosuppression, allergic reactions and infections.  The patient understands that monitoring is required including a PPD at baseline and must alert us or the primary physician if symptoms of infection or other concerning signs are noted.
Hydroxyzine Counseling: Patient advised that the medication is sedating and not to drive a car after taking this medication.  Patient informed of potential adverse effects including but not limited to dry mouth, urinary retention, and blurry vision.  The patient verbalized understanding of the proper use and possible adverse effects of hydroxyzine.  All of the patient's questions and concerns were addressed.
Xeljanz Counseling: I discussed with the patient the risks of Xeljanz therapy including increased risk of infection, liver issues, headache, diarrhea, or cold symptoms. Live vaccines should be avoided. They were instructed to call if they have any problems.
Cephalexin Counseling: I counseled the patient regarding use of cephalexin as an antibiotic for prophylactic and/or therapeutic purposes. Cephalexin (commonly prescribed under brand name Keflex) is a cephalosporin antibiotic which is active against numerous classes of bacteria, including most skin bacteria. Side effects may include nausea, diarrhea, gastrointestinal upset, rash, hives, yeast infections, and in rare cases, hepatitis, kidney disease, seizures, fever, confusion, neurologic symptoms, and others. Patients with severe allergies to penicillin medications are cautioned that there is about a 10% incidence of cross-reactivity with cephalosporins. When possible, patients with penicillin allergies should use alternatives to cephalosporins for antibiotic therapy.
Finasteride Female Counseling: Finasteride Counseling:  I discussed with the patient the risks of use of finasteride including but not limited to decreased libido and sexual dysfunction. Explained the teratogenic nature of the medication and stressed the importance of not getting pregnant during treatment. All of the patient's questions and concerns were addressed.
Minoxidil Counseling: Minoxidil is a topical medication which can increase blood flow where it is applied. It is uncertain how this medication increases hair growth. Side effects are uncommon and include stinging and allergic reactions.
Cellcept Counseling:  I discussed with the patient the risks of mycophenolate mofetil including but not limited to infection/immunosuppression, GI upset, hypokalemia, hypercholesterolemia, bone marrow suppression, lymphoproliferative disorders, malignancy, GI ulceration/bleed/perforation, colitis, interstitial lung disease, kidney failure, progressive multifocal leukoencephalopathy, and birth defects.  The patient understands that monitoring is required including a baseline creatinine and regular CBC testing. In addition, patient must alert us immediately if symptoms of infection or other concerning signs are noted.
Nsaids Pregnancy And Lactation Text: These medications are considered safe up to 30 weeks gestation. It is excreted in breast milk.
Propranolol Counseling:  I discussed with the patient the risks of propranolol including but not limited to low heart rate, low blood pressure, low blood sugar, restlessness and increased cold sensitivity. They should call the office if they experience any of these side effects.
Ketoconazole Counseling:   Patient counseled regarding improving absorption with orange juice.  Adverse effects include but are not limited to breast enlargement, headache, diarrhea, nausea, upset stomach, liver function test abnormalities, taste disturbance, and stomach pain.  There is a rare possibility of liver failure that can occur when taking ketoconazole. The patient understands that monitoring of LFTs may be required, especially at baseline. The patient verbalized understanding of the proper use and possible adverse effects of ketoconazole.  All of the patient's questions and concerns were addressed.
Valtrex Pregnancy And Lactation Text: this medication is Pregnancy Category B and is considered safe during pregnancy. This medication is not directly found in breast milk but it's metabolite acyclovir is present.
Glycopyrrolate Counseling:  I discussed with the patient the risks of glycopyrrolate including but not limited to skin rash, drowsiness, dry mouth, difficulty urinating, and blurred vision.
Tetracycline Counseling: Patient counseled regarding possible photosensitivity and increased risk for sunburn.  Patient instructed to avoid sunlight, if possible.  When exposed to sunlight, patients should wear protective clothing, sunglasses, and sunscreen.  The patient was instructed to call the office immediately if the following severe adverse effects occur:  hearing changes, easy bruising/bleeding, severe headache, or vision changes.  The patient verbalized understanding of the proper use and possible adverse effects of tetracycline.  All of the patient's questions and concerns were addressed. Patient understands to avoid pregnancy while on therapy due to potential birth defects.
Metronidazole Pregnancy And Lactation Text: This medication is Pregnancy Category B and considered safe during pregnancy.  It is also excreted in breast milk.
Propranolol Pregnancy And Lactation Text: This medication is Pregnancy Category C and it isn't known if it is safe during pregnancy. It is excreted in breast milk.
Rituxan Counseling:  I discussed with the patient the risks of Rituxan infusions. Side effects can include infusion reactions, severe drug rashes including mucocutaneous reactions, reactivation of latent hepatitis and other infections and rarely progressive multifocal leukoencephalopathy.  All of the patient's questions and concerns were addressed.
Rhofade Counseling: Rhofade is a topical medication which can decrease superficial blood flow where applied. Side effects are uncommon and include stinging, redness and allergic reactions.
Minoxidil Pregnancy And Lactation Text: This medication has not been assigned a Pregnancy Risk Category but animal studies failed to show danger with the topical medication. It is unknown if the medication is excreted in breast milk.
Colchicine Counseling:  Patient counseled regarding adverse effects including but not limited to stomach upset (nausea, vomiting, stomach pain, or diarrhea).  Patient instructed to limit alcohol consumption while taking this medication.  Colchicine may reduce blood counts especially with prolonged use.  The patient understands that monitoring of kidney function and blood counts may be required, especially at baseline. The patient verbalized understanding of the proper use and possible adverse effects of colchicine.  All of the patient's questions and concerns were addressed.
Benzoyl Peroxide Pregnancy And Lactation Text: This medication is Pregnancy Category C. It is unknown if benzoyl peroxide is excreted in breast milk.
Elidel Counseling: Patient may experience a mild burning sensation during topical application. Elidel is not approved in children less than 2 years of age. There have been case reports of hematologic and skin malignancies in patients using topical calcineurin inhibitors although causality is questionable.
Zyclara Counseling:  I discussed with the patient the risks of imiquimod including but not limited to erythema, scaling, itching, weeping, crusting, and pain.  Patient understands that the inflammatory response to imiquimod is variable from person to person and was educated regarded proper titration schedule.  If flu-like symptoms develop, patient knows to discontinue the medication and contact us.
Cibinqo Counseling: I discussed with the patient the risks of Cibinqo therapy including but not limited to common cold, nausea, headache, cold sores, increased blood CPK levels, dizziness, UTIs, fatigue, acne, and vomitting. Live vaccines should be avoided.  This medication has been linked to serious infections; higher rate of mortality; malignancy and lymphoproliferative disorders; major adverse cardiovascular events; thrombosis; thrombocytopenia and lymphopenia; lipid elevations; and retinal detachment.
Rituxan Pregnancy And Lactation Text: This medication is Pregnancy Category C and it isn't know if it is safe during pregnancy. It is unknown if this medication is excreted in breast milk but similar antibodies are known to be excreted.
Humira Counseling:  I discussed with the patient the risks of adalimumab including but not limited to myelosuppression, immunosuppression, autoimmune hepatitis, demyelinating diseases, lymphoma, and serious infections.  The patient understands that monitoring is required including a PPD at baseline and must alert us or the primary physician if symptoms of infection or other concerning signs are noted.
Xelpopz Pregnancy And Lactation Text: This medication is Pregnancy Category D and is not considered safe during pregnancy.  The risk during breast feeding is also uncertain.
Use Enhanced Medication Counseling?: No
Cephalexin Pregnancy And Lactation Text: This medication is Pregnancy Category B and considered safe during pregnancy.  It is also excreted in breast milk but can be used safely for shorter doses.
Taltz Counseling: I discussed with the patient the risks of ixekizumab including but not limited to immunosuppression, serious infections, worsening of inflammatory bowel disease and drug reactions.  The patient understands that monitoring is required including a PPD at baseline and must alert us or the primary physician if symptoms of infection or other concerning signs are noted.
Bimzelx Pregnancy And Lactation Text: This medication crosses the placenta and the safety is uncertain during pregnancy. It is unknown if this medication is present in breast milk.
Hydroxyzine Pregnancy And Lactation Text: This medication is not safe during pregnancy and should not be taken. It is also excreted in breast milk and breast feeding isn't recommended.
Finasteride Pregnancy And Lactation Text: This medication is absolutely contraindicated during pregnancy. It is unknown if it is excreted in breast milk.
Libtayo Counseling- I discussed with the patient the risks of Libtayo including but not limited to nausea, vomiting, diarrhea, and bone or muscle pain.  The patient verbalized understanding of the proper use and possible adverse effects of Libtayo.  All of the patient's questions and concerns were addressed.
Glycopyrrolate Pregnancy And Lactation Text: This medication is Pregnancy Category B and is considered safe during pregnancy. It is unknown if it is excreted breast milk.
Ketoconazole Pregnancy And Lactation Text: This medication is Pregnancy Category C and it isn't know if it is safe during pregnancy. It is also excreted in breast milk and breast feeding isn't recommended.
Olanzapine Counseling- I discussed with the patient the common side effects of olanzapine including but are not limited to: lack of energy, dry mouth, increased appetite, sleepiness, tremor, constipation, dizziness, changes in behavior, or restlessness.  Explained that teenagers are more likely to experience headaches, abdominal pain, pain in the arms or legs, tiredness, and sleepiness.  Serious side effects include but are not limited: increased risk of death in elderly patients who are confused, have memory loss, or dementia-related psychosis; hyperglycemia; increased cholesterol and triglycerides; and weight gain.
Minocycline Counseling: Patient advised regarding possible photosensitivity and discoloration of the teeth, skin, lips, tongue and gums.  Patient instructed to avoid sunlight, if possible.  When exposed to sunlight, patients should wear protective clothing, sunglasses, and sunscreen.  The patient was instructed to call the office immediately if the following severe adverse effects occur:  hearing changes, easy bruising/bleeding, severe headache, or vision changes.  The patient verbalized understanding of the proper use and possible adverse effects of minocycline.  All of the patient's questions and concerns were addressed.
SSKI Counseling:  I discussed with the patient the risks of SSKI including but not limited to thyroid abnormalities, metallic taste, GI upset, fever, headache, acne, arthralgias, paraesthesias, lymphadenopathy, easy bleeding, arrhythmias, and allergic reaction.
Topical Ketoconazole Counseling: Patient counseled that this medication may cause skin irritation or allergic reactions.  In the event of skin irritation, the patient was advised to reduce the amount of the drug applied or use it less frequently.   The patient verbalized understanding of the proper use and possible adverse effects of ketoconazole.  All of the patient's questions and concerns were addressed.
Rhofade Pregnancy And Lactation Text: This medication has not been assigned a Pregnancy Risk Category. It is unknown if the medication is excreted in breast milk.
Mirvaso Counseling: Mirvaso is a topical medication which can decrease superficial blood flow where applied. Side effects are uncommon and include stinging, redness and allergic reactions.
Libtayo Pregnancy And Lactation Text: This medication is contraindicated in pregnancy and when breast feeding.
Hydroxychloroquine Counseling:  I discussed with the patient that a baseline ophthalmologic exam is needed at the start of therapy and every year thereafter while on therapy. A CBC may also be warranted for monitoring.  The side effects of this medication were discussed with the patient, including but not limited to agranulocytosis, aplastic anemia, seizures, rashes, retinopathy, and liver toxicity. Patient instructed to call the office should any adverse effect occur.  The patient verbalized understanding of the proper use and possible adverse effects of Plaquenil.  All the patient's questions and concerns were addressed.
Olanzapine Pregnancy And Lactation Text: This medication is pregnancy category C.   There are no adequate and well controlled trials with olanzapine in pregnant females.  Olanzapine should be used during pregnancy only if the potential benefit justifies the potential risk to the fetus.   In a study in lactating healthy women, olanzapine was excreted in breast milk.  It is recommended that women taking olanzapine should not breast feed.
Clindamycin Counseling: I counseled the patient regarding use of clindamycin as an antibiotic for prophylactic and/or therapeutic purposes. Clindamycin is active against numerous classes of bacteria, including skin bacteria. Side effects may include nausea, diarrhea, gastrointestinal upset, rash, hives, yeast infections, and in rare cases, colitis.
Cyclophosphamide Counseling:  I discussed with the patient the risks of cyclophosphamide including but not limited to hair loss, hormonal abnormalities, decreased fertility, abdominal pain, diarrhea, nausea and vomiting, bone marrow suppression and infection. The patient understands that monitoring is required while taking this medication.
Carac Counseling:  I discussed with the patient the risks of Carac including but not limited to erythema, scaling, itching, weeping, crusting, and pain.
Terbinafine Counseling: Patient counseling regarding adverse effects of terbinafine including but not limited to headache, diarrhea, rash, upset stomach, liver function test abnormalities, itching, taste/smell disturbance, nausea, abdominal pain, and flatulence.  There is a rare possibility of liver failure that can occur when taking terbinafine.  The patient understands that a baseline LFT and kidney function test may be required. The patient verbalized understanding of the proper use and possible adverse effects of terbinafine.  All of the patient's questions and concerns were addressed.
Birth Control Pills Counseling: Birth Control Pill Counseling: I discussed with the patient the potential side effects of OCPs including but not limited to increased risk of stroke, heart attack, thrombophlebitis, deep venous thrombosis, hepatic adenomas, breast changes, GI upset, headaches, and depression.  The patient verbalized understanding of the proper use and possible adverse effects of OCPs. All of the patient's questions and concerns were addressed.
Cimzia Counseling:  I discussed with the patient the risks of Cimzia including but not limited to immunosuppression, allergic reactions and infections.  The patient understands that monitoring is required including a PPD at baseline and must alert us or the primary physician if symptoms of infection or other concerning signs are noted.
Siliq Counseling:  I discussed with the patient the risks of Siliq including but not limited to new or worsening depression, suicidal thoughts and behavior, immunosuppression, malignancy, posterior leukoencephalopathy syndrome, and serious infections.  The patient understands that monitoring is required including a PPD at baseline and must alert us or the primary physician if symptoms of infection or other concerning signs are noted. There is also a special program designed to monitor depression which is required with Siliq.
Albendazole Counseling:  I discussed with the patient the risks of albendazole including but not limited to cytopenia, kidney damage, nausea/vomiting and severe allergy.  The patient understands that this medication is being used in an off-label manner.
Cibinqo Pregnancy And Lactation Text: It is unknown if this medication will adversely affect pregnancy or breast feeding.  You should not take this medication if you are currently pregnant or planning a pregnancy or while breastfeeding.
Cyclophosphamide Pregnancy And Lactation Text: This medication is Pregnancy Category D and it isn't considered safe during pregnancy. This medication is excreted in breast milk.
Oral Minoxidil Counseling- I discussed with the patient the risks of oral minoxidil including but not limited to shortness of breath, swelling of the feet or ankles, dizziness, lightheadedness, unwanted hair growth and allergic reaction.  The patient verbalized understanding of the proper use and possible adverse effects of oral minoxidil.  All of the patient's questions and concerns were addressed.
Sski Pregnancy And Lactation Text: This medication is Pregnancy Category D and isn't considered safe during pregnancy. It is excreted in breast milk.
Eucrisa Counseling: Patient may experience a mild burning sensation during topical application. Eucrisa is not approved in children less than 3 months of age.
Hydroxychloroquine Pregnancy And Lactation Text: This medication has been shown to cause fetal harm but it isn't assigned a Pregnancy Risk Category. There are small amounts excreted in breast milk.
Solaraze Counseling:  I discussed with the patient the risks of Solaraze including but not limited to erythema, scaling, itching, weeping, crusting, and pain.
Clindamycin Pregnancy And Lactation Text: This medication can be used in pregnancy if certain situations. Clindamycin is also present in breast milk.
Cimzia Pregnancy And Lactation Text: This medication crosses the placenta but can be considered safe in certain situations. Cimzia may be excreted in breast milk.
Acitretin Counseling:  I discussed with the patient the risks of acitretin including but not limited to hair loss, dry lips/skin/eyes, liver damage, hyperlipidemia, depression/suicidal ideation, photosensitivity.  Serious rare side effects can include but are not limited to pancreatitis, pseudotumor cerebri, bony changes, clot formation/stroke/heart attack.  Patient understands that alcohol is contraindicated since it can result in liver toxicity and significantly prolong the elimination of the drug by many years.
Odomzo Counseling- I discussed with the patient the risks of Odomzo including but not limited to nausea, vomiting, diarrhea, constipation, weight loss, changes in the sense of taste, decreased appetite, muscle spasms, and hair loss.  The patient verbalized understanding of the proper use and possible adverse effects of Odomzo.  All of the patient's questions and concerns were addressed.
Tremfya Counseling: I discussed with the patient the risks of guselkumab including but not limited to immunosuppression, serious infections, and drug reactions.  The patient understands that monitoring is required including a PPD at baseline and must alert us or the primary physician if symptoms of infection or other concerning signs are noted.
Quinolones Counseling:  I discussed with the patient the risks of fluoroquinolones including but not limited to GI upset, allergic reaction, drug rash, diarrhea, dizziness, photosensitivity, yeast infections, liver function test abnormalities, tendonitis/tendon rupture.
Acitretin Pregnancy And Lactation Text: This medication is Pregnancy Category X and should not be given to women who are pregnant or may become pregnant in the future. This medication is excreted in breast milk.
Opioid Counseling: I discussed with the patient the potential side effects of opioids including but not limited to addiction, altered mental status, and depression. I stressed avoiding alcohol, benzodiazepines, muscle relaxants and sleep aids unless specifically okayed by a physician. The patient verbalized understanding of the proper use and possible adverse effects of opioids. All of the patient's questions and concerns were addressed. They were instructed to flush the remaining pills down the toilet if they did not need them for pain.
Fluconazole Counseling:  Patient counseled regarding adverse effects of fluconazole including but not limited to headache, diarrhea, nausea, upset stomach, liver function test abnormalities, taste disturbance, and stomach pain.  There is a rare possibility of liver failure that can occur when taking fluconazole.  The patient understands that monitoring of LFTs and kidney function test may be required, especially at baseline. The patient verbalized understanding of the proper use and possible adverse effects of fluconazole.  All of the patient's questions and concerns were addressed.
Calcipotriene Counseling:  I discussed with the patient the risks of calcipotriene including but not limited to erythema, scaling, itching, and irritation.
Topical Sulfur Applications Counseling: Topical Sulfur Counseling: Patient counseled that this medication may cause skin irritation or allergic reactions.  In the event of skin irritation, the patient was advised to reduce the amount of the drug applied or use it less frequently.   The patient verbalized understanding of the proper use and possible adverse effects of topical sulfur application.  All of the patient's questions and concerns were addressed.
Birth Control Pills Pregnancy And Lactation Text: This medication should be avoided if pregnant and for the first 30 days post-partum.
Bexarotene Pregnancy And Lactation Text: This medication is Pregnancy Category X and should not be given to women who are pregnant or may become pregnant. This medication should not be used if you are breast feeding.
Olumiant Counseling: I discussed with the patient the risks of Olumiant therapy including but not limited to upper respiratory tract infections, shingles, cold sores, and nausea. Live vaccines should be avoided.  This medication has been linked to serious infections; higher rate of mortality; malignancy and lymphoproliferative disorders; major adverse cardiovascular events; thrombosis; gastrointestinal perforations; neutropenia; lymphopenia; anemia; liver enzyme elevations; and lipid elevations.
Albendazole Pregnancy And Lactation Text: This medication is Pregnancy Category C and it isn't known if it is safe during pregnancy. It is also excreted in breast milk.
Terbinafine Pregnancy And Lactation Text: This medication is Pregnancy Category B and is considered safe during pregnancy. It is also excreted in breast milk and breast feeding isn't recommended.
Solaraze Pregnancy And Lactation Text: This medication is Pregnancy Category B and is considered safe. There is some data to suggest avoiding during the third trimester. It is unknown if this medication is excreted in breast milk.
Opzelura Counseling:  I discussed with the patient the risks of Opzelura including but not limited to nasopharngitis, bronchitis, ear infection, eosinophila, hives, diarrhea, folliculitis, tonsillitis, and rhinorrhea.  Taken orally, this medication has been linked to serious infections; higher rate of mortality; malignancy and lymphoproliferative disorders; major adverse cardiovascular events; thrombosis; thrombocytopenia, anemia, and neutropenia; and lipid elevations.
Spironolactone Counseling: Patient advised regarding risks of diarrhea, abdominal pain, hyperkalemia, birth defects (for female patients), liver toxicity and renal toxicity. The patient may need blood work to monitor liver and kidney function and potassium levels while on therapy. The patient verbalized understanding of the proper use and possible adverse effects of spironolactone.  All of the patient's questions and concerns were addressed.
Oral Minoxidil Pregnancy And Lactation Text: This medication should only be used when clearly needed if you are pregnant, attempting to become pregnant or breast feeding.
Thalidomide Counseling: I discussed with the patient the risks of thalidomide including but not limited to birth defects, anxiety, weakness, chest pain, dizziness, cough and severe allergy.
Cyclosporine Counseling:  I discussed with the patient the risks of cyclosporine including but not limited to hypertension, gingival hyperplasia,myelosuppression, immunosuppression, liver damage, kidney damage, neurotoxicity, lymphoma, and serious infections. The patient understands that monitoring is required including baseline blood pressure, CBC, CMP, lipid panel and uric acid, and then 1-2 times monthly CMP and blood pressure.
Opioid Pregnancy And Lactation Text: These medications can lead to premature delivery and should be avoided during pregnancy. These medications are also present in breast milk in small amounts.
Doxycycline Counseling:  Patient counseled regarding possible photosensitivity and increased risk for sunburn.  Patient instructed to avoid sunlight, if possible.  When exposed to sunlight, patients should wear protective clothing, sunglasses, and sunscreen.  The patient was instructed to call the office immediately if the following severe adverse effects occur:  hearing changes, easy bruising/bleeding, severe headache, or vision changes.  The patient verbalized understanding of the proper use and possible adverse effects of doxycycline.  All of the patient's questions and concerns were addressed.
Cosentyx Counseling:  I discussed with the patient the risks of Cosentyx including but not limited to worsening of Crohn's disease, immunosuppression, allergic reactions and infections.  The patient understands that monitoring is required including a PPD at baseline and must alert us or the primary physician if symptoms of infection or other concerning signs are noted.
Low Dose Naltrexone Counseling- I discussed with the patient the potential risks and side effects of low dose naltrexone including but not limited to: more vivid dreams, headaches, nausea, vomiting, abdominal pain, fatigue, dizziness, and anxiety.
Isotretinoin Counseling: Patient should get monthly blood tests, not donate blood, not drive at night if vision affected, not share medication, and not undergo elective surgery for 6 months after tx completed. Side effects reviewed, pt to contact office should one occur.
Ivermectin Counseling:  Patient instructed to take medication on an empty stomach with a full glass of water.  Patient informed of potential adverse effects including but not limited to nausea, diarrhea, dizziness, itching, and swelling of the extremities or lymph nodes.  The patient verbalized understanding of the proper use and possible adverse effects of ivermectin.  All of the patient's questions and concerns were addressed.
High Dose Vitamin A Counseling: Side effects reviewed, pt to contact office should one occur.
Calcipotriene Pregnancy And Lactation Text: This medication has not been proven safe during pregnancy. It is unknown if this medication is excreted in breast milk.
Olumiant Pregnancy And Lactation Text: Based on animal studies, Olumiant may cause embryo-fetal harm when administered to pregnant women.  The medication should not be used in pregnancy.  Breastfeeding is not recommended during treatment.
Simponi Counseling:  I discussed with the patient the risks of golimumab including but not limited to myelosuppression, immunosuppression, autoimmune hepatitis, demyelinating diseases, lymphoma, and serious infections.  The patient understands that monitoring is required including a PPD at baseline and must alert us or the primary physician if symptoms of infection or other concerning signs are noted.
Topical Sulfur Applications Pregnancy And Lactation Text: This medication is considered safe during pregnancy and breast feeding secondary to limited systemic absorption.
Azithromycin Counseling:  I discussed with the patient the risks of azithromycin including but not limited to GI upset, allergic reaction, drug rash, diarrhea, and yeast infections.
Topical Retinoid counseling:  Patient advised to apply a pea-sized amount only at bedtime and wait 30 minutes after washing their face before applying.  If too drying, patient may add a non-comedogenic moisturizer. The patient verbalized understanding of the proper use and possible adverse effects of retinoids.  All of the patient's questions and concerns were addressed.
Opzelura Pregnancy And Lactation Text: There is insufficient data to evaluate drug-associated risk for major birth defects, miscarriage, or other adverse maternal or fetal outcomes.  There is a pregnancy registry that monitors pregnancy outcomes in pregnant persons exposed to the medication during pregnancy.  It is unknown if this medication is excreted in breast milk.  Do not breastfeed during treatment and for about 4 weeks after the last dose.
Hydroquinone Counseling:  Patient advised that medication may result in skin irritation, lightening (hypopigmentation), dryness, and burning.  In the event of skin irritation, the patient was advised to reduce the amount of the drug applied or use it less frequently.  Rarely, spots that are treated with hydroquinone can become darker (pseudoochronosis).  Should this occur, patient instructed to stop medication and call the office. The patient verbalized understanding of the proper use and possible adverse effects of hydroquinone.  All of the patient's questions and concerns were addressed.
Xolair Counseling:  Patient informed of potential adverse effects including but not limited to fever, muscle aches, rash and allergic reactions.  The patient verbalized understanding of the proper use and possible adverse effects of Xolair.  All of the patient's questions and concerns were addressed.
Rinvoq Counseling: I discussed with the patient the risks of Rinvoq therapy including but not limited to upper respiratory tract infections, shingles, cold sores, bronchitis, nausea, cough, fever, acne, and headache. Live vaccines should be avoided.  This medication has been linked to serious infections; higher rate of mortality; malignancy and lymphoproliferative disorders; major adverse cardiovascular events; thrombosis; thrombocytopenia, anemia, and neutropenia; lipid elevations; liver enzyme elevations; and gastrointestinal perforations.
Dutasteride Male Counseling: Dustasteride Counseling:  I discussed with the patient the risks of use of dutasteride including but not limited to decreased libido, decreased ejaculate volume, and gynecomastia. Women who can become pregnant should not handle medication.  All of the patient's questions and concerns were addressed.
Spironolactone Pregnancy And Lactation Text: This medication can cause feminization of the male fetus and should be avoided during pregnancy. The active metabolite is also found in breast milk.
Dapsone Counseling: I discussed with the patient the risks of dapsone including but not limited to hemolytic anemia, agranulocytosis, rashes, methemoglobinemia, kidney failure, peripheral neuropathy, headaches, GI upset, and liver toxicity.  Patients who start dapsone require monitoring including baseline LFTs and weekly CBCs for the first month, then every month thereafter.  The patient verbalized understanding of the proper use and possible adverse effects of dapsone.  All of the patient's questions and concerns were addressed.
Cimetidine Counseling:  I discussed with the patient the risks of Cimetidine including but not limited to gynecomastia, headache, diarrhea, nausea, drowsiness, arrhythmias, pancreatitis, skin rashes, psychosis, bone marrow suppression and kidney toxicity.
Bexarotene Counseling:  I discussed with the patient the risks of bexarotene including but not limited to hair loss, dry lips/skin/eyes, liver abnormalities, hyperlipidemia, pancreatitis, depression/suicidal ideation, photosensitivity, drug rash/allergic reactions, hypothyroidism, anemia, leukopenia, infection, cataracts, and teratogenicity.  Patient understands that they will need regular blood tests to check lipid profile, liver function tests, white blood cell count, thyroid function tests and pregnancy test if applicable.
Isotretinoin Pregnancy And Lactation Text: This medication is Pregnancy Category X and is considered extremely dangerous during pregnancy. It is unknown if it is excreted in breast milk.
Low Dose Naltrexone Pregnancy And Lactation Text: Naltrexone is pregnancy category C.  There have been no adequate and well-controlled studies in pregnant women.  It should be used in pregnancy only if the potential benefit justifies the potential risk to the fetus.   Limited data indicates that naltrexone is minimally excreted into breastmilk.
Griseofulvin Counseling:  I discussed with the patient the risks of griseofulvin including but not limited to photosensitivity, cytopenia, liver damage, nausea/vomiting and severe allergy.  The patient understands that this medication is best absorbed when taken with a fatty meal (e.g., ice cream or french fries).
Otezla Counseling: The side effects of Otezla were discussed with the patient, including but not limited to worsening or new depression, weight loss, diarrhea, nausea, upper respiratory tract infection, and headache. Patient instructed to call the office should any adverse effect occur.  The patient verbalized understanding of the proper use and possible adverse effects of Otezla.  All the patient's questions and concerns were addressed.
High Dose Vitamin A Pregnancy And Lactation Text: High dose vitamin A therapy is contraindicated during pregnancy and breast feeding.
Doxycycline Pregnancy And Lactation Text: This medication is Pregnancy Category D and not consider safe during pregnancy. It is also excreted in breast milk but is considered safe for shorter treatment courses.
Niacinamide Counseling: I recommended taking niacin or niacinamide, also know as vitamin B3, twice daily. Recent evidence suggests that taking vitamin B3 (500 mg twice daily) can reduce the risk of actinic keratoses and non-melanoma skin cancers. Side effects of vitamin B3 include flushing and headache.
Otezla Pregnancy And Lactation Text: This medication is Pregnancy Category C and it isn't known if it is safe during pregnancy. It is unknown if it is excreted in breast milk.
Ilumya Counseling: I discussed with the patient the risks of tildrakizumab including but not limited to immunosuppression, malignancy, posterior leukoencephalopathy syndrome, and serious infections.  The patient understands that monitoring is required including a PPD at baseline and must alert us or the primary physician if symptoms of infection or other concerning signs are noted.
Rifampin Counseling: I discussed with the patient the risks of rifampin including but not limited to liver damage, kidney damage, red-orange body fluids, nausea/vomiting and severe allergy.
Methotrexate Counseling:  Patient counseled regarding adverse effects of methotrexate including but not limited to nausea, vomiting, abnormalities in liver function tests. Patients may develop mouth sores, rash, diarrhea, and abnormalities in blood counts. The patient understands that monitoring is required including LFT's and blood counts.  There is a rare possibility of scarring of the liver and lung problems that can occur when taking methotrexate. Persistent nausea, loss of appetite, pale stools, dark urine, cough, and shortness of breath should be reported immediately. Patient advised to discontinue methotrexate treatment at least three months before attempting to become pregnant.  I discussed the need for folate supplements while taking methotrexate.  These supplements can decrease side effects during methotrexate treatment. The patient verbalized understanding of the proper use and possible adverse effects of methotrexate.  All of the patient's questions and concerns were addressed.
Tranexamic Acid Counseling:  Patient advised of the small risk of bleeding problems with tranexamic acid. They were also instructed to call if they developed any nausea, vomiting or diarrhea. All of the patient's questions and concerns were addressed.
Azithromycin Pregnancy And Lactation Text: This medication is considered safe during pregnancy and is also secreted in breast milk.
Picato Counseling:  I discussed with the patient the risks of Picato including but not limited to erythema, scaling, itching, weeping, crusting, and pain.
Arava Counseling:  Patient counseled regarding adverse effects of Arava including but not limited to nausea, vomiting, abnormalities in liver function tests. Patients may develop mouth sores, rash, diarrhea, and abnormalities in blood counts. The patient understands that monitoring is required including LFTs and blood counts.  There is a rare possibility of scarring of the liver and lung problems that can occur when taking methotrexate. Persistent nausea, loss of appetite, pale stools, dark urine, cough, and shortness of breath should be reported immediately. Patient advised to discontinue Arava treatment and consult with a physician prior to attempting conception. The patient will have to undergo a treatment to eliminate Arava from the body prior to conception.
Wartpeel Counseling:  I discussed with the patient the risks of Wartpeel including but not limited to erythema, scaling, itching, weeping, crusting, and pain.
5-Fu Counseling: 5-Fluorouracil Counseling:  I discussed with the patient the risks of 5-fluorouracil including but not limited to erythema, scaling, itching, weeping, crusting, and pain.
Griseofulvin Pregnancy And Lactation Text: This medication is Pregnancy Category X and is known to cause serious birth defects. It is unknown if this medication is excreted in breast milk but breast feeding should be avoided.
Dutasteride Female Counseling: Dutasteride Counseling:  I discussed with the patient the risks of use of dutasteride including but not limited to decreased libido and sexual dysfunction. Explained the teratogenic nature of the medication and stressed the importance of not getting pregnant during treatment. All of the patient's questions and concerns were addressed.
Erythromycin Counseling:  I discussed with the patient the risks of erythromycin including but not limited to GI upset, allergic reaction, drug rash, diarrhea, increase in liver enzymes, and yeast infections.
Dupixent Counseling: I discussed with the patient the risks of dupilumab including but not limited to eye infection and irritation, cold sores, injection site reactions, worsening of asthma, allergic reactions and increased risk of parasitic infection.  Live vaccines should be avoided while taking dupilumab. Dupilumab will also interact with certain medications such as warfarin and cyclosporine. The patient understands that monitoring is required and they must alert us or the primary physician if symptoms of infection or other concerning signs are noted.
Skyrizi Counseling: I discussed with the patient the risks of risankizumab-rzaa including but not limited to immunosuppression, and serious infections.  The patient understands that monitoring is required including a PPD at baseline and must alert us or the primary physician if symptoms of infection or other concerning signs are noted.
Xolair Pregnancy And Lactation Text: This medication is Pregnancy Category B and is considered safe during pregnancy. This medication is excreted in breast milk.
Dapsone Pregnancy And Lactation Text: This medication is Pregnancy Category C and is not considered safe during pregnancy or breast feeding.
Rifampin Pregnancy And Lactation Text: This medication is Pregnancy Category C and it isn't know if it is safe during pregnancy. It is also excreted in breast milk and should not be used if you are breast feeding.
Rinvoq Pregnancy And Lactation Text: Based on animal studies, Rinvoq may cause embryo-fetal harm when administered to pregnant women.  The medication should not be used in pregnancy.  Breastfeeding is not recommended during treatment and for 6 days after the last dose.

## 2024-03-04 NOTE — PROCEDURE: COUNSELING
Detail Level: Detailed
Tetracycline Pregnancy And Lactation Text: This medication is Pregnancy Category D and not consider safe during pregnancy. It is also excreted in breast milk.
Erythromycin Counseling:  I discussed with the patient the risks of erythromycin including but not limited to GI upset, allergic reaction, drug rash, diarrhea, increase in liver enzymes, and yeast infections.
Topical Clindamycin Counseling: Patient counseled that this medication may cause skin irritation or allergic reactions.  In the event of skin irritation, the patient was advised to reduce the amount of the drug applied or use it less frequently.   The patient verbalized understanding of the proper use and possible adverse effects of clindamycin.  All of the patient's questions and concerns were addressed.
Spironolactone Pregnancy And Lactation Text: This medication can cause feminization of the male fetus and should be avoided during pregnancy. The active metabolite is also found in breast milk.
Doxycycline Counseling:  Patient counseled regarding possible photosensitivity and increased risk for sunburn.  Patient instructed to avoid sunlight, if possible.  When exposed to sunlight, patients should wear protective clothing, sunglasses, and sunscreen.  The patient was instructed to call the office immediately if the following severe adverse effects occur:  hearing changes, easy bruising/bleeding, severe headache, or vision changes.  The patient verbalized understanding of the proper use and possible adverse effects of doxycycline.  All of the patient's questions and concerns were addressed.
Azithromycin Pregnancy And Lactation Text: This medication is considered safe during pregnancy and is also secreted in breast milk.
High Dose Vitamin A Counseling: Side effects reviewed, pt to contact office should one occur.
Winlevi Counseling:  I discussed with the patient the risks of topical clascoterone including but not limited to erythema, scaling, itching, and stinging. Patient voiced their understanding.
Aklief Pregnancy And Lactation Text: It is unknown if this medication is safe to use during pregnancy.  It is unknown if this medication is excreted in breast milk.  Breastfeeding women should use the topical cream on the smallest area of the skin for the shortest time needed while breastfeeding.  Do not apply to nipple and areola.
Topical Sulfur Applications Counseling: Topical Sulfur Counseling: Patient counseled that this medication may cause skin irritation or allergic reactions.  In the event of skin irritation, the patient was advised to reduce the amount of the drug applied or use it less frequently.   The patient verbalized understanding of the proper use and possible adverse effects of topical sulfur application.  All of the patient's questions and concerns were addressed.
Isotretinoin Counseling: Patient should get monthly blood tests, not donate blood, not drive at night if vision affected, not share medication, and not undergo elective surgery for 6 months after tx completed. Side effects reviewed, pt to contact office should one occur.
Benzoyl Peroxide Counseling: Patient counseled that medicine may cause skin irritation and bleach clothing.  In the event of skin irritation, the patient was advised to reduce the amount of the drug applied or use it less frequently.   The patient verbalized understanding of the proper use and possible adverse effects of benzoyl peroxide.  All of the patient's questions and concerns were addressed.
High Dose Vitamin A Pregnancy And Lactation Text: High dose vitamin A therapy is contraindicated during pregnancy and breast feeding.
Azelaic Acid Counseling: Patient counseled that medicine may cause skin irritation and to avoid applying near the eyes.  In the event of skin irritation, the patient was advised to reduce the amount of the drug applied or use it less frequently.   The patient verbalized understanding of the proper use and possible adverse effects of azelaic acid.  All of the patient's questions and concerns were addressed.
Tazorac Counseling:  Patient advised that medication is irritating and drying.  Patient may need to apply sparingly and wash off after an hour before eventually leaving it on overnight.  The patient verbalized understanding of the proper use and possible adverse effects of tazorac.  All of the patient's questions and concerns were addressed.
Dapsone Counseling: I discussed with the patient the risks of dapsone including but not limited to hemolytic anemia, agranulocytosis, rashes, methemoglobinemia, kidney failure, peripheral neuropathy, headaches, GI upset, and liver toxicity.  Patients who start dapsone require monitoring including baseline LFTs and weekly CBCs for the first month, then every month thereafter.  The patient verbalized understanding of the proper use and possible adverse effects of dapsone.  All of the patient's questions and concerns were addressed.
Bactrim Counseling:  I discussed with the patient the risks of sulfa antibiotics including but not limited to GI upset, allergic reaction, drug rash, diarrhea, dizziness, photosensitivity, and yeast infections.  Rarely, more serious reactions can occur including but not limited to aplastic anemia, agranulocytosis, methemoglobinemia, blood dyscrasias, liver or kidney failure, lung infiltrates or desquamative/blistering drug rashes.
Topical Retinoid counseling:  Patient advised to apply a pea-sized amount only at bedtime and wait 30 minutes after washing their face before applying.  If too drying, patient may add a non-comedogenic moisturizer. The patient verbalized understanding of the proper use and possible adverse effects of retinoids.  All of the patient's questions and concerns were addressed.
Birth Control Pills Counseling: Birth Control Pill Counseling: I discussed with the patient the potential side effects of OCPs including but not limited to increased risk of stroke, heart attack, thrombophlebitis, deep venous thrombosis, hepatic adenomas, breast changes, GI upset, headaches, and depression.  The patient verbalized understanding of the proper use and possible adverse effects of OCPs. All of the patient's questions and concerns were addressed.
Topical Clindamycin Pregnancy And Lactation Text: This medication is Pregnancy Category B and is considered safe during pregnancy. It is unknown if it is excreted in breast milk.
Use Enhanced Medication Counseling?: No
Tetracycline Counseling: Patient counseled regarding possible photosensitivity and increased risk for sunburn.  Patient instructed to avoid sunlight, if possible.  When exposed to sunlight, patients should wear protective clothing, sunglasses, and sunscreen.  The patient was instructed to call the office immediately if the following severe adverse effects occur:  hearing changes, easy bruising/bleeding, severe headache, or vision changes.  The patient verbalized understanding of the proper use and possible adverse effects of tetracycline.  All of the patient's questions and concerns were addressed. Patient understands to avoid pregnancy while on therapy due to potential birth defects.
Azithromycin Counseling:  I discussed with the patient the risks of azithromycin including but not limited to GI upset, allergic reaction, drug rash, diarrhea, and yeast infections.
Isotretinoin Pregnancy And Lactation Text: This medication is Pregnancy Category X and is considered extremely dangerous during pregnancy. It is unknown if it is excreted in breast milk.
Winlevi Pregnancy And Lactation Text: This medication is considered safe during pregnancy and breastfeeding.
Aklief counseling:  Patient advised to apply a pea-sized amount only at bedtime and wait 30 minutes after washing their face before applying.  If too drying, patient may add a non-comedogenic moisturizer.  The most commonly reported side effects including irritation, redness, scaling, dryness, stinging, burning, itching, and increased risk of sunburn.  The patient verbalized understanding of the proper use and possible adverse effects of retinoids.  All of the patient's questions and concerns were addressed.
Erythromycin Pregnancy And Lactation Text: This medication is Pregnancy Category B and is considered safe during pregnancy. It is also excreted in breast milk.
Topical Sulfur Applications Pregnancy And Lactation Text: This medication is Pregnancy Category C and has an unknown safety profile during pregnancy. It is unknown if this topical medication is excreted in breast milk.
Doxycycline Pregnancy And Lactation Text: This medication is Pregnancy Category D and not consider safe during pregnancy. It is also excreted in breast milk but is considered safe for shorter treatment courses.
Benzoyl Peroxide Pregnancy And Lactation Text: This medication is Pregnancy Category C. It is unknown if benzoyl peroxide is excreted in breast milk.
Bactrim Pregnancy And Lactation Text: This medication is Pregnancy Category D and is known to cause fetal risk.  It is also excreted in breast milk.
Minocycline Counseling: Patient advised regarding possible photosensitivity and discoloration of the teeth, skin, lips, tongue and gums.  Patient instructed to avoid sunlight, if possible.  When exposed to sunlight, patients should wear protective clothing, sunglasses, and sunscreen.  The patient was instructed to call the office immediately if the following severe adverse effects occur:  hearing changes, easy bruising/bleeding, severe headache, or vision changes.  The patient verbalized understanding of the proper use and possible adverse effects of minocycline.  All of the patient's questions and concerns were addressed.
Azelaic Acid Pregnancy And Lactation Text: This medication is considered safe during pregnancy and breast feeding.
Tazorac Pregnancy And Lactation Text: This medication is not safe during pregnancy. It is unknown if this medication is excreted in breast milk.
Dapsone Pregnancy And Lactation Text: This medication is Pregnancy Category C and is not considered safe during pregnancy or breast feeding.
Spironolactone Counseling: Patient advised regarding risks of diarrhea, abdominal pain, hyperkalemia, birth defects (for female patients), liver toxicity and renal toxicity. The patient may need blood work to monitor liver and kidney function and potassium levels while on therapy. The patient verbalized understanding of the proper use and possible adverse effects of spironolactone.  All of the patient's questions and concerns were addressed.
Topical Retinoid Pregnancy And Lactation Text: This medication is Pregnancy Category C. It is unknown if this medication is excreted in breast milk.
Birth Control Pills Pregnancy And Lactation Text: This medication should be avoided if pregnant and for the first 30 days post-partum.
Sarecycline Counseling: Patient advised regarding possible photosensitivity and discoloration of the teeth, skin, lips, tongue and gums.  Patient instructed to avoid sunlight, if possible.  When exposed to sunlight, patients should wear protective clothing, sunglasses, and sunscreen.  The patient was instructed to call the office immediately if the following severe adverse effects occur:  hearing changes, easy bruising/bleeding, severe headache, or vision changes.  The patient verbalized understanding of the proper use and possible adverse effects of sarecycline.  All of the patient's questions and concerns were addressed.

## 2024-03-04 NOTE — PROCEDURE: ORDER TESTS
Expected Date Of Service: 03/04/2024
Lab Facility: [id number]
Billing Type: Third-Party Bill
Bill For Surgical Tray: no
Performing Laboratory: [id number]

## 2024-03-04 NOTE — PROCEDURE: BENIGN DESTRUCTION
Render Note In Bullet Format When Appropriate: No
Medical Necessity Clause: This procedure was medically necessary because the lesions that were treated were:
Treatment Number (Will Not Render If 0): 0
Detail Level: Detailed
Consent: The patient's consent was obtained including but not limited to risks of crusting, scabbing, blistering, scarring, darker or lighter pigmentary change, recurrence, incomplete removal and infection.
Anesthesia Volume In Cc: 0.5
Post-Care Instructions: I reviewed with the patient in detail post-care instructions. Patient is to wear sunprotection, and avoid picking at any of the treated lesions. Pt may apply Vaseline to crusted or scabbing areas.
Medical Necessity Information: It is in your best interest to select a reason for this procedure from the list below. All of these items fulfill various CMS LCD requirements except the new and changing color options.

## 2024-03-04 NOTE — PROCEDURE: PRESCRIPTION MEDICATION MANAGEMENT
Initiate Treatment: Rogaine 5% foam
Render In Strict Bullet Format?: No
Detail Level: Zone
Initiate Treatment: Retin-A 0.025 % topical cream: apply a pea size amount QOHS to acne of face until well tolerated and then use every night
Initiate Treatment: tacrolimus 0.1 % topical ointment: Apply a thin layer to face BID X 2 weeks until clear and then only use as needed.

## 2024-03-05 ENCOUNTER — OFFICE VISIT (OUTPATIENT)
Dept: INTERNAL MEDICINE | Facility: HOSPITAL | Age: 54
End: 2024-03-05
Attending: STUDENT IN AN ORGANIZED HEALTH CARE EDUCATION/TRAINING PROGRAM
Payer: COMMERCIAL

## 2024-03-05 VITALS
HEART RATE: 90 BPM | OXYGEN SATURATION: 97 % | DIASTOLIC BLOOD PRESSURE: 59 MMHG | WEIGHT: 146 LBS | BODY MASS INDEX: 20.9 KG/M2 | SYSTOLIC BLOOD PRESSURE: 123 MMHG | TEMPERATURE: 97.4 F | RESPIRATION RATE: 18 BRPM | HEIGHT: 70 IN

## 2024-03-05 DIAGNOSIS — Z13.220 SCREENING, LIPID: ICD-10-CM

## 2024-03-05 DIAGNOSIS — L65.8 FEMALE PATTERN HAIR LOSS: ICD-10-CM

## 2024-03-05 DIAGNOSIS — H93.11 TINNITUS OF RIGHT EAR: Primary | ICD-10-CM

## 2024-03-05 PROCEDURE — 99214 OFFICE O/P EST MOD 30 MIN: CPT | Performed by: STUDENT IN AN ORGANIZED HEALTH CARE EDUCATION/TRAINING PROGRAM

## 2024-03-05 PROCEDURE — 3008F BODY MASS INDEX DOCD: CPT | Performed by: STUDENT IN AN ORGANIZED HEALTH CARE EDUCATION/TRAINING PROGRAM

## 2024-03-05 NOTE — PATIENT INSTRUCTIONS
Get your mammogram done - call [phone] to schedule     Get blood work done at your convenience    Talk to your psychiatrist about the tinnitus and whether you can go down on the dose of wellbutrin    Keep your appointment with the ENT

## 2024-03-05 NOTE — PROGRESS NOTES
[facility]  Internal Medicine Progress Note       ASSESSMENT AND PLAN     Problem List Items Addressed This Visit        Dermatologic    Female pattern hair loss     This is likely normal hair loss in the setting of menopause. Will check a TSH for completeness and by patient request, normal when last checked in 2022.         Relevant Orders    TSH w reflex FT4       Ears/Nose/Throat    Tinnitus of right ear - Primary     There are no abnormalities on her ear exam that would explain her symptoms. I wonder if the development of tinnitus could be due to the higher dose of wellbutrin she is taking, though the time course does not align. I asked her to speak with her psychiatrist about this. She also has an appointment with ENT within the next month. I asked her to continue to avoid loud noises and to use a white noise machine/ambient noise if the tinnitus is particularly bothersome.        Other Visit Diagnoses     Screening, lipid        Relevant Orders    Lipid panel        Health care maintenance:     Health Maintenance Due   Topic Date Due   • Breast Cancer Screening  Never done   • Pneumococcal (1 of 2 - PCV) Never done   • Hepatitis B Vaccines (1 of 3 - 19+ 3-dose series) Never done   • Zoster Vaccine (1 of 2) Never done   • Influenza Vaccine (1) 08/01/2023   • COVID-19 Vaccine (3 - 2023-24 season) 09/01/2023       Shaneka Landry MD     SUBJECTIVE     Connie Rajanabraham is a 53 y.o. year-old female, primary patient of Dr. Parker with a past medical history of anxiety, ADHD, GERD, and ADAM who presents for a follow up visit.    Interval History:   -Here to discuss several issues  -First is tinnitus, which developed six days ago. Right ear feels worse than left ear. Improved when there is ambient noise, more noticeable when silent. No dizziness or vertigo symptoms.  -Slightly diminished hearing in the right ear, feels like she has been swimming. No pain.  -Went up on the dose of her  "wellbutrin 8 months ago, on 200 mg daily.    -Also has noticed increased hair loss - this is in the context of slowly downtitrating estradiol.    -She knows she needs to schedule her mammogram.    -Declines COVID vaccine.    The patient's past medical, family, and social history were reviewed and updated in the electronic medical record as appropriate.    A complete review of systems is otherwise negative, except as documented in the HPI.     PHYSICAL EXAMINATION     Visit Vitals  BP (!) 123/59 (BP Location: Left upper arm, Patient Position: Sitting)   Pulse 90   Temp 36.3 °C (97.4 °F) (Temporal)   Resp 18   Ht 1.778 m (5' 10\")   Wt 66.2 kg (146 lb)   SpO2 97%   BMI 20.95 kg/m²     Constitutional: Sitting upright in no acute distress.  Skin: No scalp lesions.  Ears: External ear and ear canal non-tender without swelling. Bilateral TMs clear, no erythema. Mild cerumen.  Cardiac: Heart rate and rhythm are normal.   Respiratory: Lung sounds clear in all lobes bilaterally.   Abdominal: Abdomen soft and non-tender without distension.  Extremities: Upper and lower extremities without swelling or erythema.   Neurological: Awake, alert, oriented to person, place and time with normal speech.   Psychiatric: Appropriate mood and affect.        LABS / IMAGING / STUDIES        Labs Reviewed:     Lab Results   Component Value Date    TSH 1.280 04/12/2022     MEDICATIONS      Current Outpatient Medications   Medication Instructions   • buPROPion (WELLBUTRIN) 200 mg, oral, Daily   • escitalopram (LEXAPRO) 5 mg, oral, 4 times weekly   • estradioL (ESTRACE) 0.5 mg, oral, 4 times weekly   • traZODone (DESYREL) 50 mg, oral, Nightly PRN          "

## 2024-03-06 PROBLEM — H93.11 TINNITUS OF RIGHT EAR: Status: ACTIVE | Noted: 2024-03-06

## 2024-03-06 PROBLEM — L65.8 FEMALE PATTERN HAIR LOSS: Status: ACTIVE | Noted: 2024-03-06

## 2024-03-06 NOTE — ASSESSMENT & PLAN NOTE
There are no abnormalities on her ear exam that would explain her symptoms. I wonder if the development of tinnitus could be due to the higher dose of wellbutrin she is taking, though the time course does not align. I asked her to speak with her psychiatrist about this. She also has an appointment with ENT within the next month. I asked her to continue to avoid loud noises and to use a white noise machine/ambient noise if the tinnitus is particularly bothersome.

## 2024-03-06 NOTE — ASSESSMENT & PLAN NOTE
This is likely normal hair loss in the setting of menopause. Will check a TSH for completeness and by patient request, normal when last checked in 2022.

## 2024-04-06 LAB
CHOLEST SERPL-MCNC: 218 MG/DL (ref 100–199)
HDLC SERPL-MCNC: 42 MG/DL
LDLC SERPL CALC-MCNC: 161 MG/DL (ref 0–99)
T4 FREE SERPL-MCNC: 1.03 NG/DL (ref 0.82–1.77)
TRIGL SERPL-MCNC: 85 MG/DL (ref 0–149)
TSH SERPL DL<=0.005 MIU/L-ACNC: 1.22 UIU/ML (ref 0.45–4.5)
VLDLC SERPL CALC-MCNC: 15 MG/DL (ref 5–40)

## 2024-04-17 ENCOUNTER — HOSPITAL ENCOUNTER (OUTPATIENT)
Dept: RADIOLOGY | Age: 54
Discharge: HOME | End: 2024-04-17
Payer: COMMERCIAL

## 2024-04-17 DIAGNOSIS — Z12.31 SCREENING MAMMOGRAM FOR BREAST CANCER: ICD-10-CM

## 2024-04-17 PROCEDURE — 77067 SCR MAMMO BI INCL CAD: CPT

## 2024-06-02 NOTE — ED ATTESTATION NOTE
The pt was seen and evaluated by the physician assistant.  We discussed the case and treatment plan.         Kerwin Lindsey DO  05/19/18 1821    
4 (moderate pain)

## 2024-06-06 ENCOUNTER — APPOINTMENT (RX ONLY)
Dept: URBAN - METROPOLITAN AREA CLINIC 28 | Facility: CLINIC | Age: 54
Setting detail: DERMATOLOGY
End: 2024-06-06

## 2024-06-06 DIAGNOSIS — L82.0 INFLAMED SEBORRHEIC KERATOSIS: ICD-10-CM

## 2024-06-06 DIAGNOSIS — L71.0 PERIORAL DERMATITIS: ICD-10-CM | Status: INADEQUATELY CONTROLLED

## 2024-06-06 PROCEDURE — 17110 DESTRUCTION B9 LES UP TO 14: CPT

## 2024-06-06 PROCEDURE — ? COUNSELING

## 2024-06-06 PROCEDURE — ? BENIGN DESTRUCTION

## 2024-06-06 PROCEDURE — 99214 OFFICE O/P EST MOD 30 MIN: CPT | Mod: 25

## 2024-06-06 PROCEDURE — ? PRESCRIPTION MEDICATION MANAGEMENT

## 2024-06-06 ASSESSMENT — LOCATION SIMPLE DESCRIPTION DERM
LOCATION SIMPLE: LEFT CHEEK
LOCATION SIMPLE: UPPER BACK
LOCATION SIMPLE: RIGHT UPPER BACK
LOCATION SIMPLE: LEFT LIP
LOCATION SIMPLE: RIGHT CHEEK
LOCATION SIMPLE: RIGHT LIP
LOCATION SIMPLE: LEFT UPPER BACK
LOCATION SIMPLE: NOSE

## 2024-06-06 ASSESSMENT — LOCATION DETAILED DESCRIPTION DERM
LOCATION DETAILED: INFERIOR THORACIC SPINE
LOCATION DETAILED: LEFT MEDIAL UPPER BACK
LOCATION DETAILED: LEFT INFERIOR UPPER BACK
LOCATION DETAILED: RIGHT UPPER CUTANEOUS LIP
LOCATION DETAILED: LEFT MEDIAL BUCCAL CHEEK
LOCATION DETAILED: LEFT UPPER CUTANEOUS LIP
LOCATION DETAILED: NASAL SUPRATIP
LOCATION DETAILED: RIGHT MEDIAL BUCCAL CHEEK
LOCATION DETAILED: RIGHT MEDIAL UPPER BACK

## 2024-06-06 ASSESSMENT — LOCATION ZONE DERM
LOCATION ZONE: TRUNK
LOCATION ZONE: NOSE
LOCATION ZONE: LIP
LOCATION ZONE: FACE

## 2024-06-06 ASSESSMENT — INVESTIGATOR STATIC GLOBAL ASSESSMENT: IN YOUR EXPERIENCE, AMONG ALL PATIENTS YOU HAVE SEEN WITH THIS CONDITION, HOW SEVERE IS THIS PATIENT'S CONDITION?: MILD

## 2024-06-06 NOTE — PROCEDURE: BENIGN DESTRUCTION
Medical Necessity Clause: This procedure was medically necessary because the lesions that were treated were:
Include Z78.9 (Other Specified Conditions Influencing Health Status) As An Associated Diagnosis?: No
Treatment Number (Will Not Render If 0): 1
Post-Care Instructions: I reviewed with the patient in detail post-care instructions. Patient is to wear sunprotection, and avoid picking at any of the treated lesions. Pt may apply Vaseline to crusted or scabbing areas.
Detail Level: Simple
Medical Necessity Information: It is in your best interest to select a reason for this procedure from the list below. All of these items fulfill various CMS LCD requirements except the new and changing color options.
Consent: The patient's consent was obtained including but not limited to risks of crusting, scabbing, blistering, scarring, darker or lighter pigmentary change, recurrence, incomplete removal and infection.

## 2024-06-06 NOTE — PROCEDURE: PRESCRIPTION MEDICATION MANAGEMENT
Render In Strict Bullet Format?: No
Continue Regimen: Tacrolimus 0.1% ointment- apply a thin layer QDAY to AA of face until clear then use PRN flare.
Plan: Continue tacrolimus - pt to consider adding oral minocycyline, she declines for now
Detail Level: Simple

## 2024-09-09 ENCOUNTER — APPOINTMENT (RX ONLY)
Dept: URBAN - METROPOLITAN AREA CLINIC 28 | Facility: CLINIC | Age: 54
Setting detail: DERMATOLOGY
End: 2024-09-09

## 2024-09-09 DIAGNOSIS — L71.0 PERIORAL DERMATITIS: ICD-10-CM | Status: INADEQUATELY CONTROLLED

## 2024-09-09 DIAGNOSIS — D18.0 HEMANGIOMA: ICD-10-CM

## 2024-09-09 DIAGNOSIS — Z85.828 PERSONAL HISTORY OF OTHER MALIGNANT NEOPLASM OF SKIN: ICD-10-CM

## 2024-09-09 DIAGNOSIS — L57.0 ACTINIC KERATOSIS: ICD-10-CM

## 2024-09-09 DIAGNOSIS — Z85.820 PERSONAL HISTORY OF MALIGNANT MELANOMA OF SKIN: ICD-10-CM

## 2024-09-09 DIAGNOSIS — L81.4 OTHER MELANIN HYPERPIGMENTATION: ICD-10-CM

## 2024-09-09 DIAGNOSIS — D22 MELANOCYTIC NEVI: ICD-10-CM

## 2024-09-09 DIAGNOSIS — L82.1 OTHER SEBORRHEIC KERATOSIS: ICD-10-CM

## 2024-09-09 PROBLEM — D18.01 HEMANGIOMA OF SKIN AND SUBCUTANEOUS TISSUE: Status: ACTIVE | Noted: 2024-09-09

## 2024-09-09 PROBLEM — D22.5 MELANOCYTIC NEVI OF TRUNK: Status: ACTIVE | Noted: 2024-09-09

## 2024-09-09 PROCEDURE — ? PRESCRIPTION MEDICATION MANAGEMENT

## 2024-09-09 PROCEDURE — ? PRESCRIPTION

## 2024-09-09 PROCEDURE — ? LIQUID NITROGEN

## 2024-09-09 PROCEDURE — ? TREATMENT REGIMEN

## 2024-09-09 PROCEDURE — 17003 DESTRUCT PREMALG LES 2-14: CPT

## 2024-09-09 PROCEDURE — ? SUNSCREEN RECOMMENDATIONS

## 2024-09-09 PROCEDURE — ? COUNSELING

## 2024-09-09 PROCEDURE — 99214 OFFICE O/P EST MOD 30 MIN: CPT | Mod: 25

## 2024-09-09 PROCEDURE — ? FULL BODY SKIN EXAM

## 2024-09-09 PROCEDURE — 17000 DESTRUCT PREMALG LESION: CPT

## 2024-09-09 RX ORDER — PIMECROLIMUS 10 MG/G
CREAM TOPICAL
Qty: 30 | Refills: 3 | Status: ERX | COMMUNITY
Start: 2024-09-09

## 2024-09-09 RX ORDER — MINOCYCLINE HYDROCHLORIDE 100 MG/1
CAPSULE ORAL
Qty: 60 | Refills: 1 | Status: ERX | COMMUNITY
Start: 2024-09-09

## 2024-09-09 RX ORDER — FLUCONAZOLE 150 MG/1
TABLET ORAL
Qty: 2 | Refills: 0 | Status: ERX | COMMUNITY
Start: 2024-09-09

## 2024-09-09 RX ADMIN — FLUCONAZOLE: 150 TABLET ORAL at 00:00

## 2024-09-09 RX ADMIN — PIMECROLIMUS: 10 CREAM TOPICAL at 00:00

## 2024-09-09 RX ADMIN — MINOCYCLINE HYDROCHLORIDE: 100 CAPSULE ORAL at 00:00

## 2024-09-09 ASSESSMENT — LOCATION DETAILED DESCRIPTION DERM
LOCATION DETAILED: LEFT CHIN
LOCATION DETAILED: LEFT UPPER CUTANEOUS LIP
LOCATION DETAILED: RIGHT UPPER CUTANEOUS LIP
LOCATION DETAILED: RIGHT ANTERIOR SHOULDER
LOCATION DETAILED: RIGHT MEDIAL BUCCAL CHEEK
LOCATION DETAILED: LEFT MEDIAL BUCCAL CHEEK
LOCATION DETAILED: SUPERIOR THORACIC SPINE

## 2024-09-09 ASSESSMENT — LOCATION ZONE DERM
LOCATION ZONE: TRUNK
LOCATION ZONE: ARM
LOCATION ZONE: LIP
LOCATION ZONE: FACE

## 2024-09-09 ASSESSMENT — LOCATION SIMPLE DESCRIPTION DERM
LOCATION SIMPLE: RIGHT SHOULDER
LOCATION SIMPLE: UPPER BACK
LOCATION SIMPLE: RIGHT LIP
LOCATION SIMPLE: CHIN
LOCATION SIMPLE: RIGHT CHEEK
LOCATION SIMPLE: LEFT CHEEK
LOCATION SIMPLE: LEFT LIP

## 2024-09-09 NOTE — HPI: EVALUATION OF SKIN LESION(S)
What Type Of Note Output Would You Prefer (Optional)?: Bullet Format
Hpi Title: Evaluation of Skin Lesions
How Severe Are Your Spot(S)?: mild
Location: R earlobe
Year Removed: 2022

## 2024-09-09 NOTE — PROCEDURE: PRESCRIPTION MEDICATION MANAGEMENT
Render In Strict Bullet Format?: No
Discontinue Regimen: Tacrolimus 0.1% ointment- apply a thin layer QDAY to AA of face until clear then use PRN flare
Plan: Adding minocycline for a month; fluconazole available in case of yeast infection (pt notes she is very prone). T/c ivermectin as future option.  Pt has been extensively reading Reddit forums on perioral dermatitis and is familiar with many treatments and theories about POD.
Initiate Treatment: minocycline 100 mg capsule: Take one tab po bid\\nfluconazole 150 mg tablet: Take 1 tab when noticing symptoms of yeast infection\\nElidel 1 % topical cream: apply a thin layer QDAY to AA of face until clear then use PRN flare
Detail Level: Simple

## 2024-09-09 NOTE — PROCEDURE: SUNSCREEN RECOMMENDATIONS
General Sunscreen Counseling: The nature of sun-induced photo-aging and skin cancers is discussed. Sun avoidance, protective clothing, and the use of 30-SPF sunscreens are advised. Observe closely for skin damage/changes, and call if such occur.\\n\\nAdvised to use daily sunscreen SPF 30 with UVB and UVA protection daily. Apply at least 10 minutes prior to going outside, and reapply every 2 hours outside. Recommended sunblocks with zinc oxide or titanium dioxide (though these tend not to rub in as well).
Detail Level: Simple
Products Recommended: Recommended using over-the-counter sunscreen SPF 30 or greater, with UVB and UVA protection.\\n\\nAccording to Consumer Reports:\\n\\nThree sunscreens were given the Consumer Reports \"Best Buy\" rating:\\n\\nUp & Up Sport SPF 30\\nNo-Ad with Aloe and Vitamin E SPF 45\\nEquate Baby SPF 50\\n\\nSix others were recommended:\\nBanana Boat Sport Performance SPF 30\\nCoppertone Sport Ultra Sweatproof SPF 30\\nCVS Fast Cover Sport SPF 30\\nWalgreens Sport SPF 50\\nOcean Potion Kids Instant Dry Mist SPF 50\\nBanana Boat Sport Performance

## 2024-09-09 NOTE — PROCEDURE: LIQUID NITROGEN
Detail Level: Detailed
Render Post-Care Instructions In Note?: no
Number Of Freeze-Thaw Cycles: 2 freeze-thaw cycles
Post-Care Instructions: I reviewed with the patient in detail post-care instructions. Patient is to wear sunprotection, and avoid picking at any of the treated lesions. Pt may apply Vaseline to crusted or scabbing areas.
Duration Of Freeze Thaw-Cycle (Seconds): 5
Application Tool (Optional): Liquid Nitrogen Sprayer
Show Applicator Variable?: Yes
Consent: The patient's consent was obtained including but not limited to risks of crusting, scabbing, blistering, scarring, darker or lighter pigmentary change, recurrence, incomplete removal and infection.

## 2024-10-02 ENCOUNTER — HOSPITAL ENCOUNTER (EMERGENCY)
Facility: HOSPITAL | Age: 54
Discharge: HOME | End: 2024-10-02
Attending: STUDENT IN AN ORGANIZED HEALTH CARE EDUCATION/TRAINING PROGRAM | Admitting: STUDENT IN AN ORGANIZED HEALTH CARE EDUCATION/TRAINING PROGRAM
Payer: COMMERCIAL

## 2024-10-02 ENCOUNTER — APPOINTMENT (EMERGENCY)
Dept: RADIOLOGY | Facility: HOSPITAL | Age: 54
End: 2024-10-02
Payer: COMMERCIAL

## 2024-10-02 VITALS
OXYGEN SATURATION: 99 % | DIASTOLIC BLOOD PRESSURE: 71 MMHG | SYSTOLIC BLOOD PRESSURE: 125 MMHG | HEIGHT: 70 IN | RESPIRATION RATE: 18 BRPM | HEART RATE: 92 BPM | TEMPERATURE: 98.1 F | WEIGHT: 145 LBS | BODY MASS INDEX: 20.76 KG/M2

## 2024-10-02 DIAGNOSIS — R07.9 CHEST PAIN, UNSPECIFIED TYPE: Primary | ICD-10-CM

## 2024-10-02 LAB
ALBUMIN SERPL-MCNC: 4 G/DL (ref 3.5–5.7)
ALP SERPL-CCNC: 81 IU/L (ref 34–125)
ALT SERPL-CCNC: 11 IU/L (ref 7–52)
ANION GAP SERPL CALC-SCNC: 4 MEQ/L (ref 3–15)
AST SERPL-CCNC: 14 IU/L (ref 13–39)
BASOPHILS # BLD: 0.06 K/UL (ref 0.01–0.1)
BASOPHILS NFR BLD: 0.9 %
BILIRUB SERPL-MCNC: 0.3 MG/DL (ref 0.3–1.2)
BUN SERPL-MCNC: 16 MG/DL (ref 7–25)
CALCIUM SERPL-MCNC: 9.3 MG/DL (ref 8.6–10.3)
CHLORIDE SERPL-SCNC: 103 MEQ/L (ref 98–107)
CO2 SERPL-SCNC: 29 MEQ/L (ref 21–31)
CREAT SERPL-MCNC: 1 MG/DL (ref 0.6–1.2)
D DIMER PPP IA.FEU-MCNC: <0.27 UG/ML FEU (ref 0–0.5)
DIFFERENTIAL METHOD BLD: ABNORMAL
EGFRCR SERPLBLD CKD-EPI 2021: >60 ML/MIN/1.73M*2
EOSINOPHIL # BLD: 0.22 K/UL (ref 0.04–0.36)
EOSINOPHIL NFR BLD: 3.2 %
ERYTHROCYTE [DISTWIDTH] IN BLOOD BY AUTOMATED COUNT: 13.6 % (ref 11.7–14.4)
GLUCOSE SERPL-MCNC: 125 MG/DL (ref 70–99)
HCT VFR BLD AUTO: 40 % (ref 35–45)
HGB BLD-MCNC: 12.5 G/DL (ref 11.8–15.7)
IMM GRANULOCYTES # BLD AUTO: 0.01 K/UL (ref 0–0.08)
IMM GRANULOCYTES NFR BLD AUTO: 0.1 %
LIPASE SERPL-CCNC: 27 U/L (ref 11–82)
LYMPHOCYTES # BLD: 2.32 K/UL (ref 1.2–3.5)
LYMPHOCYTES NFR BLD: 33.7 %
MCH RBC QN AUTO: 27.2 PG (ref 28–33.2)
MCHC RBC AUTO-ENTMCNC: 31.3 G/DL (ref 32.2–35.5)
MCV RBC AUTO: 87 FL (ref 83–98)
MONOCYTES # BLD: 0.34 K/UL (ref 0.28–0.8)
MONOCYTES NFR BLD: 4.9 %
NEUTROPHILS # BLD: 3.94 K/UL (ref 1.7–7)
NEUTS SEG NFR BLD: 57.2 %
NRBC BLD-RTO: 0 %
PLATELET # BLD AUTO: 277 K/UL (ref 150–369)
PMV BLD AUTO: 10.6 FL (ref 9.4–12.3)
POTASSIUM SERPL-SCNC: 4.2 MEQ/L (ref 3.5–5.1)
PROT SERPL-MCNC: 7 G/DL (ref 6–8.2)
RBC # BLD AUTO: 4.6 M/UL (ref 3.93–5.22)
SODIUM SERPL-SCNC: 136 MEQ/L (ref 136–145)
TROPONIN I SERPL HS-MCNC: 2.6 PG/ML
WBC # BLD AUTO: 6.89 K/UL (ref 3.8–10.5)

## 2024-10-02 PROCEDURE — 3E033GC INTRODUCTION OF OTHER THERAPEUTIC SUBSTANCE INTO PERIPHERAL VEIN, PERCUTANEOUS APPROACH: ICD-10-PCS | Performed by: STUDENT IN AN ORGANIZED HEALTH CARE EDUCATION/TRAINING PROGRAM

## 2024-10-02 PROCEDURE — 99284 EMERGENCY DEPT VISIT MOD MDM: CPT | Mod: U5,25

## 2024-10-02 PROCEDURE — 63600000 HC DRUGS/DETAIL CODE: Mod: JZ | Performed by: PHYSICIAN ASSISTANT

## 2024-10-02 PROCEDURE — 71046 X-RAY EXAM CHEST 2 VIEWS: CPT

## 2024-10-02 PROCEDURE — 80053 COMPREHEN METABOLIC PANEL: CPT | Performed by: STUDENT IN AN ORGANIZED HEALTH CARE EDUCATION/TRAINING PROGRAM

## 2024-10-02 PROCEDURE — 85025 COMPLETE CBC W/AUTO DIFF WBC: CPT | Performed by: STUDENT IN AN ORGANIZED HEALTH CARE EDUCATION/TRAINING PROGRAM

## 2024-10-02 PROCEDURE — 85379 FIBRIN DEGRADATION QUANT: CPT | Performed by: PHYSICIAN ASSISTANT

## 2024-10-02 PROCEDURE — 36415 COLL VENOUS BLD VENIPUNCTURE: CPT | Performed by: STUDENT IN AN ORGANIZED HEALTH CARE EDUCATION/TRAINING PROGRAM

## 2024-10-02 PROCEDURE — 96374 THER/PROPH/DIAG INJ IV PUSH: CPT

## 2024-10-02 PROCEDURE — 83690 ASSAY OF LIPASE: CPT | Performed by: PHYSICIAN ASSISTANT

## 2024-10-02 PROCEDURE — 93005 ELECTROCARDIOGRAM TRACING: CPT

## 2024-10-02 PROCEDURE — 84484 ASSAY OF TROPONIN QUANT: CPT | Performed by: STUDENT IN AN ORGANIZED HEALTH CARE EDUCATION/TRAINING PROGRAM

## 2024-10-02 PROCEDURE — 93005 ELECTROCARDIOGRAM TRACING: CPT | Performed by: STUDENT IN AN ORGANIZED HEALTH CARE EDUCATION/TRAINING PROGRAM

## 2024-10-02 RX ORDER — PANTOPRAZOLE SODIUM 40 MG/10ML
40 INJECTION, POWDER, LYOPHILIZED, FOR SOLUTION INTRAVENOUS ONCE
Status: COMPLETED | OUTPATIENT
Start: 2024-10-02 | End: 2024-10-02

## 2024-10-02 RX ORDER — KETOROLAC TROMETHAMINE 15 MG/ML
15 INJECTION, SOLUTION INTRAMUSCULAR; INTRAVENOUS ONCE
Status: DISCONTINUED | OUTPATIENT
Start: 2024-10-02 | End: 2024-10-02

## 2024-10-02 RX ORDER — PANTOPRAZOLE SODIUM 40 MG/1
40 TABLET, DELAYED RELEASE ORAL
Qty: 30 TABLET | Refills: 0 | Status: SHIPPED | OUTPATIENT
Start: 2024-10-02 | End: 2024-10-21 | Stop reason: SDUPTHER

## 2024-10-02 RX ADMIN — PANTOPRAZOLE SODIUM 40 MG: 40 INJECTION, POWDER, LYOPHILIZED, FOR SOLUTION INTRAVENOUS at 17:23

## 2024-10-02 ASSESSMENT — ENCOUNTER SYMPTOMS
NAUSEA: 0
NECK PAIN: 0
SHORTNESS OF BREATH: 0
CHILLS: 0
NECK STIFFNESS: 0
ABDOMINAL PAIN: 0
VOMITING: 0
BACK PAIN: 0
LIGHT-HEADEDNESS: 0
FEVER: 0

## 2024-10-02 NOTE — ED ATTESTATION NOTE
"Physician Attestation:   I have personally seen and examined Connie Chi, participated in the management, and agree with the findings in the above note except as where stated.     I have personally performed the key components of the encounter and provided a substantive portion of the care and medical decision making.  The Physician Assistant (TACOS) and I discussed  the case, workup, and disposition.    Chief Complaint  Chief Complaint   Patient presents with    Palpitations       My focused history, examination, assessment and plan of care of Connie Chi is as follows:  Patient Vitals for the past 72 hrs:   BP Temp Temp src Pulse Resp SpO2 Height Weight   10/02/24 1649 125/71 36.7 °C (98.1 °F) Tympanic 92 18 99 % 1.778 m (5' 10\") 65.8 kg (145 lb)     VS reviewed, NAD, nontoxic, head at/nc, normal speech, no resp distress, normal skin tone, coordinated movement. Lungs ctab. Cardiac rrr without m/r/g.     MDM / Plan  -Patient historian/independent historians: patient  -Prior notes/radiology reviewed: none  -Plan: Pt with reported palpitations and discomfort to left lateral chest into mid sternal area. Component of GI issue; states h/o similar and recently started herself on famotidine. Also notes that she is taking Wellbutrin but weaning herself off of Lexapro and possibly an anxiety component. Labs ECG and CXR performed; no acute findings noted. VSS. Reassured. Stable for dc home. Encouraged f/u with PCP and provided contact for cardiology for further evaluation as deemed necessary.   -The patient's chief complaint is an acute problem.    *Refer to ED Workup tab and PAJoshuaC chart for further documentation.       Geoff Krishnan DO  10/02/24 0516    "

## 2024-10-02 NOTE — ED PROVIDER NOTES
Emergency Medicine Note  HPI   HISTORY OF PRESENT ILLNESS     Patient reports recently started minocycline for her dermatitis by her dermatologist, and over the last week she has noticed pain left-sided chest ribs into her back.  It is worse with deep breath twisting movement.  She states she has been belching more does not feel it is completely GERD but that may represent some of the symptoms.  Denies any shortness of breath fever chills.  She states symptoms have been waxing waning.  She did go to urgent care earlier in the week and her COVID was negative.  And given persistent recurrent mild to moderate symptoms of left-sided chest discomfort came to the ER today for further evaluation treatment.  Patient has any shortness of breath.  She denies any calf pain or swelling.  Denies any travel greater than 3 hours in the last month.    Father history of CAD no family history of PEs or DVTs.          Patient History   PAST HISTORY     Reviewed from Nursing Triage:       Past Medical History:   Diagnosis Date    ADHD     Anxiety     Bowel disease, inflammatory     Endometriosis     Gastroenteritis     GERD (gastroesophageal reflux disease)        Past Surgical History   Procedure Laterality Date    FLEXIBLE COLONOSCOPY PROXIMAL TO SPLENIC FLEXURE WITH BIOPSY N/A 6/20/2018    Performed by Gerson Bocanegra MD at [facility]    Hysterectomy  2008    IZZY 2/2 endometriosis    UPPER GASTROINTESTINAL ENDOSCOPY WITH BIOPSY N/A 6/20/2018    Performed by Gerson Bocanegra MD at [facility]    [place] tooth extraction         Family History   Problem Relation Name Age of Onset    Heart disease Biological Father      Lumbar disc disease Biological Father      Dementia Biological Mother         Social History     Tobacco Use    Smoking status: Every Day     Current packs/day: 0.50     Average packs/day: 0.5 packs/day for 30.0 years (15.0 ttl pk-yrs)     Types: Cigarettes     Passive exposure: Never    Smokeless tobacco: Never    Vaping Use    Vaping status: Never Used   Substance Use Topics    Alcohol use: No    Drug use: Yes     Types: Marijuana         Review of Systems   REVIEW OF SYSTEMS     Review of Systems   Constitutional:  Negative for chills and fever.   Respiratory:  Negative for shortness of breath.    Cardiovascular:  Positive for chest pain.   Gastrointestinal:  Negative for abdominal pain, nausea and vomiting.   Musculoskeletal:  Negative for back pain, neck pain and neck stiffness.   Neurological:  Negative for syncope and light-headedness.         VITALS     ED Vitals      Date/Time Temp Pulse Resp BP SpO2 [facility]   10/02/24 1649 36.7 °C (98.1 °F) 92 18 125/71 99 % CMS                         Physical Exam   PHYSICAL EXAM     Physical Exam  Vitals and nursing note reviewed.   Constitutional:       Appearance: Normal appearance.   HENT:      Head: Normocephalic and atraumatic.   Eyes:      Conjunctiva/sclera: Conjunctivae normal.   Cardiovascular:      Rate and Rhythm: Normal rate and regular rhythm.   Pulmonary:      Effort: Pulmonary effort is normal.      Breath sounds: Normal breath sounds. No wheezing or rales.   Abdominal:      General: Bowel sounds are normal.      Palpations: Abdomen is soft.      Tenderness: There is no abdominal tenderness. There is no guarding.   Musculoskeletal:      Right lower leg: No edema.      Left lower leg: No edema.   Neurological:      General: No focal deficit present.      Mental Status: She is alert.   Psychiatric:         Mood and Affect: Mood normal.           PROCEDURES     Procedures     DATA     Results       Procedure Component Value Units Date/Time    HS Troponin (with 2 hour reflex) [[id number]]  (Normal) Collected: 10/02/24 1659    Specimen: Blood, Venous Updated: 10/02/24 1801     High Sens Troponin I 2.6 pg/mL     D-dimer, quantitative [[id number]]  (Normal) Collected: 10/02/24 1723    Specimen: Blood, Venous Updated: 10/02/24 1757     D-Dimer, Quant <0.27 ug/mL FEU       Comment: Suggested Age Related Reference Range: 0.00 - 0.53  The D-Dimer assay can be used as an aid in the diagnosis of DVT or PE. The test can not be used by itself to exclude DVT or PE. When used as a diagnostic aid, the cutoff value is the same as the reference range: <0.5 ug/ml FEU.       Comprehensive metabolic panel [[id number]]  (Abnormal) Collected: 10/02/24 1659    Specimen: Blood, Venous Updated: 10/02/24 1756     Sodium 136 mEQ/L      Potassium 4.2 mEQ/L      Comment: Results obtained on plasma. Plasma Potassium values may be up to 0.4 mEQ/L less than serum values. The differences may be greater for patients with high platelet or white cell counts.        Chloride 103 mEQ/L      CO2 29 mEQ/L      BUN 16 mg/dL      Creatinine 1.0 mg/dL      Glucose 125 mg/dL      Calcium 9.3 mg/dL      AST (SGOT) 14 IU/L      ALT (SGPT) 11 IU/L      Alkaline Phosphatase 81 IU/L      Total Protein 7.0 g/dL      Comment: Test performed on plasma which typically contains approximately 0.4 g/dL more protein than serum.        Albumin 4.0 g/dL      Bilirubin, Total 0.3 mg/dL      eGFR >60.0 mL/min/1.73m*2      Comment: Calculation based on the Chronic Kidney Disease Epidemiology Collaboration (CKD-EPI) equation refit without adjustment for race.        Anion Gap 4 mEQ/L     Lipase [[id number]]  (Normal) Collected: 10/02/24 1659    Specimen: Blood, Venous Updated: 10/02/24 1756     Lipase 27 U/L     CBC and differential [[id number]]  (Abnormal) Collected: 10/02/24 1659    Specimen: Blood, Venous Updated: 10/02/24 1726     WBC 6.89 K/uL      RBC 4.60 M/uL      Hemoglobin 12.5 g/dL      Hematocrit 40.0 %      MCV 87.0 fL      MCH 27.2 pg      MCHC 31.3 g/dL      RDW 13.6 %      Platelets 277 K/uL      MPV 10.6 fL      Differential Type Auto     nRBC 0.0 %      Immature Granulocytes 0.1 %      Neutrophils 57.2 %      Lymphocytes 33.7 %      Monocytes 4.9 %      Eosinophils 3.2 %      Basophils 0.9 %      Immature Granulocytes,  Absolute 0.01 K/uL      Neutrophils, Absolute 3.94 K/uL      Lymphocytes, Absolute 2.32 K/uL      Monocytes, Absolute 0.34 K/uL      Eosinophils, Absolute 0.22 K/uL      Basophils, Absolute 0.06 K/uL             Imaging Results              X-RAY CHEST 2 VIEWS (Preliminary result)  Result time 10/02/24 18:08:11      Preliminary Interpretation    NAD NS/JR                                    ECG 12 lead          Scoring tools                                  ED Course & MDM   MDM / ED COURSE / CLINICAL IMPRESSION / DISPO     Medical Decision Making  Problems Addressed:  Chest pain, unspecified type: acute illness or injury    Amount and/or Complexity of Data Reviewed  Labs: ordered. Decision-making details documented in ED Course.  Radiology: ordered and independent interpretation performed.  ECG/medicine tests: ordered.    Risk  Prescription drug management.        ED Course as of 10/02/24 1834   Wed Oct 02, 2024   1722 Discussed with TACOS. Agree with plan.    [NS]   1805 D-Dimer, Quant: <0.27 [JR]   1805 High Sens Troponin I: 2.6 [JR]   1826 Updated patient regarding workup and plan outpatient follow-up with her doctor at cardiology.  Will start Protonix p.o.  Discussed with her minocycline which her dermatologist told her to stop.  Strict return instructions given patient verbalized understanding [JR]      ED Course User Index  [JR] Minor Han PA C  [NS] Geoff Krishnan, DO     Clinical Impression      Chest pain, unspecified type     _________________       ED Disposition   Discharge                       Minor Han PA C  10/02/24 1834

## 2024-10-02 NOTE — DISCHARGE INSTRUCTIONS
Please start Protonix as prescribed.    Please stop follow-up with your doctor for further evaluation of your pain    Please take minocycline with full glass of water.

## 2024-10-03 LAB
ATRIAL RATE: 74
P AXIS: 39
PR INTERVAL: 166
QRS DURATION: 82
QT INTERVAL: 382
QTC CALCULATION(BAZETT): 424
R AXIS: -9
T WAVE AXIS: 28
VENTRICULAR RATE: 74

## 2024-10-17 ENCOUNTER — APPOINTMENT (RX ONLY)
Dept: URBAN - METROPOLITAN AREA CLINIC 28 | Facility: CLINIC | Age: 54
Setting detail: DERMATOLOGY
End: 2024-10-17

## 2024-10-17 DIAGNOSIS — L71.0 PERIORAL DERMATITIS: ICD-10-CM

## 2024-10-17 PROBLEM — R07.1 CHEST PAIN ON BREATHING: Status: ACTIVE | Noted: 2024-10-17

## 2024-10-17 PROCEDURE — ? COUNSELING

## 2024-10-17 PROCEDURE — 99214 OFFICE O/P EST MOD 30 MIN: CPT

## 2024-10-17 PROCEDURE — ? PRESCRIPTION MEDICATION MANAGEMENT

## 2024-10-17 PROCEDURE — ? PRESCRIPTION

## 2024-10-17 RX ORDER — TACROLIMUS 1 MG/G
OINTMENT TOPICAL
Qty: 100 | Refills: 3 | Status: ERX

## 2024-10-17 RX ORDER — CLINDAMYCIN PHOSPHATE 10 MG/ML
LOTION TOPICAL
Qty: 60 | Refills: 6 | Status: ERX | COMMUNITY
Start: 2024-10-17

## 2024-10-17 RX ADMIN — CLINDAMYCIN PHOSPHATE: 10 LOTION TOPICAL at 00:00

## 2024-10-17 ASSESSMENT — LOCATION SIMPLE DESCRIPTION DERM
LOCATION SIMPLE: LEFT CHEEK
LOCATION SIMPLE: LEFT LIP
LOCATION SIMPLE: RIGHT LIP
LOCATION SIMPLE: RIGHT CHEEK

## 2024-10-17 ASSESSMENT — LOCATION DETAILED DESCRIPTION DERM
LOCATION DETAILED: LEFT UPPER CUTANEOUS LIP
LOCATION DETAILED: RIGHT MEDIAL BUCCAL CHEEK
LOCATION DETAILED: RIGHT UPPER CUTANEOUS LIP
LOCATION DETAILED: LEFT MEDIAL BUCCAL CHEEK

## 2024-10-17 ASSESSMENT — LOCATION ZONE DERM
LOCATION ZONE: LIP
LOCATION ZONE: FACE

## 2024-10-18 NOTE — ASSESSMENT & PLAN NOTE
Recent ED visit for chest pain that was exacerbated by deep inspiration and motion of the trunk  ECG showed normal sinus rhythm  D-dimer negative, troponin 2.6  Recently started on minocycline, was advised to stop  Discharge with a prescription for Protonix

## 2024-10-18 NOTE — ASSESSMENT & PLAN NOTE
Re-started on pantoprazole 40 mg daily  Taking famotidine OTC as needed, only taking on a weekly basis   Has followed with [facility], but has not seen in years     - Continue PPI 40mg daily   - GI referral   Orders:    Ambulatory referral to [facility]; Future

## 2024-10-18 NOTE — PROGRESS NOTES
[facility]  Internal Medicine Progress Note         Assessment & Plan  GERD without esophagitis  Re-started on pantoprazole 40 mg daily  Taking famotidine OTC as needed, only taking on a weekly basis   Has followed with [facility], but has not seen in years     - Continue PPI 40mg daily   - GI referral   Orders:    Ambulatory referral to [facility]; Future    Chest pain on breathing  Recent ED visit for chest pain that was exacerbated by deep inspiration and motion of the trunk  ECG showed normal sinus rhythm  D-dimer negative, troponin 2.6  Recently started on minocycline, was advised to stop  Discharge with a prescription for Protonix         Dyslipidemia  Total cholesterol 218 from 4/2024  Family history of MI in father in early 60s     - Start atorvastatin 40mg today        Tobacco use  Smokes 8 cigarettes per day, but has been cutting back  Wants to quit    - Nicotine replacement patch          Health care maintenance: Preferrs Novovax Covid vaccine, declines flu    No follow-ups on file.  At next visit: A1c, lipid panel; singles vaccine       Nguyễn Mcclelland MD        PING Chi is a 53 y.o. year-old female, primary patient of Dr. Barrett with a past medical history of ADHD, GERD, and ADAM  who presents for ED follow up.     Interval History:     Patient was seen in the emergency department on 10/2 for left-sided chest pain.  Pain was worse with deep inspiration and motion of the trunk.  Of note, she was started on minocycline by her dermatologist for dermatitis and took it in the evening before bed.  She denied shortness of breath, fever, chills.  D-dimer was negative, troponin 2.6.  She was started on a course of Protonix.  Emergency department spoke to her dermatologist who advised her to discontinue minocycline.    Patient still notes occasional abdominal pain, but has had significant improvement. She is still taking Protonix 40 mg once per day and  occasionally used OTC Famotidine. She has made changes to her diet to improve her symptoms by cutting out spicy foods, carbonated beverages. She endorses belching, acidic taste in the mouth, and a raspy voice.     She denies left sided chest pain, shortness of breath on exertion. She has no trouble ambulating up stairs and can walk indefinitely without stopping.     She continues to smoke cigarettes, smoking 8 cigarettes per day. She is requesting a nicotine patch to continue to cut back.        PHYSICAL EXAMINATION   There were no vitals taken for this visit.    Physical Exam  Constitutional:       Appearance: She is not ill-appearing.   HENT:      Head: Normocephalic and atraumatic.   Eyes:      Extraocular Movements: Extraocular movements intact.      Conjunctiva/sclera: Conjunctivae normal.   Cardiovascular:      Rate and Rhythm: Normal rate and regular rhythm.      Heart sounds: Normal heart sounds.   Pulmonary:      Breath sounds: No wheezing.   Abdominal:      Palpations: Abdomen is soft.      Tenderness: There is abdominal tenderness (Epigastric, periumbilical, LUQ).      Comments: Belching throughout exam    Musculoskeletal:      Cervical back: Normal range of motion.      Right lower leg: No edema.      Left lower leg: No edema.   Skin:     General: Skin is warm and dry.      Capillary Refill: Capillary refill takes less than 2 seconds.   Neurological:      General: No focal deficit present.      Mental Status: She is alert and oriented to person, place, and time. Mental status is at baseline.   Psychiatric:         Mood and Affect: Mood normal.         Behavior: Behavior normal.         Thought Content: Thought content normal.         Judgment: Judgment normal.           LABS / IMAGING / STUDIES   Labs Reviewed: Lipid panel, TSH, CMP, A1C, d-dimer, lipase, troponin     Studies/Imaging Reviewed: CXR       MEDICATIONS      Current Outpatient Medications   Medication Instructions    buPROPion (WELLBUTRIN)  200 mg, oral, Daily    escitalopram (LEXAPRO) 5 mg, oral, 4 times weekly    estradioL (ESTRACE) 0.5 mg, oral, 4 times weekly    pantoprazole (PROTONIX) 40 mg, oral, Daily before breakfast    traZODone (DESYREL) 50 mg, oral, Nightly PRN

## 2024-10-21 ENCOUNTER — OFFICE VISIT (OUTPATIENT)
Dept: INTERNAL MEDICINE | Facility: HOSPITAL | Age: 54
End: 2024-10-21
Payer: COMMERCIAL

## 2024-10-21 VITALS
WEIGHT: 155.9 LBS | TEMPERATURE: 99 F | OXYGEN SATURATION: 97 % | SYSTOLIC BLOOD PRESSURE: 114 MMHG | DIASTOLIC BLOOD PRESSURE: 78 MMHG | RESPIRATION RATE: 17 BRPM | BODY MASS INDEX: 22.32 KG/M2 | HEIGHT: 70 IN | HEART RATE: 80 BPM

## 2024-10-21 DIAGNOSIS — E78.5 DYSLIPIDEMIA: ICD-10-CM

## 2024-10-21 DIAGNOSIS — Z72.0 TOBACCO USE: ICD-10-CM

## 2024-10-21 DIAGNOSIS — K21.9 GERD WITHOUT ESOPHAGITIS: Primary | ICD-10-CM

## 2024-10-21 DIAGNOSIS — R07.1 CHEST PAIN ON BREATHING: ICD-10-CM

## 2024-10-21 PROCEDURE — 99214 OFFICE O/P EST MOD 30 MIN: CPT | Mod: GC

## 2024-10-21 PROCEDURE — 3008F BODY MASS INDEX DOCD: CPT

## 2024-10-21 RX ORDER — PANTOPRAZOLE SODIUM 40 MG/1
40 TABLET, DELAYED RELEASE ORAL
Qty: 30 TABLET | Refills: 1 | Status: SHIPPED | OUTPATIENT
Start: 2024-10-21

## 2024-10-21 RX ORDER — IBUPROFEN 200 MG
1 TABLET ORAL
Qty: 30 PATCH | Refills: 0 | Status: SHIPPED | OUTPATIENT
Start: 2024-10-21 | End: 2024-11-20

## 2024-10-21 RX ORDER — ATORVASTATIN CALCIUM 40 MG/1
40 TABLET, FILM COATED ORAL DAILY
Qty: 90 TABLET | Refills: 1 | Status: SHIPPED | OUTPATIENT
Start: 2024-10-21 | End: 2025-04-19

## 2024-10-21 NOTE — PATIENT INSTRUCTIONS
Continue to take your protonix.     Follow up in 8 weeks.     Start using the nicotine patch.

## 2024-10-21 NOTE — ASSESSMENT & PLAN NOTE
Total cholesterol 218 from 4/2024  Family history of MI in father in early 60s     - Start atorvastatin 40mg today

## 2024-10-21 NOTE — ASSESSMENT & PLAN NOTE
Smokes 8 cigarettes per day, but has been cutting back  Wants to quit    - Nicotine replacement patch

## 2024-10-23 ENCOUNTER — RX ONLY (OUTPATIENT)
Age: 54
Setting detail: RX ONLY
End: 2024-10-23

## 2024-10-23 RX ORDER — CLINDAMYCIN PHOSPHATE 10 MG/ML
LOTION TOPICAL
Qty: 60 | Refills: 6 | Status: CANCELLED
Stop reason: SDUPTHER

## 2024-10-23 RX ORDER — TACROLIMUS 1 MG/G
OINTMENT TOPICAL
Qty: 100 | Refills: 3 | Status: CANCELLED
Stop reason: SDUPTHER

## 2024-12-04 ENCOUNTER — TELEPHONE (OUTPATIENT)
Dept: GASTROENTEROLOGY | Facility: HOSPITAL | Age: 54
End: 2024-12-04
Payer: COMMERCIAL

## 2024-12-05 ENCOUNTER — OFFICE VISIT (OUTPATIENT)
Dept: GASTROENTEROLOGY | Facility: HOSPITAL | Age: 54
End: 2024-12-05
Payer: COMMERCIAL

## 2024-12-05 VITALS
OXYGEN SATURATION: 98 % | WEIGHT: 156.2 LBS | DIASTOLIC BLOOD PRESSURE: 60 MMHG | HEIGHT: 69 IN | SYSTOLIC BLOOD PRESSURE: 118 MMHG | RESPIRATION RATE: 20 BRPM | BODY MASS INDEX: 23.13 KG/M2 | HEART RATE: 82 BPM

## 2024-12-05 DIAGNOSIS — K21.9 GERD WITHOUT ESOPHAGITIS: Primary | ICD-10-CM

## 2024-12-05 PROCEDURE — 99900024 HB NONBILLABLE HOSPITAL CLINIC SERVICE: Performed by: STUDENT IN AN ORGANIZED HEALTH CARE EDUCATION/TRAINING PROGRAM

## 2024-12-05 RX ORDER — FAMOTIDINE 20 MG/1
20 TABLET, FILM COATED ORAL 2 TIMES DAILY PRN
Qty: 60 TABLET | Refills: 0 | Status: SHIPPED | OUTPATIENT
Start: 2024-12-05 | End: 2025-03-17 | Stop reason: ALTCHOICE

## 2024-12-05 RX ORDER — PANTOPRAZOLE SODIUM 20 MG/1
20 TABLET, DELAYED RELEASE ORAL DAILY
Qty: 90 TABLET | Refills: 3 | Status: SHIPPED | OUTPATIENT
Start: 2024-12-05 | End: 2025-12-05

## 2024-12-05 ASSESSMENT — PAIN SCALES - GENERAL: PAINLEVEL_OUTOF10: 0-NO PAIN

## 2024-12-05 NOTE — PROGRESS NOTES
Gastroenterology Clinic Note       SUBJECTIVE   Connie Chi is a 51 y.o. year-old female with a past medical history hysterectomy presenting for follow up.     Patient was last seen in [facility] GI clinic in 2022 for constipation and abdominal pain.   At that time it was recommended that she start on daily magnesium supplementation to maintain regular soft BMs.   Since then, patient did not have any major GI concerns. She did not start magnesium supplementation and has been able to self-regulate bowel habits through lifestyle modifications.     In October 2024, patient was evaluated in [facility] ED for acute epigastric/chest pain. After negative cardiac workup, her symptoms were attributed to GERD 2/2 minocycline she was taking for perioral dermatitis. She was given Rx pantoprazole daily and recommended to follow-up with GI as outpatient upon ED discharge.     She has been taking pantoprazole daily on empty stomach in the morning which has been controlling her dyspepsia. She denies any dysphagia, regular abdominal pain, N/V, weight loss, or changes in bowel habits.       Of note, patient underwent EGD/colonoscopy in 2018 for diarrhea and upper abdominal pain which showed mild gastritis and a healed antral ulcer negative for H. pylori. Colonoscopy was to the TI with good prep which revealed a 4mm HP and was otherwise normal with negative pan-biopsies.  Repeat colonoscopy recommended in 2026.       REVIEW OF SYSTEMS   As per HPI.     MEDICATIONS        Current Outpatient Medications:     escitalopram 5 mg tablet, Take 5 mg by mouth once daily., Disp: , Rfl:     estradiol (ESTRACE) 1 mg tablet, Take 0.5 mg by mouth daily. , Disp: , Rfl:     traZODone (DESYREL) 50 mg tablet, Take 50 mg by mouth nightly as needed.  , Disp: , Rfl:     PHYSICAL EXAMINATION      Visit Vitals  BP (!) 119/58 (BP Location: Right upper arm, Patient Position: Sitting)   Pulse 82   Temp 36.3 °C (97.3 °F) (Temporal)   Resp 16   Ht 1.778 m (5'  "10\")   Wt 65.3 kg (143 lb 14.4 oz)   SpO2 98%   BMI 20.65 kg/m²     Physical Exam  General appearance - alert, well appearing, and in no distress  Eyes - pupils equal and reactive, extraocular eye movements intact, sclera anicteric  Mouth - mucous membranes moist, pharynx normal without lesions  Chest - clear to auscultation, no wheezes, rales or rhonchi, symmetric air entry  Heart - normal rate, regular rhythm  Abdomen - soft, nontender, nondistended, no masses or organomegaly  Skin - normal coloration and turgor, no rashes, no suspicious skin lesions noted     LABS / IMAGING/STUDIES      Labs Reviewed:   Lab Results   Component Value Date    GLUCOSE 95 11/16/2021    CALCIUM 9.3 11/16/2021     (L) 11/16/2021    K 4.6 11/16/2021    CO2 29 11/16/2021     11/16/2021    BUN 8 11/16/2021    CREATININE 1.0 11/16/2021     Lab Results   Component Value Date    WBC 8.49 11/16/2021    HGB 13.2 11/16/2021    HCT 40.2 11/16/2021    MCV 86.1 11/16/2021     11/16/2021     No results found for: HEPCAB    Studies/Imaging Reviewed     ASSESSMENT AND PLAN   Problem List Items Addressed This Visit      #GERD without esophagitis    Exacerbated by medication-minocycline. Discontinued with plans to restart/trial different abx for dermatitis (?erythromycin).   Symptoms controlled on daily PPI (40mg pantoprazole).   No alarm symptoms.   Patient would like to eventually de-escalate.      Plan:  -If continues antibiotics, patient to stay on pantoprazole 40mg daily to keep GERD symptoms controlled.   -To taper the pantoprazole: 40mg/20mg alternating each day x2 weeks, then 20mg daily x2 weeks, then 20mg every other day.   -OK to take famotidine 20mg as needed, up to 4 times a day if having breakthrough symptoms during the taper.  -Call/message if having any questions/concerns.   -Follow-up as needed.              Abimael Leesouns, DO  PGY-6     "

## 2024-12-05 NOTE — PATIENT INSTRUCTIONS
If you continue antibiotics, stay on pantoprazole 40mg daily to keep GERD symptoms controlled.     To taper the pantoprazole:  40mg/20mg alternating each day x2 weeks  Then  20mg daily x2 weeks   Then   20mg every other day.     Take famotidine 20mg as needed, up to 4 times a day if you have breakthrough symptoms during the taper.

## 2024-12-06 NOTE — ASSESSMENT & PLAN NOTE
Exacerbated by medication-minocycline. Discontinued with plans to restart/trial different abx for dermatitis (?erythromycin).   Symptoms controlled on daily PPI (40mg pantoprazole).   No alarm symptoms.   Patient would like to eventually de-escalate.     Plan:  -If continues antibiotics, patient to stay on pantoprazole 40mg daily to keep GERD symptoms controlled.   -To taper the pantoprazole: 40mg/20mg alternating each day x2 weeks, then 20mg daily x2 weeks, then 20mg every other day.   -OK to take famotidine 20mg as needed, up to 4 times a day if having breakthrough symptoms during the taper.  -Call/message if having any questions/concerns.   -Follow-up as needed.

## 2024-12-06 NOTE — PROGRESS NOTES
I have discussed the patient's care with the fellow. I have reviewed the patient's history and fellow's findings on exam, the patient's diagnosis/differential diagnosis and treatment plan. I concur with the treatment plan as documented by the fellow, except for my comments below or within additional notes today.    No alarm symptoms warranting endoscopic evaluation. Agree with tapering proton-pump inhibitor as tolerated and trialing as needed histamine 2-receptor antagonists (H2RAs). Should she require the latter regularly, however, would recommend resuming/optimizing her proton-pump inhibitor given the risk of tachyphylaxis with frequent H2RA use. Should also incorporate lifestyle measures to avoid/minimize pyrosis, such as maintaining a normal body weight, avoiding individual-specific trigger foods, and avoiding reclining within two hours postprandially.

## 2024-12-19 ENCOUNTER — TELEPHONE (OUTPATIENT)
Dept: INTERNAL MEDICINE | Facility: HOSPITAL | Age: 54
End: 2024-12-19
Payer: COMMERCIAL

## 2024-12-19 NOTE — TELEPHONE ENCOUNTER
Pt calling, stated she is experiencing pain in her left honorio at goes to the middle back area. Requesting to further discuss,

## 2024-12-19 NOTE — TELEPHONE ENCOUNTER
Spoke with patient. She stated that she has been dealing with stomach pain since early December. She stated that recently it has traveled to her left side and back. Patient stated that it is sore and rated the pain 5/10. She did state that she has some diarrhea and SOB. Patient has been taking tylenol with some relief. Advised that patient go to urgent care or the ED. Patient stated that she is going to add herself to the wait list.

## 2025-01-03 NOTE — ASSESSMENT & PLAN NOTE
Re-started on pantoprazole 40 mg daily after GERD was exacerbated by minocycline, which has since been discontinued  Taking famotidine OTC as needed, only taking on a weekly basis      - Continue PPI 40mg daily   - Plan to taper PPI: 40mg/20mg alternating each day x2 weeks, then 20mg daily x2 weeks, then 20mg every other day

## 2025-01-03 NOTE — PROGRESS NOTES
[facility]  Internal Medicine Progress Note         Assessment & Plan  Tobacco use  Smokes 8-10 cigarettes per day, which has been consistent over the last 2 months  Pre-contemplative about quitting today     - Counseled on Chantix, would like to discuss with psychiatrist   Orders:    Hemoglobin A1c; Future    CBC and differential; Future    Lipid panel; Future    Comprehensive metabolic panel; Future    GERD without esophagitis  Re-started on pantoprazole 40 mg daily after GERD was exacerbated by minocycline, which has since been discontinued  Taking famotidine OTC as needed, only taking on a weekly basis      - Continue PPI 40mg daily   - Plan to taper PPI: 40mg/20mg alternating each day x2 weeks, then 20mg daily x2 weeks, then 20mg every other day        Chronic left-sided back pain, unspecified back location  Left sided back pain that occasionally radiates to the abdomen  She now notes radicular pain extending to the hip and down her left leg and into her foot  She reports history of cervical spine disk herniation in the past     - X-ray T/L spine   - PT  - Orthopedics if no improvement   Orders:    Ambulatory referral to Physical Therapy; Future    X-RAY LUMBAR SPINE COMPLETE 4+ VIEWS; Future    X-RAY THORACIC SPINE 4+ VIEWS; Future    Pain of left hip  No tenderness to palpation  Hip flexion exacerbates the pain, which radiates to her groin    Orders:    Ambulatory referral to Physical Therapy; Future    X-RAY HIP WITH OR WITHOUT PELVIS 4+ VW LEFT; Future    Chronic pain of left knee  No tenderness to palpation  Exacerbated with squatting and knee flexion   Orders:    Ambulatory referral to Physical Therapy; Future    X-RAY KNEE LEFT 4+ VIEWS; Future      Health care maintenance: Deferred     No follow-ups on file.      Nguyễn Mcclelland MD        SUBJECTIVE   Connie Chi is a 54 y.o. year-old female, primary patient of Dr. Mcclelland with a past medical history of  ADHD, GERD, and  ADAM who presents for follow up.     Interval History: Patient has been in her usual state of health since her last appointment. Today she states that her dermatitis has been worsening. She has not been on an antibiotic since her initial GERD symptoms from minocycline. She plans to follow up with her dermatologist. Her GERD symptoms have been stable as she continues to take her PPI regularly. She has multiple complaints of MSK pain in her left knee, hip, lumbar, and thoracic spine which has been ongoing for many years.      PHYSICAL EXAMINATION   There were no vitals taken for this visit.    Physical Exam  Constitutional:       Appearance: Normal appearance. She is not ill-appearing.   HENT:      Head: Normocephalic and atraumatic.   Eyes:      Extraocular Movements: Extraocular movements intact.      Conjunctiva/sclera: Conjunctivae normal.   Cardiovascular:      Rate and Rhythm: Normal rate and regular rhythm.      Heart sounds: Normal heart sounds. No murmur heard.  Pulmonary:      Breath sounds: Normal breath sounds. No wheezing, rhonchi or rales.   Musculoskeletal:         General: No tenderness.      Right lower leg: No edema.      Left lower leg: No edema.      Comments: No T/L spine or paraspinal tenderness  Pain in the left hip which radiates to the groin with hip flexion  5/5 strength throughout    Skin:     General: Skin is warm and dry.   Neurological:      General: No focal deficit present.      Mental Status: She is alert.      Sensory: No sensory deficit.      Motor: No weakness.      Coordination: Coordination normal.   Psychiatric:         Mood and Affect: Mood normal.         Behavior: Behavior normal.         Thought Content: Thought content normal.         Judgment: Judgment normal.           LABS / IMAGING / STUDIES   Labs Reviewed: Lipid panel, TSH, CMP    Studies/Imaging Reviewed: CXR       MEDICATIONS      Current Outpatient Medications   Medication Instructions    atorvastatin (LIPITOR) 40  mg, oral, Daily    buPROPion (WELLBUTRIN) 200 mg, oral, Daily    escitalopram (LEXAPRO) 5 mg, oral, 4 times weekly    estradioL (ESTRACE) 0.5 mg, oral, 4 times weekly    famotidine (PEPCID) 20 mg, oral, 2 times daily PRN    nicotine (NICODERM CQ) 14 mg/24 hr 1 patch, transdermal, Every 24 hours interval    pantoprazole (PROTONIX) 40 mg, oral, Daily before breakfast    pantoprazole (PROTONIX) 20 mg, oral, Daily    traZODone (DESYREL) 50 mg, oral, Nightly PRN

## 2025-01-03 NOTE — ASSESSMENT & PLAN NOTE
Smokes 8-10 cigarettes per day, which has been consistent over the last 2 months  Pre-contemplative about quitting today     - Counseled on Chantix, would like to discuss with psychiatrist   Orders:    Hemoglobin A1c; Future    CBC and differential; Future    Lipid panel; Future    Comprehensive metabolic panel; Future

## 2025-01-07 ENCOUNTER — OFFICE VISIT (OUTPATIENT)
Dept: INTERNAL MEDICINE | Facility: HOSPITAL | Age: 55
End: 2025-01-07
Payer: COMMERCIAL

## 2025-01-07 VITALS
TEMPERATURE: 98.1 F | DIASTOLIC BLOOD PRESSURE: 63 MMHG | OXYGEN SATURATION: 99 % | HEART RATE: 89 BPM | SYSTOLIC BLOOD PRESSURE: 132 MMHG | WEIGHT: 155.1 LBS | HEIGHT: 70 IN | RESPIRATION RATE: 18 BRPM | BODY MASS INDEX: 22.2 KG/M2

## 2025-01-07 DIAGNOSIS — M25.562 CHRONIC PAIN OF LEFT KNEE: ICD-10-CM

## 2025-01-07 DIAGNOSIS — K21.9 GERD WITHOUT ESOPHAGITIS: ICD-10-CM

## 2025-01-07 DIAGNOSIS — Z72.0 TOBACCO USE: Primary | ICD-10-CM

## 2025-01-07 DIAGNOSIS — M54.9 CHRONIC LEFT-SIDED BACK PAIN, UNSPECIFIED BACK LOCATION: ICD-10-CM

## 2025-01-07 DIAGNOSIS — G89.29 CHRONIC LEFT-SIDED BACK PAIN, UNSPECIFIED BACK LOCATION: ICD-10-CM

## 2025-01-07 DIAGNOSIS — G89.29 CHRONIC PAIN OF LEFT KNEE: ICD-10-CM

## 2025-01-07 DIAGNOSIS — M25.552 PAIN OF LEFT HIP: ICD-10-CM

## 2025-01-07 PROCEDURE — 99213 OFFICE O/P EST LOW 20 MIN: CPT | Mod: GC

## 2025-01-07 PROCEDURE — 3008F BODY MASS INDEX DOCD: CPT

## 2025-01-07 ASSESSMENT — PAIN SCALES - GENERAL: PAINLEVEL_OUTOF10: 0-NO PAIN

## 2025-01-07 NOTE — PATIENT INSTRUCTIONS
Complete the X-rays and schedule an appointment with PT    Complete the blood work before your next appointment

## 2025-01-14 NOTE — PROGRESS NOTES
I performed a history and physical examination of the patient and discussed the management with the Resident. I reviewed the Resident's note and agree with the documented findings and plan of care, except for my comments below or within the additional notes today.Reviewed smoking cessation options. She will discuss Chantix trial with her psychiatrist. Checking x-rays in relation to multiple joint pains.

## 2025-02-10 ENCOUNTER — HOSPITAL ENCOUNTER (OUTPATIENT)
Dept: RADIOLOGY | Facility: HOSPITAL | Age: 55
Discharge: HOME | End: 2025-02-10
Payer: COMMERCIAL

## 2025-02-10 DIAGNOSIS — M25.562 CHRONIC PAIN OF LEFT KNEE: ICD-10-CM

## 2025-02-10 DIAGNOSIS — M54.9 CHRONIC LEFT-SIDED BACK PAIN, UNSPECIFIED BACK LOCATION: ICD-10-CM

## 2025-02-10 DIAGNOSIS — G89.29 CHRONIC PAIN OF LEFT KNEE: ICD-10-CM

## 2025-02-10 DIAGNOSIS — G89.29 CHRONIC LEFT-SIDED BACK PAIN, UNSPECIFIED BACK LOCATION: ICD-10-CM

## 2025-02-10 DIAGNOSIS — M25.552 PAIN OF LEFT HIP: ICD-10-CM

## 2025-02-10 PROCEDURE — 72072 X-RAY EXAM THORAC SPINE 3VWS: CPT

## 2025-02-10 PROCEDURE — 73564 X-RAY EXAM KNEE 4 OR MORE: CPT | Mod: LT

## 2025-02-10 PROCEDURE — 73502 X-RAY EXAM HIP UNI 2-3 VIEWS: CPT | Mod: LT

## 2025-02-10 PROCEDURE — 72110 X-RAY EXAM L-2 SPINE 4/>VWS: CPT

## 2025-02-15 LAB
ALBUMIN SERPL-MCNC: 4.2 G/DL (ref 3.8–4.9)
ALP SERPL-CCNC: 90 IU/L (ref 44–121)
ALT SERPL-CCNC: 15 IU/L (ref 0–32)
AST SERPL-CCNC: 16 IU/L (ref 0–40)
BASOPHILS # BLD AUTO: 0.1 X10E3/UL (ref 0–0.2)
BASOPHILS NFR BLD AUTO: 1 %
BILIRUB SERPL-MCNC: <0.2 MG/DL (ref 0–1.2)
BUN SERPL-MCNC: 11 MG/DL (ref 6–24)
BUN/CREAT SERPL: 12 (ref 9–23)
CALCIUM SERPL-MCNC: 9.2 MG/DL (ref 8.7–10.2)
CHLORIDE SERPL-SCNC: 103 MMOL/L (ref 96–106)
CHOLEST SERPL-MCNC: 170 MG/DL (ref 100–199)
CO2 SERPL-SCNC: 23 MMOL/L (ref 20–29)
CREAT SERPL-MCNC: 0.89 MG/DL (ref 0.57–1)
EGFRCR SERPLBLD CKD-EPI 2021: 77 ML/MIN/1.73
EOSINOPHIL # BLD AUTO: 0.2 X10E3/UL (ref 0–0.4)
EOSINOPHIL NFR BLD AUTO: 2 %
ERYTHROCYTE [DISTWIDTH] IN BLOOD BY AUTOMATED COUNT: 13.7 % (ref 11.7–15.4)
GLOBULIN SER CALC-MCNC: 2.4 G/DL (ref 1.5–4.5)
GLUCOSE SERPL-MCNC: 116 MG/DL (ref 70–99)
HBA1C MFR BLD: 6 % (ref 4.8–5.6)
HCT VFR BLD AUTO: 37.7 % (ref 34–46.6)
HDLC SERPL-MCNC: 41 MG/DL
HGB BLD-MCNC: 12.3 G/DL (ref 11.1–15.9)
IMM GRANULOCYTES # BLD AUTO: 0 X10E3/UL (ref 0–0.1)
IMM GRANULOCYTES NFR BLD AUTO: 0 %
LDLC SERPL CALC-MCNC: 115 MG/DL (ref 0–99)
LYMPHOCYTES # BLD AUTO: 2.1 X10E3/UL (ref 0.7–3.1)
LYMPHOCYTES NFR BLD AUTO: 29 %
MCH RBC QN AUTO: 27.4 PG (ref 26.6–33)
MCHC RBC AUTO-ENTMCNC: 32.6 G/DL (ref 31.5–35.7)
MCV RBC AUTO: 84 FL (ref 79–97)
MONOCYTES # BLD AUTO: 0.4 X10E3/UL (ref 0.1–0.9)
MONOCYTES NFR BLD AUTO: 5 %
NEUTROPHILS # BLD AUTO: 4.6 X10E3/UL (ref 1.4–7)
NEUTROPHILS NFR BLD AUTO: 63 %
PLATELET # BLD AUTO: 294 X10E3/UL (ref 150–450)
POTASSIUM SERPL-SCNC: 4.6 MMOL/L (ref 3.5–5.2)
PROT SERPL-MCNC: 6.6 G/DL (ref 6–8.5)
RBC # BLD AUTO: 4.49 X10E6/UL (ref 3.77–5.28)
SODIUM SERPL-SCNC: 140 MMOL/L (ref 134–144)
TRIGL SERPL-MCNC: 75 MG/DL (ref 0–149)
VLDLC SERPL CALC-MCNC: 14 MG/DL (ref 5–40)
WBC # BLD AUTO: 7.3 X10E3/UL (ref 3.4–10.8)

## 2025-03-13 ENCOUNTER — APPOINTMENT (EMERGENCY)
Dept: RADIOLOGY | Facility: HOSPITAL | Age: 55
End: 2025-03-13
Payer: COMMERCIAL

## 2025-03-13 ENCOUNTER — HOSPITAL ENCOUNTER (EMERGENCY)
Facility: HOSPITAL | Age: 55
Discharge: HOME | End: 2025-03-14
Attending: EMERGENCY MEDICINE | Admitting: EMERGENCY MEDICINE
Payer: COMMERCIAL

## 2025-03-13 ENCOUNTER — TELEPHONE (OUTPATIENT)
Dept: INTERNAL MEDICINE | Facility: HOSPITAL | Age: 55
End: 2025-03-13
Payer: COMMERCIAL

## 2025-03-13 VITALS
HEIGHT: 70 IN | SYSTOLIC BLOOD PRESSURE: 123 MMHG | TEMPERATURE: 98.5 F | DIASTOLIC BLOOD PRESSURE: 64 MMHG | BODY MASS INDEX: 20.19 KG/M2 | HEART RATE: 70 BPM | RESPIRATION RATE: 14 BRPM | OXYGEN SATURATION: 99 % | WEIGHT: 141 LBS

## 2025-03-13 DIAGNOSIS — R51.9 ACUTE NONINTRACTABLE HEADACHE, UNSPECIFIED HEADACHE TYPE: ICD-10-CM

## 2025-03-13 DIAGNOSIS — R10.32 LEFT LOWER QUADRANT ABDOMINAL PAIN: Primary | ICD-10-CM

## 2025-03-13 LAB
ALBUMIN SERPL-MCNC: 4.4 G/DL (ref 3.5–5.7)
ALP SERPL-CCNC: 71 IU/L (ref 34–125)
ALT SERPL-CCNC: 14 IU/L (ref 7–52)
ANION GAP SERPL CALC-SCNC: 6 MEQ/L (ref 3–15)
AST SERPL-CCNC: 17 IU/L (ref 13–39)
BASOPHILS # BLD: 0.05 K/UL (ref 0.01–0.1)
BASOPHILS NFR BLD: 0.6 %
BILIRUB SERPL-MCNC: 0.4 MG/DL (ref 0.3–1.2)
BUN SERPL-MCNC: 13 MG/DL (ref 7–25)
CALCIUM SERPL-MCNC: 9.7 MG/DL (ref 8.6–10.3)
CHLORIDE SERPL-SCNC: 101 MEQ/L (ref 98–107)
CO2 SERPL-SCNC: 28 MEQ/L (ref 21–31)
CREAT SERPL-MCNC: 1.1 MG/DL (ref 0.6–1.2)
DIFFERENTIAL METHOD BLD: ABNORMAL
EGFRCR SERPLBLD CKD-EPI 2021: 59.8 ML/MIN/1.73M*2
EOSINOPHIL # BLD: 0.17 K/UL (ref 0.04–0.36)
EOSINOPHIL NFR BLD: 2.1 %
ERYTHROCYTE [DISTWIDTH] IN BLOOD BY AUTOMATED COUNT: 13.7 % (ref 11.7–14.4)
FLUAV RNA SPEC QL NAA+PROBE: NEGATIVE
FLUBV RNA SPEC QL NAA+PROBE: NEGATIVE
GLUCOSE SERPL-MCNC: 138 MG/DL (ref 70–99)
HCG UR QL: NEGATIVE
HCT VFR BLD AUTO: 36.3 % (ref 35–45)
HGB BLD-MCNC: 11.9 G/DL (ref 11.8–15.7)
IMM GRANULOCYTES # BLD AUTO: 0.01 K/UL (ref 0–0.08)
IMM GRANULOCYTES NFR BLD AUTO: 0.1 %
LYMPHOCYTES # BLD: 2.94 K/UL (ref 1.2–3.5)
LYMPHOCYTES NFR BLD: 36 %
MCH RBC QN AUTO: 27.7 PG (ref 28–33.2)
MCHC RBC AUTO-ENTMCNC: 32.8 G/DL (ref 32.2–35.5)
MCV RBC AUTO: 84.4 FL (ref 83–98)
MONOCYTES # BLD: 0.45 K/UL (ref 0.28–0.8)
MONOCYTES NFR BLD: 5.5 %
NEUTROPHILS # BLD: 4.54 K/UL (ref 1.7–7)
NEUTS SEG NFR BLD: 55.7 %
NRBC BLD-RTO: 0 %
PLATELET # BLD AUTO: 296 K/UL (ref 150–369)
PMV BLD AUTO: 10.5 FL (ref 9.4–12.3)
POTASSIUM SERPL-SCNC: 4 MEQ/L (ref 3.5–5.1)
PROT SERPL-MCNC: 7.3 G/DL (ref 6–8.2)
RBC # BLD AUTO: 4.3 M/UL (ref 3.93–5.22)
RSV RNA SPEC QL NAA+PROBE: NEGATIVE
SARS-COV-2 RNA RESP QL NAA+PROBE: NEGATIVE
SODIUM SERPL-SCNC: 135 MEQ/L (ref 136–145)
WBC # BLD AUTO: 8.16 K/UL (ref 3.8–10.5)

## 2025-03-13 PROCEDURE — 63600000 HC DRUGS/DETAIL CODE: Mod: JZ

## 2025-03-13 PROCEDURE — 3E033GC INTRODUCTION OF OTHER THERAPEUTIC SUBSTANCE INTO PERIPHERAL VEIN, PERCUTANEOUS APPROACH: ICD-10-PCS | Performed by: EMERGENCY MEDICINE

## 2025-03-13 PROCEDURE — 85025 COMPLETE CBC W/AUTO DIFF WBC: CPT

## 2025-03-13 PROCEDURE — 25800000 HC PHARMACY IV SOLUTIONS

## 2025-03-13 PROCEDURE — 84703 CHORIONIC GONADOTROPIN ASSAY: CPT | Performed by: EMERGENCY MEDICINE

## 2025-03-13 PROCEDURE — 63700000 HC SELF-ADMINISTRABLE DRUG

## 2025-03-13 PROCEDURE — 96374 THER/PROPH/DIAG INJ IV PUSH: CPT | Mod: 59

## 2025-03-13 PROCEDURE — 96375 TX/PRO/DX INJ NEW DRUG ADDON: CPT

## 2025-03-13 PROCEDURE — 74177 CT ABD & PELVIS W/CONTRAST: CPT

## 2025-03-13 PROCEDURE — 70450 CT HEAD/BRAIN W/O DYE: CPT

## 2025-03-13 PROCEDURE — 99284 EMERGENCY DEPT VISIT MOD MDM: CPT | Mod: U5,25

## 2025-03-13 PROCEDURE — 63600105 HC IODINE BASED CONTRAST: Mod: JZ

## 2025-03-13 PROCEDURE — 3E0337Z INTRODUCTION OF ELECTROLYTIC AND WATER BALANCE SUBSTANCE INTO PERIPHERAL VEIN, PERCUTANEOUS APPROACH: ICD-10-PCS | Performed by: EMERGENCY MEDICINE

## 2025-03-13 PROCEDURE — 93005 ELECTROCARDIOGRAM TRACING: CPT

## 2025-03-13 PROCEDURE — 85025 COMPLETE CBC W/AUTO DIFF WBC: CPT | Performed by: EMERGENCY MEDICINE

## 2025-03-13 PROCEDURE — 36415 COLL VENOUS BLD VENIPUNCTURE: CPT

## 2025-03-13 PROCEDURE — 96361 HYDRATE IV INFUSION ADD-ON: CPT

## 2025-03-13 PROCEDURE — 80053 COMPREHEN METABOLIC PANEL: CPT | Performed by: EMERGENCY MEDICINE

## 2025-03-13 PROCEDURE — 3E0333Z INTRODUCTION OF ANTI-INFLAMMATORY INTO PERIPHERAL VEIN, PERCUTANEOUS APPROACH: ICD-10-PCS | Performed by: EMERGENCY MEDICINE

## 2025-03-13 PROCEDURE — 87637 SARSCOV2&INF A&B&RSV AMP PRB: CPT | Performed by: EMERGENCY MEDICINE

## 2025-03-13 RX ORDER — KETOROLAC TROMETHAMINE 15 MG/ML
15 INJECTION, SOLUTION INTRAMUSCULAR; INTRAVENOUS ONCE
Status: COMPLETED | OUTPATIENT
Start: 2025-03-13 | End: 2025-03-13

## 2025-03-13 RX ORDER — ACETAMINOPHEN 325 MG/1
975 TABLET ORAL ONCE
Status: COMPLETED | OUTPATIENT
Start: 2025-03-13 | End: 2025-03-13

## 2025-03-13 RX ORDER — ONDANSETRON HYDROCHLORIDE 2 MG/ML
4 INJECTION, SOLUTION INTRAVENOUS ONCE
Status: COMPLETED | OUTPATIENT
Start: 2025-03-13 | End: 2025-03-13

## 2025-03-13 RX ORDER — IOPAMIDOL 755 MG/ML
100 INJECTION, SOLUTION INTRAVASCULAR
Status: COMPLETED | OUTPATIENT
Start: 2025-03-13 | End: 2025-03-13

## 2025-03-13 RX ADMIN — ONDANSETRON 4 MG: 2 INJECTION INTRAMUSCULAR; INTRAVENOUS at 22:07

## 2025-03-13 RX ADMIN — SODIUM CHLORIDE 1000 ML: 9 INJECTION, SOLUTION INTRAVENOUS at 22:07

## 2025-03-13 RX ADMIN — ACETAMINOPHEN 975 MG: 325 TABLET ORAL at 23:36

## 2025-03-13 RX ADMIN — IOPAMIDOL 100 ML: 755 INJECTION, SOLUTION INTRAVENOUS at 22:50

## 2025-03-13 RX ADMIN — KETOROLAC TROMETHAMINE 15 MG: 15 INJECTION, SOLUTION INTRAMUSCULAR; INTRAVENOUS at 22:07

## 2025-03-13 ASSESSMENT — ENCOUNTER SYMPTOMS
DIARRHEA: 1
DIAPHORESIS: 1
DYSURIA: 0
BACK PAIN: 1
HEADACHES: 1
HEMATURIA: 1
FREQUENCY: 0
SHORTNESS OF BREATH: 0
FEVER: 0
ABDOMINAL PAIN: 1
CHILLS: 0
VOMITING: 0
BLOOD IN STOOL: 1

## 2025-03-13 NOTE — TELEPHONE ENCOUNTER
Spoke with patient regarding symptoms. She reported nausea, hot flashes, headache, and diarrhea for 6 days. Pt has been using a BRAT diet d/t nausea. Pt rated headache pain as 5/10. Pt has mild sinus pressure. Pt informed she hit her head a few weeks ago. Pt has a bruise/redness on the right side of her forehead from the injury. Pt denied sick contacts, chest pain, dizziness, vision changes, syncope, vomiting, or SOB. She also reported left-sided abdominal/flank. Pt rated pain as 2/10. Pt took Tylenol, and Tylenol-Advil without relief. No SDS appt available this week. Advised patient to visit UC/ED for further evaluation. She verbalized understanding, and agreeable to plan. Message routed to PCP and GI physician per patient request.

## 2025-03-13 NOTE — TELEPHONE ENCOUNTER
Pt calling, stated she has been feeling feverish, nauseous and has been having a headache. Been going on for 6 days. Requesting to discuss for SDS.

## 2025-03-14 LAB
ATRIAL RATE: 67
P AXIS: 66
PR INTERVAL: 180
QRS DURATION: 82
QT INTERVAL: 392
QTC CALCULATION(BAZETT): 414
R AXIS: 20
T WAVE AXIS: 34
VENTRICULAR RATE: 67

## 2025-03-14 PROCEDURE — 93010 ELECTROCARDIOGRAM REPORT: CPT | Performed by: INTERNAL MEDICINE

## 2025-03-14 ASSESSMENT — ENCOUNTER SYMPTOMS: NAUSEA: 1

## 2025-03-14 NOTE — ED ATTESTATION NOTE
Physician Attestation:     I have personally seen and examined the patient, participated in the management, and agree with the findings in the above note except as where stated.      I have personally performed the key components of the encounter and provided a substantive portion of the care and medical decision making.    The Physician Assistant and I discussed  the case, workup, and disposition.        Chief Complaint  Chief Complaint   Patient presents with    Flank Pain    Headache       My focused history, examination, assessment, and plan of care is as follows:      History  54 y.o. female presents to the emergency department for evaluation.  Patient with lower abdominal pain worse on the left some pain in bilateral flanks denies dysuria hematuria frequency urgency feels rundown.  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  Headache.  Sent in by urgent care      Physical  Vital signs reviewed   Nontoxic no acute distress  Regular rate and rhythm  Lungs clear to auscultation  No cva ttp  Mild abd ttp non peritoneal  Normal speech and mentation coordinated movements  Flat, anxious      -Prior records reviewed: pcp notes      -The patient's chief complaint is an acute problem.    *Refer to ED Workup tab and PA chart for further documentation.     Marvin Kee, DO  03/13/25 [id number]

## 2025-03-14 NOTE — DISCHARGE INSTRUCTIONS
Take Tylenol 1000 mg every 8 hours and Motrin 600 mg every 6 hours for the next 7 days as needed for pain.  Be careful not to take more than 4 g of Tylenol in a 24-hour period as this may cause liver damage.  Follow-up with your primary care doctors and GI doctors over the next week.  Return to the emergency department with any new, worsening, or other concerning symptoms.     Please follow-up with a primary care provider (PCP) regularly.  Please bring all your discharge paperwork with you to your follow up appointment. Your primary care physician will go over all of your results again including any incidental findings.  Your primary care provider can also help you implement the recommendations we gave you today, coordinate care among your specialist, as well as make sure you are up-to-date with wellness exams, immunizations, and preventive screenings.   Your PCP can also help when you are feeling sick, potentially avoiding the need for urgent care or emergency department visits.  For these reasons, it is important that you follow-up with your PCP at least annually or more often based upon your medical conditions.  If you do not have a PCP, please call [phone] ([phone]) for help finding one.

## 2025-03-14 NOTE — ED PROVIDER NOTES
"Emergency Medicine Note  HPI   HISTORY OF PRESENT ILLNESS     54-year-old female with past medical history ADHD, anxiety, IBD, endometriosis, GERD/gastritis, kidney stones presenting for evaluation of 6 days of headache, nausea, hot flashes, as well as left lower quadrant abdominal pain that started earlier today.  Reports she \"bumped her head\" 5 or 6 days ago but is unsure if this is related.  Also notes 1 episode of bloody mucus in her stools however this has since resolved.  Went to urgent care with her symptoms who sent her to the emergency department for further evaluation.  Denies known fevers, chest pain, shortness of breath, vomiting, or urinary symptoms.  Notes microscopic blood in urinalysis from urgent care.  Has not taken anything for pain today.  Last bowel movement earlier today.          Patient History   PAST HISTORY     Reviewed from Nursing Triage:       Past Medical History:   Diagnosis Date    ADHD     Anxiety     Bowel disease, inflammatory     Endometriosis     Gastroenteritis     GERD (gastroesophageal reflux disease)        Past Surgical History   Procedure Laterality Date    FLEXIBLE COLONOSCOPY PROXIMAL TO SPLENIC FLEXURE WITH BIOPSY N/A 6/20/2018    Performed by Gerson Bocanegra MD at [facility]    Hysterectomy  2008    IZZY 2/2 endometriosis    UPPER GASTROINTESTINAL ENDOSCOPY WITH BIOPSY N/A 6/20/2018    Performed by Gerson Bocanegra MD at [facility]    [place] tooth extraction         Family History   Problem Relation Name Age of Onset    Heart disease Biological Father      Lumbar disc disease Biological Father      Dementia Biological Mother         Social History     Tobacco Use    Smoking status: Every Day     Current packs/day: 0.50     Average packs/day: 0.5 packs/day for 30.0 years (15.0 ttl pk-yrs)     Types: Cigarettes     Passive exposure: Never    Smokeless tobacco: Never   Vaping Use    Vaping status: Never Used   Substance Use Topics    Alcohol use: No    Drug use: Yes "     Types: Marijuana         Review of Systems   REVIEW OF SYSTEMS     Review of Systems   Constitutional:  Positive for diaphoresis. Negative for chills and fever.   Respiratory:  Negative for shortness of breath.    Cardiovascular:  Negative for chest pain.   Gastrointestinal:  Positive for abdominal pain, blood in stool, diarrhea and nausea. Negative for vomiting.   Genitourinary:  Positive for hematuria (Microscopic on urinalysis at urgent care). Negative for dysuria, frequency and urgency.   Musculoskeletal:  Positive for back pain.   Neurological:  Positive for headaches.         VITALS     ED Vitals      Date/Time Temp Pulse Resp BP SpO2 [facility]   03/13/25 2333 -- 70 14 123/64 99 % [facility]   03/13/25 2116 36.9 °C (98.5 °F) 84 17 122/71 97 % [facility]          Pulse Ox %: 97 % (03/13/25 2145)  Pulse Ox Interpretation: Normal (03/13/25 2145)  Heart Rate: 84 (03/13/25 2145)  Rhythm Strip Interpretation: Normal Sinus Rhythm (03/13/25 2145)     Physical Exam   PHYSICAL EXAM     Physical Exam  Constitutional:       General: She is not in acute distress.     Appearance: She is not ill-appearing, toxic-appearing or diaphoretic.   HENT:      Head: Normocephalic and atraumatic.   Cardiovascular:      Rate and Rhythm: Normal rate and regular rhythm.      Heart sounds: No murmur heard.     No friction rub. No gallop.   Pulmonary:      Effort: Pulmonary effort is normal. No respiratory distress.      Breath sounds: No wheezing, rhonchi or rales.   Abdominal:      General: There is no distension.      Palpations: Abdomen is soft.      Tenderness: There is abdominal tenderness in the left lower quadrant. There is no right CVA tenderness, left CVA tenderness, guarding or rebound.   Musculoskeletal:      Right lower leg: No edema.      Left lower leg: No edema.   Skin:     General: Skin is warm and dry.   Neurological:      General: No focal deficit present.      Mental Status: She is alert and oriented to person, place, and time.    Psychiatric:         Mood and Affect: Mood normal.         Behavior: Behavior normal.           PROCEDURES     Procedures     DATA     Results       Procedure Component Value Units Date/Time    Comprehensive metabolic panel [[id number]]  (Abnormal) Collected: 03/13/25 2122    Specimen: Blood, Venous Updated: 03/13/25 2212     Sodium 135 mEQ/L      Potassium 4.0 mEQ/L      Comment: Results obtained on plasma. Plasma Potassium values may be up to 0.4 mEQ/L less than serum values. The differences may be greater for patients with high platelet or white cell counts.        Chloride 101 mEQ/L      CO2 28 mEQ/L      BUN 13 mg/dL      Creatinine 1.1 mg/dL      Glucose 138 mg/dL      Calcium 9.7 mg/dL      AST (SGOT) 17 IU/L      ALT (SGPT) 14 IU/L      Alkaline Phosphatase 71 IU/L      Total Protein 7.3 g/dL      Comment: Test performed on plasma which typically contains approximately 0.4 g/dL more protein than serum.        Albumin 4.4 g/dL      Bilirubin, Total 0.4 mg/dL      eGFR 59.8 mL/min/1.73m*2      Comment: Calculation based on the Chronic Kidney Disease Epidemiology Collaboration (CKD-EPI) equation refit without adjustment for race.        Anion Gap 6 mEQ/L     SARS-COV-2 (COVID-19)/ FLU A/B, AND RSV, PCR Nasopharynx [[id number]]  (Normal) Collected: 03/13/25 2122    Specimen: Nasopharyngeal Swab from Nasopharynx Updated: 03/13/25 2210     SARS-CoV-2 (COVID-19) Negative     Influenza A Negative     Influenza B Negative     Respiratory Syncytial Virus Negative    Narrative:      Testing performed using real-time PCR for detection of COVID-19. EUA approved validation studies performed on site.     BhCG, Serum, Qual (if menstruating female and no urine) [[id number]]  (Normal) Collected: 03/13/25 2122    Specimen: Blood, Venous Updated: 03/13/25 2206     Preg Test, Serum Negative    CBC and differential [[id number]]  (Abnormal) Collected: 03/13/25 2122    Specimen: Blood, Venous Updated: 03/13/25 2131     WBC 8.16 K/uL       RBC 4.30 M/uL      Hemoglobin 11.9 g/dL      Hematocrit 36.3 %      MCV 84.4 fL      MCH 27.7 pg      MCHC 32.8 g/dL      RDW 13.7 %      Platelets 296 K/uL      MPV 10.5 fL      Differential Type Auto     nRBC 0.0 %      Immature Granulocytes 0.1 %      Neutrophils 55.7 %      Lymphocytes 36.0 %      Monocytes 5.5 %      Eosinophils 2.1 %      Basophils 0.6 %      Immature Granulocytes, Absolute 0.01 K/uL      Neutrophils, Absolute 4.54 K/uL      Lymphocytes, Absolute 2.94 K/uL      Monocytes, Absolute 0.45 K/uL      Eosinophils, Absolute 0.17 K/uL      Basophils, Absolute 0.05 K/uL             Imaging Results              CT ABDOMEN PELVIS WITH IV CONTRAST (Preliminary result)  Result time 03/14/25 00:26:15      Preliminary Interpretation    Preliminary Impression:    Comparison is made to the prior exam of 2016. No evidence of bowel obstruction, free air or fluid. No bowel wall thickening, dilatation or surrounding fat stranding. No retroperitoneal adenopathy. Unremarkable appendix. No diverticula    The solid viscera are unremarkable. The gallbladder is poorly distended and normal in wall thickness without calcified stones. No biliary dilatation present.    The kidneys take up and excrete contrast symmetrically without hydronephrosis, mass or perinephric fat stranding. No stones are observed. The ureters are normal in caliber. The urinary bladder is normal in wall thickness and capacity. The uterus and ovaries removed.    Clear lung bases. The heart is not enlarged. No effusions are present.    The osseous structures remain unremarkable. The aorta is normal in caliber.                                        CT HEAD WITHOUT IV CONTRAST (Final result)  Result time 03/13/25 23:01:22      Final result                   Impression:    IMPRESSION:  No acute intracranial abnormality.               Narrative:    CLINICAL HISTORY:  headache, nausea, r/o bleed    TECHNIQUE: Unenhanced CT of the head was obtained.  Axial, coronal and sagittal  reconstructions were reviewed. Dose reduction techniques such as automated  exposure control were used in this study where applicable.    COMPARISON: None    COMMENT:  The sulci and ventricles are within normal limits.    No CT evidence for acute transcortical infarct, acute intracranial hemorrhage,  midline shift or extra-axial fluid collection.    No depressed calvarial fracture.    The visualized orbits are grossly intact. No evidence of retrobulbar hematoma.    Visualized paranasal sinuses and mastoid air cells are clear.                                      ECG 12 lead   Independent Interpretation by ED Provider   Normal sinus rhythm 67 normal axis QTc 414 no STEMI independently reviewed and interpreted by me, Marvin Kee DO.            Scoring tools                                  ED Course & MDM   MDM / ED COURSE / CLINICAL IMPRESSION / DISPO     Medical Decision Making  54-year-old female with past medical history ADHD, anxiety, IBD, endometriosis, GERD/gastritis, kidney stones presenting for evaluation of 6 days of headache, nausea, hot flashes, as well as left lower quadrant abdominal pain that started earlier today.  On exam patient appears well and in no acute distress with stable vital signs.  There is mild LLQ tenderness to palpation without guarding or rebound.  No gross focal neurologic deficits.  CBC is WNL.  DDx considered but not limited to IBD, diverticulitis, GERD, obstruction, tension headache, cluster headache, migraine.  Will check CT brain, CT abdomen pelvis with IV contrast, labs, and give analgesics/antiemetics and reassess.    Problems Addressed:  Acute nonintractable headache, unspecified headache type: acute illness or injury  Left lower quadrant abdominal pain: acute illness or injury    Amount and/or Complexity of Data Reviewed  Labs: ordered. Decision-making details documented in ED Course.  Radiology: ordered and independent interpretation performed.  Decision-making details documented in ED Course.  ECG/medicine tests: ordered and independent interpretation performed.    Risk  OTC drugs.  Prescription drug management.        ED Course as of 03/14/25 0142   Thu Mar 13, 2025   2147 CBC and differential(!)  WNL [CM]   2235 Comprehensive metabolic panel(!)  unremarkable [CM]   2304 CT HEAD WITHOUT IV CONTRAST  IMPRESSION:  No acute intracranial abnormality.   [CM]   2318 Ct head  Agree /w rads  No bleed or mass seen  Independently reviewed and interpreted by Marvin owusu DO.   [SUSAN]   2318 Ct abd  No acute path seen on my review  Independently reviewed and interpreted by Marvin owusu DO.  Waiting on official radiology read.     [SUSAN]   Fri Mar 14, 2025   0026 CT ABDOMEN PELVIS WITH IV CONTRAST  Preliminary Impression:    Comparison is made to the prior exam of 2016. No evidence of bowel obstruction, free air or fluid. No bowel wall thickening, dilatation or surrounding fat stranding. No retroperitoneal adenopathy. Unremarkable appendix. No diverticula    The solid viscera are unremarkable. The gallbladder is poorly distended and normal in wall thickness without calcified stones. No biliary dilatation present.    The kidneys take up and excrete contrast symmetrically without hydronephrosis, mass or perinephric fat stranding. No stones are observed. The ureters are normal in caliber. The urinary bladder is normal in wall thickness and capacity. The uterus and ovaries removed.    Clear lung bases. The heart is not enlarged. No effusions are present.    The osseous structures remain unremarkable. The aorta is normal in caliber.    [CM]   0035 Updated patient with results, stable for discharge home.  No reason to admit the patient for further workup at this time. [CM]      ED Course User Index  [CM] Nguyễn Jacobs PA C  [SUSAN] Marvin Kee DO     Clinical Impression      Acute nonintractable headache, unspecified headache type   Left lower quadrant  abdominal pain     _________________       ED Disposition   Discharge                       Nguyễn Jacobs PA C  03/14/25 0142

## 2025-03-14 NOTE — PROGRESS NOTES
"Connie Mckeongabriel   [id number]    Your doctor has referred you for a   that requires the injection of an iodinated contrast material into your bloodstream. This iodinated contrast material, sometimes referred to as \"x-ray dye\" allows for better interpretation of the x-ray films or CT images and results in a more accurate interpretation of the examination.     Without the use of iodinated contrast (x-ray dye), the examination may be less informative and result in a suboptimal examination, and possibly a delay in diagnosis and, if needed, treatment.     The iodinated contrast material is given through a small needle or catheter placed into a vein, usually on the inside of the elbow, on the back of hand, or in a vein in the foot or lower leg.    The most common, though still rare, potential reaction to an intravenous contrast injection is an allergic-like reaction. Most allergic-like reactions are mild, though a small subset of people can have more severe reactions. Mild reactions include mild / scattered hives, itching, scratchy throat, sneezing and nasal congestion. More severe reactions include facial swelling, severe difficulty breathing, wheezing and anaphylactic shock. Those with a prior history allergic-like reaction to the same class of contrast media (such as CT contrast or MRI contrast) are at the highest risk for an allergic reaction.     If you believe you had an allergic reaction to contrast in the past, please let our staff know. We can determine if this increases your risk for a future reaction and provide steps to decrease the risk. This may delay your examination, but it decreases the risk of having a new and possibly more severe reaction to the contrast injection.    People with a history of prior allergic reactions to other substances (such as unrelated medications and food) and patients with a history of asthma have slightly increased risk for an allergic reaction from contrast material when " compared with the general population, but do not require to be pretreated prior to a contrast injection.    You should notify the physician, nurse or technologist if you have ever had any of these conditions or if you have any questions.

## 2025-03-17 ENCOUNTER — OFFICE VISIT (OUTPATIENT)
Dept: INTERNAL MEDICINE | Facility: HOSPITAL | Age: 55
End: 2025-03-17
Payer: COMMERCIAL

## 2025-03-17 VITALS
HEIGHT: 70 IN | BODY MASS INDEX: 20.62 KG/M2 | DIASTOLIC BLOOD PRESSURE: 68 MMHG | SYSTOLIC BLOOD PRESSURE: 118 MMHG | TEMPERATURE: 98.2 F | OXYGEN SATURATION: 100 % | WEIGHT: 144 LBS | RESPIRATION RATE: 20 BRPM | HEART RATE: 73 BPM

## 2025-03-17 DIAGNOSIS — J32.9 SINUSITIS, UNSPECIFIED CHRONICITY, UNSPECIFIED LOCATION: Primary | ICD-10-CM

## 2025-03-17 PROCEDURE — 99213 OFFICE O/P EST LOW 20 MIN: CPT | Mod: GC

## 2025-03-17 PROCEDURE — 3008F BODY MASS INDEX DOCD: CPT

## 2025-03-17 RX ORDER — FLUCONAZOLE 150 MG/1
150 TABLET ORAL DAILY
Qty: 2 TABLET | Refills: 0 | Status: SHIPPED | OUTPATIENT
Start: 2025-03-17 | End: 2025-03-19

## 2025-03-17 RX ORDER — AMOXICILLIN AND CLAVULANATE POTASSIUM 875; 125 MG/1; MG/1
1 TABLET, FILM COATED ORAL 2 TIMES DAILY
Qty: 14 TABLET | Refills: 0 | Status: SHIPPED | OUTPATIENT
Start: 2025-03-17 | End: 2025-03-24

## 2025-03-17 RX ORDER — CETIRIZINE HYDROCHLORIDE 10 MG/1
10 TABLET ORAL DAILY
Qty: 90 TABLET | Refills: 1 | Status: SHIPPED | OUTPATIENT
Start: 2025-03-17 | End: 2025-09-13

## 2025-03-17 ASSESSMENT — PAIN SCALES - GENERAL: PAINLEVEL_OUTOF10: 5

## 2025-03-17 ASSESSMENT — PATIENT HEALTH QUESTIONNAIRE - PHQ9: SUM OF ALL RESPONSES TO PHQ9 QUESTIONS 1 & 2: 0

## 2025-03-17 NOTE — PROGRESS NOTES
"     [facility]  Internal Medicine Progress Note         Assessment & Plan  Sinusitis, unspecified chronicity, unspecified location  The patient has long standing history of sinusitis   She reports frontal headache with nasal fullness and discharge   She denies fevers or chills  She saw ENT at [place] and will schedule a CT sinuses   PE with nasal congestion, rhinorrhea and sinus tenderness     -Augmentin for 7 days   -Continue Nasacort   -Start Zytrec everyday   -Follow up with ENT and schedule CT sinuses     Health care maintenance: [facility]    No follow-ups on file.  At next visit: [facility]    Rene Vann MD    SUBJECTIVE   Connie CELIS Arti is a 54 y.o. year-old female, primary patient of Dr. Mcclelland with a past medical history of ADHD, anxiety, IBD, endometriosis, GERD/gastritis, kidney stones  who presents for headache.     Interval History: She reports a headache that started approximately 10 days ago. The headache is not worse in the morning and not associated with nausea or vomiting. She has a longstanding history of sinus. She also reports post nasal drip without green discharge from nose. She denies sore throat or cough. No fevers or chills.   Nausea has gone down since last day. She has been using Nascort for the past few days. We discussed that she has had symptoms for over 10 days and will prescribe antibiotics. She has a history of yeast infections on antibiotics and will take Diflucan.     She also reports diffuse abdominal discomfort. She has IBS and has not had a bowel movement in 2-3 days. She does take Miralax occasionally which helps.   Constipation. Does have IBS     She is still smoking but has cut down to 8 cigarettes per day.      PHYSICAL EXAMINATION   Visit Vitals  /68 (BP Location: Left upper arm, Patient Position: Sitting)   Pulse 73   Temp 36.8 °C (98.2 °F) (Temporal)   Resp 20   Ht 1.778 m (5' 10\")   Wt 65.3 kg (144 lb)   SpO2 100%   BMI 20.66 kg/m²       Physical " Exam  HENT:      Head: Normocephalic.      Nose: Congestion and rhinorrhea present.      Right Sinus: Maxillary sinus tenderness and frontal sinus tenderness present.      Left Sinus: Maxillary sinus tenderness and frontal sinus tenderness present.      Mouth/Throat:      Pharynx: Posterior oropharyngeal erythema present.   Eyes:      Pupils: Pupils are equal, round, and reactive to light.   Cardiovascular:      Rate and Rhythm: Normal rate and regular rhythm.      Pulses: Normal pulses.      Heart sounds: Normal heart sounds.   Musculoskeletal:         General: Normal range of motion.   Skin:     General: Skin is warm.   Neurological:      General: No focal deficit present.      Mental Status: She is alert.   Psychiatric:         Mood and Affect: Mood normal.           LABS / IMAGING / STUDIES   Labs Reviewed:   CBC Results         03/13/25 02/14/25 10/02/24     2122 1237 1659    WBC 8.16 7.3 6.89    RBC 4.30 4.49 4.60    HGB 11.9 12.3 12.5    HCT 36.3 37.7 40.0    MCV 84.4 84 87.0    MCH 27.7 27.4 27.2    MCHC 32.8 32.6 31.3     294 277             Studies/Imaging Reviewed: N/A       MEDICATIONS      Current Outpatient Medications   Medication Instructions    atorvastatin (LIPITOR) 40 mg, oral, Daily    buPROPion (WELLBUTRIN) 200 mg, oral, Daily    escitalopram (LEXAPRO) 5 mg, oral, 4 times weekly    estradioL (ESTRACE) 0.5 mg, oral, 4 times weekly    famotidine (PEPCID) 20 mg, oral, 2 times daily PRN    nicotine (NICODERM CQ) 14 mg/24 hr 1 patch, transdermal, Every 24 hours interval    pantoprazole (PROTONIX) 40 mg, oral, Daily before breakfast    pantoprazole (PROTONIX) 20 mg, oral, Daily    traZODone (DESYREL) 50 mg, oral, Nightly PRN

## 2025-03-17 NOTE — PATIENT INSTRUCTIONS
Thank you for coming to see us today    For your headache and nasal congestion   -Please take Augmentin twice daily for 7 days  -Please take 2 dose of Diflucan to prevent yeast infection   -Please complete CT scan of your face  -Please start taking Zyrtec   -Please schedule schedule with your ENT     Please come back to see us again in 3 months or sooner if needed

## 2025-04-29 ENCOUNTER — OFFICE VISIT (OUTPATIENT)
Dept: INTERNAL MEDICINE | Facility: HOSPITAL | Age: 55
End: 2025-04-29
Payer: COMMERCIAL

## 2025-04-29 VITALS
RESPIRATION RATE: 18 BRPM | BODY MASS INDEX: 20.88 KG/M2 | HEART RATE: 79 BPM | SYSTOLIC BLOOD PRESSURE: 129 MMHG | WEIGHT: 141 LBS | HEIGHT: 69 IN | DIASTOLIC BLOOD PRESSURE: 77 MMHG | TEMPERATURE: 97.7 F | OXYGEN SATURATION: 98 %

## 2025-04-29 DIAGNOSIS — R73.03 PREDIABETES: Primary | ICD-10-CM

## 2025-04-29 DIAGNOSIS — E78.2 MIXED HYPERLIPIDEMIA: ICD-10-CM

## 2025-04-29 DIAGNOSIS — K21.9 GASTROESOPHAGEAL REFLUX DISEASE, UNSPECIFIED WHETHER ESOPHAGITIS PRESENT: ICD-10-CM

## 2025-04-29 DIAGNOSIS — Z72.0 TOBACCO USE: ICD-10-CM

## 2025-04-29 RX ORDER — ATORVASTATIN CALCIUM 40 MG/1
20 TABLET, FILM COATED ORAL DAILY
Qty: 45 TABLET | Refills: 1 | Status: SHIPPED | OUTPATIENT
Start: 2025-04-29 | End: 2025-10-26

## 2025-04-30 ENCOUNTER — TELEPHONE (OUTPATIENT)
Dept: PULMONOLOGY | Facility: HOSPITAL | Age: 55
End: 2025-04-30
Payer: COMMERCIAL

## 2025-05-12 ENCOUNTER — HOSPITAL ENCOUNTER (OUTPATIENT)
Dept: RADIOLOGY | Age: 55
Discharge: HOME | End: 2025-05-12
Payer: COMMERCIAL

## 2025-05-12 PROCEDURE — 71271 CT THORAX LUNG CANCER SCR C-: CPT

## 2025-05-13 ENCOUNTER — RESULTS FOLLOW-UP (OUTPATIENT)
Dept: INTERNAL MEDICINE | Facility: HOSPITAL | Age: 55
End: 2025-05-13
Payer: COMMERCIAL

## 2025-06-05 ENCOUNTER — APPOINTMENT (OUTPATIENT)
Dept: URBAN - METROPOLITAN AREA CLINIC 374 | Facility: CLINIC | Age: 55
Setting detail: DERMATOLOGY
End: 2025-06-05

## 2025-06-05 DIAGNOSIS — D18.0 HEMANGIOMA: ICD-10-CM

## 2025-06-05 DIAGNOSIS — Z85.828 PERSONAL HISTORY OF OTHER MALIGNANT NEOPLASM OF SKIN: ICD-10-CM

## 2025-06-05 DIAGNOSIS — L81.4 OTHER MELANIN HYPERPIGMENTATION: ICD-10-CM

## 2025-06-05 DIAGNOSIS — L82.1 OTHER SEBORRHEIC KERATOSIS: ICD-10-CM

## 2025-06-05 DIAGNOSIS — Z71.89 OTHER SPECIFIED COUNSELING: ICD-10-CM

## 2025-06-05 DIAGNOSIS — Z85.820 PERSONAL HISTORY OF MALIGNANT MELANOMA OF SKIN: ICD-10-CM

## 2025-06-05 DIAGNOSIS — D22 MELANOCYTIC NEVI: ICD-10-CM

## 2025-06-05 DIAGNOSIS — D485 NEOPLASM OF UNCERTAIN BEHAVIOR OF SKIN: ICD-10-CM | Status: INADEQUATELY CONTROLLED

## 2025-06-05 PROBLEM — D48.5 NEOPLASM OF UNCERTAIN BEHAVIOR OF SKIN: Status: ACTIVE | Noted: 2025-06-05

## 2025-06-05 PROBLEM — D22.5 MELANOCYTIC NEVI OF TRUNK: Status: ACTIVE | Noted: 2025-06-05

## 2025-06-05 PROBLEM — D18.01 HEMANGIOMA OF SKIN AND SUBCUTANEOUS TISSUE: Status: ACTIVE | Noted: 2025-06-05

## 2025-06-05 PROCEDURE — ? TREATMENT REGIMEN

## 2025-06-05 PROCEDURE — ? FULL BODY SKIN EXAM

## 2025-06-05 PROCEDURE — ? DEFER

## 2025-06-05 PROCEDURE — ? PHOTO-DOCUMENTATION

## 2025-06-05 PROCEDURE — ? COUNSELING

## 2025-06-05 ASSESSMENT — LOCATION DETAILED DESCRIPTION DERM
LOCATION DETAILED: LEFT ANTERIOR SHOULDER
LOCATION DETAILED: EPIGASTRIC SKIN
LOCATION DETAILED: UPPER STERNUM
LOCATION DETAILED: LEFT MEDIAL PROXIMAL PRETIBIAL REGION
LOCATION DETAILED: RIGHT ANTERIOR EARLOBE
LOCATION DETAILED: LEFT CENTRAL MALAR CHEEK
LOCATION DETAILED: RIGHT MEDIAL FOREHEAD
LOCATION DETAILED: RIGHT INFERIOR MEDIAL UPPER BACK
LOCATION DETAILED: INFERIOR THORACIC SPINE
LOCATION DETAILED: RIGHT ANTERIOR SHOULDER

## 2025-06-05 ASSESSMENT — LOCATION SIMPLE DESCRIPTION DERM
LOCATION SIMPLE: CHEST
LOCATION SIMPLE: RIGHT FOREHEAD
LOCATION SIMPLE: RIGHT EAR
LOCATION SIMPLE: LEFT SHOULDER
LOCATION SIMPLE: ABDOMEN
LOCATION SIMPLE: RIGHT SHOULDER
LOCATION SIMPLE: UPPER BACK
LOCATION SIMPLE: RIGHT UPPER BACK
LOCATION SIMPLE: LEFT CHEEK
LOCATION SIMPLE: LEFT PRETIBIAL REGION

## 2025-06-05 ASSESSMENT — LOCATION ZONE DERM
LOCATION ZONE: FACE
LOCATION ZONE: ARM
LOCATION ZONE: TRUNK
LOCATION ZONE: LEG
LOCATION ZONE: EAR

## 2025-06-05 NOTE — PROCEDURE: MIPS QUALITY
Quality 137: Melanoma: Continuity Of Care - Recall System: Patient information entered into a recall system that includes: target date for the next exam specified AND a process to follow up with patients regarding missed or unscheduled appointments
Quality 431: Preventive Care And Screening: Unhealthy Alcohol Use - Screening: Patient not identified as an unhealthy alcohol user when screened for unhealthy alcohol use using a systematic screening method
Quality 226: Preventive Care And Screening: Tobacco Use: Screening And Cessation Intervention: Patient screened for tobacco use, is a smoker AND received Cessation Counseling within measurement period or in the six months prior to the measurement period
Quality 130: Documentation Of Current Medications In The Medical Record: Current Medications Documented
Detail Level: Detailed

## 2025-06-05 NOTE — PROCEDURE: DEFER
X Size Of Lesion In Cm (Optional): 0
Detail Level: Detailed
Size Of Lesion In Cm (Optional): 0.1
Introduction Text (Please End With A Colon): The following procedure was deferred:

## 2025-06-11 NOTE — PROGRESS NOTES
Gastroenterology Clinic Note       SUBJECTIVE   Connie Chi is a 54 y.o. year-old female  who presents for follow-up of GERD.    Patient was last seen by me in [facility] 12/5/24. At that time, she was suspected to have GERD without esophagitis exacerbated by minocycline that she was taking for dermatitis. Plans were to discontinue the minocycline and trial a different abx for dermatitis as well as PPI taper (per patient request) once adequate control of symptoms was obtained.      Patient presents today in overall good health.   She has successfully weaned off PPI and H2B with only 1 recent episode of heartburn/dyspepsia which was resolved with 1 dose of PPI.   She denies any daily symptoms. No dysphagia, N/V, weight loss.     She still reports occasional LLQ pain which she attributes to previous iliopsoas muscle strain. Pain occurs once weekly and is typically associated with constipation. Stool is reportedly firm and brown. No bleeding. Pain mildly improved after bowel movement. No significant occurrences outside of those episodes.   Patient has been eating oatmeal every morning which has regulated her bowel movements. She has had an intentional weight loss through improved diet and physical activity.        REVIEW OF SYSTEMS   As per HPI.     HISTORIES     Social History     Socioeconomic History    Marital status: Single   Tobacco Use    Smoking status: Every Day     Current packs/day: 0.50     Average packs/day: 0.5 packs/day for 43.4 years (21.7 ttl pk-yrs)     Types: Cigarettes     Start date: 1982     Passive exposure: Past    Smokeless tobacco: Never   Vaping Use    Vaping status: Never Used   Substance and Sexual Activity    Alcohol use: No    Drug use: Yes     Types: Marijuana    Sexual activity: Defer     Social Drivers of Health     Food Insecurity: No Food Insecurity (3/13/2025)    Hunger Vital Sign     Worried About Running Out of Food in the Last Year: Never true     Ran Out of Food in  the Last Year: Never true      Past Medical History:   Diagnosis Date    ADHD     Anxiety     Bowel disease, inflammatory     Endometriosis     Gastroenteritis     GERD (gastroesophageal reflux disease)       Allergies   Allergen Reactions    Amoxicillin Hives and Rash     Other Reaction(s): Unknown    Adhesive      Other Reaction(s): gets permanent geraldine the shape of adhesive    Pollen Extracts      Other Reaction(s): cough, sinusitis      Past Surgical History   Procedure Laterality Date    FLEXIBLE COLONOSCOPY PROXIMAL TO SPLENIC FLEXURE WITH BIOPSY N/A 6/20/2018    Performed by Gerson Bocanegra MD at [facility]    Hysterectomy  2008    IZZY 2/2 endometriosis    UPPER GASTROINTESTINAL ENDOSCOPY WITH BIOPSY N/A 6/20/2018    Performed by Gerson Bocanegra MD at [facility]    [place] tooth extraction        Family History   Problem Relation Name Age of Onset    Dementia Biological Mother      Heart disease Biological Father      Lumbar disc disease Biological Father      Lung cancer Neg Hx           MEDICATIONS        Current Outpatient Medications:     famotidine (PEPCID) 20 mg tablet, Take 20 mg by mouth 2 (two) times a day as needed for heartburn or indigestion., Disp: , Rfl:     atorvastatin (LIPITOR) 40 mg tablet, Take 0.5 tablets (20 mg total) by mouth daily., Disp: 45 tablet, Rfl: 1    buPROPion (WELLBUTRIN) 100 mg tablet, Take 200 mg by mouth daily., Disp: , Rfl:     cetirizine (ZyrTEC) 10 mg tablet, Take 1 tablet (10 mg total) by mouth daily., Disp: 90 tablet, Rfl: 1    escitalopram 5 mg tablet, Take 5 mg by mouth 4 (four) times a week (Sun, Tue, Thu, Sat). , Disp: , Rfl:     estradiol (ESTRACE) 1 mg tablet, Take 0.5 mg by mouth 4 (four) times a week (Sun, Tue, Thu, Sat). , Disp: , Rfl:     nicotine (NICODERM CQ) 14 mg/24 hr, Place 1 patch on the skin daily., Disp: 30 patch, Rfl: 0    traZODone (DESYREL) 50 mg tablet, Take 50 mg by mouth nightly as needed.  , Disp: , Rfl:     PHYSICAL EXAMINATION    Visit  "Vitals  BP (!) 109/57 (BP Location: Right upper arm, Patient Position: Sitting)   Pulse 85   Temp 36.1 °C (97 °F) (Temporal)   Resp 18   Ht 1.753 m (5' 9\")   Wt 64.4 kg (142 lb)   SpO2 98%   BMI 20.97 kg/m²     Physical Exam  Vitals reviewed.   Constitutional:       Appearance: She is normal weight.   HENT:      Head: Normocephalic and atraumatic.      Mouth/Throat:      Mouth: Mucous membranes are moist.      Pharynx: Oropharynx is clear.   Eyes:      Extraocular Movements: Extraocular movements intact.      Conjunctiva/sclera: Conjunctivae normal.      Pupils: Pupils are equal, round, and reactive to light.   Cardiovascular:      Rate and Rhythm: Normal rate.      Pulses: Normal pulses.   Pulmonary:      Effort: Pulmonary effort is normal.   Abdominal:      General: Abdomen is flat. Bowel sounds are normal. There is no distension.      Palpations: Abdomen is soft.      Tenderness: There is no abdominal tenderness.   Skin:     General: Skin is warm and dry.      Findings: No lesion or rash.   Neurological:      General: No focal deficit present.      Mental Status: She is alert and oriented to person, place, and time. Mental status is at baseline.   Psychiatric:         Mood and Affect: Mood normal.         Behavior: Behavior normal.          LABS / IMAGING/STUDIES      Labs Reviewed:   Lab Results   Component Value Date    GLUCOSE 138 (H) 03/13/2025    CALCIUM 9.7 03/13/2025     (L) 03/13/2025    K 4.0 03/13/2025    CO2 28 03/13/2025     03/13/2025    BUN 13 03/13/2025    CREATININE 1.1 03/13/2025     Lab Results   Component Value Date    WBC 8.16 03/13/2025    HGB 11.9 03/13/2025    HCT 36.3 03/13/2025    MCV 84.4 03/13/2025     03/13/2025     Lab Results   Component Value Date    HEPCAB <0.1 04/12/2022       Studies/Imaging Reviewed        ASSESSMENT AND PLAN   Problem List Items Addressed This Visit       Irritable bowel syndrome with constipation    Current Assessment & Plan   Controlled " through physical activity and dietary modifications including increased dietary fiber.     Plan:  -Encouraged continued healthy lifestyle changes  -RTC in 6 months for follow-up at which time repeat Colonoscopy can be scheduled for 2026 (per previous recall recommendation).            GERD without esophagitis - Primary    Current Assessment & Plan   Diet-controlled.   Not on regular PPI or H2B.   No alarm symptoms.     Plan:  -Continue healthy lifestyle modifications including avoidance of known GERD triggers.   -H2B like famotidine as needed for breakthrough symptoms.   -Iron studies to eval for iron deficiency per patient request. Hgb WNL.            Relevant Medications    famotidine (PEPCID) 20 mg tablet    Other Relevant Orders    Iron and TIBC    Ferritin          Final recommendations pending attending attestation.    Abimael Razo, DO  6/12/2025 3:48 PM  Gastroenterology Fellow PGY-6  Pager [phone]

## 2025-06-12 ENCOUNTER — OFFICE VISIT (OUTPATIENT)
Dept: GASTROENTEROLOGY | Facility: HOSPITAL | Age: 55
End: 2025-06-12
Payer: COMMERCIAL

## 2025-06-12 VITALS
BODY MASS INDEX: 21.03 KG/M2 | HEIGHT: 69 IN | RESPIRATION RATE: 18 BRPM | SYSTOLIC BLOOD PRESSURE: 109 MMHG | TEMPERATURE: 97 F | DIASTOLIC BLOOD PRESSURE: 57 MMHG | OXYGEN SATURATION: 98 % | WEIGHT: 142 LBS | HEART RATE: 85 BPM

## 2025-06-12 DIAGNOSIS — K21.9 GERD WITHOUT ESOPHAGITIS: Primary | ICD-10-CM

## 2025-06-12 DIAGNOSIS — K58.1 IRRITABLE BOWEL SYNDROME WITH CONSTIPATION: ICD-10-CM

## 2025-06-12 PROCEDURE — 99900024 HB NONBILLABLE HOSPITAL CLINIC SERVICE: Performed by: STUDENT IN AN ORGANIZED HEALTH CARE EDUCATION/TRAINING PROGRAM

## 2025-06-12 RX ORDER — FAMOTIDINE 20 MG/1
20 TABLET, FILM COATED ORAL 2 TIMES DAILY PRN
COMMUNITY

## 2025-06-12 NOTE — ASSESSMENT & PLAN NOTE
Diet-controlled.   Not on regular PPI or H2B.   No alarm symptoms.     Plan:  -Continue healthy lifestyle modifications including avoidance of known GERD triggers.   -H2B like famotidine as needed for breakthrough symptoms.   -Iron studies to eval for iron deficiency per patient request. Hgb WNL.

## 2025-06-12 NOTE — PROGRESS NOTES
I have discussed the patient's care with Dr. Razo. I have reviewed the patient's history and Dr. Razo' findings on exam, the patient's diagnosis/differential diagnosis and treatment plan. I concur with the treatment plan as documented by Dr. Razo, except for my comments below or within additional notes today.

## 2025-06-12 NOTE — ASSESSMENT & PLAN NOTE
Controlled through physical activity and dietary modifications including increased dietary fiber.     Plan:  -Encouraged continued healthy lifestyle changes  -RTC in 6 months for follow-up at which time repeat Colonoscopy can be scheduled for 2026 (per previous recall recommendation).

## 2025-06-26 ENCOUNTER — APPOINTMENT (OUTPATIENT)
Dept: URBAN - METROPOLITAN AREA CLINIC 374 | Facility: CLINIC | Age: 55
Setting detail: DERMATOLOGY
End: 2025-06-26

## 2025-06-26 DIAGNOSIS — D485 NEOPLASM OF UNCERTAIN BEHAVIOR OF SKIN: ICD-10-CM

## 2025-06-26 PROBLEM — D48.5 NEOPLASM OF UNCERTAIN BEHAVIOR OF SKIN: Status: ACTIVE | Noted: 2025-06-26

## 2025-06-26 PROCEDURE — ? BIOPSY BY SHAVE METHOD

## 2025-06-26 ASSESSMENT — LOCATION ZONE DERM: LOCATION ZONE: LEG

## 2025-06-26 ASSESSMENT — LOCATION SIMPLE DESCRIPTION DERM: LOCATION SIMPLE: LEFT PRETIBIAL REGION

## 2025-06-26 ASSESSMENT — LOCATION DETAILED DESCRIPTION DERM: LOCATION DETAILED: LEFT PROXIMAL PRETIBIAL REGION

## 2025-06-26 NOTE — PROCEDURE: BIOPSY BY SHAVE METHOD
Detail Level: Detailed
Depth Of Biopsy: dermis
Was A Bandage Applied: Yes
Size Of Lesion In Cm: 0.1
X Size Of Lesion In Cm: 0
Biopsy Type: H and E
Biopsy Method: Dermablade
Anesthesia Type: 1% lidocaine with epinephrine
Anesthesia Volume In Cc: 0.2
Hemostasis: Drysol
Wound Care: Petrolatum
Dressing: bandage
Destruction After The Procedure: No
Type Of Destruction Used: Curettage
Curettage Text: The wound bed was treated with curettage after the biopsy was performed.
Cryotherapy Text: The wound bed was treated with cryotherapy after the biopsy was performed.
Electrodesiccation Text: The wound bed was treated with electrodesiccation after the biopsy was performed.
Electrodesiccation And Curettage Text: The wound bed was treated with electrodesiccation and curettage after the biopsy was performed.
Silver Nitrate Text: The wound bed was treated with silver nitrate after the biopsy was performed.
Lab: 6
Lab Facility: 3
Medical Necessity Information: It is in your best interest to select a reason for this procedure from the list below. All of these items fulfill various CMS LCD requirements except the new and changing color options.
Consent: Written consent was obtained and risks were reviewed including but not limited to scarring, infection, bleeding, scabbing, incomplete removal, nerve damage and allergy to anesthesia.
Post-Care Instructions: I reviewed with the patient in detail post-care instructions. Patient is to keep the biopsy site dry overnight, and then apply bacitracin twice daily until healed. Patient may apply hydrogen peroxide soaks to remove any crusting.
Notification Instructions: Patient will be notified of biopsy results. However, patient instructed to call the office if not contacted within 2 weeks.
Billing Type: Third-Party Bill
Information: Selecting Yes will display possible errors in your note based on the variables you have selected. This validation is only offered as a suggestion for you. PLEASE NOTE THAT THE VALIDATION TEXT WILL BE REMOVED WHEN YOU FINALIZE YOUR NOTE. IF YOU WANT TO FAX A PRELIMINARY NOTE YOU WILL NEED TO TOGGLE THIS TO 'NO' IF YOU DO NOT WANT IT IN YOUR FAXED NOTE.

## 2025-07-17 ENCOUNTER — TELEPHONE (OUTPATIENT)
Dept: INTERNAL MEDICINE | Facility: HOSPITAL | Age: 55
End: 2025-07-17
Payer: COMMERCIAL

## 2025-07-29 ENCOUNTER — OFFICE VISIT (OUTPATIENT)
Dept: INTERNAL MEDICINE | Facility: HOSPITAL | Age: 55
End: 2025-07-29
Payer: COMMERCIAL

## 2025-07-29 VITALS
OXYGEN SATURATION: 99 % | BODY MASS INDEX: 20.59 KG/M2 | TEMPERATURE: 97.9 F | RESPIRATION RATE: 18 BRPM | SYSTOLIC BLOOD PRESSURE: 136 MMHG | DIASTOLIC BLOOD PRESSURE: 57 MMHG | HEART RATE: 76 BPM | HEIGHT: 69 IN | WEIGHT: 139 LBS

## 2025-07-29 DIAGNOSIS — E78.5 HYPERLIPIDEMIA, UNSPECIFIED HYPERLIPIDEMIA TYPE: ICD-10-CM

## 2025-07-29 DIAGNOSIS — R07.89 OTHER CHEST PAIN: Primary | ICD-10-CM

## 2025-07-29 DIAGNOSIS — D50.9 IRON DEFICIENCY ANEMIA, UNSPECIFIED IRON DEFICIENCY ANEMIA TYPE: ICD-10-CM

## 2025-07-29 DIAGNOSIS — Z12.31 BREAST CANCER SCREENING BY MAMMOGRAM: ICD-10-CM

## 2025-07-29 DIAGNOSIS — F41.9 ANXIETY: ICD-10-CM

## 2025-07-29 LAB
ATRIAL RATE: 64
CHOLEST SERPL-MCNC: 172 MG/DL (ref 100–199)
FERRITIN SERPL-MCNC: 64 NG/ML (ref 15–150)
HBA1C MFR BLD: 6 % (ref 4.8–5.6)
HDLC SERPL-MCNC: 46 MG/DL
IRON SATN MFR SERPL: 12 % (ref 15–55)
IRON SERPL-MCNC: 33 UG/DL (ref 27–159)
LDLC SERPL CALC-MCNC: 116 MG/DL (ref 0–99)
P AXIS: 39
PR INTERVAL: 158
QRS DURATION: 80
QT INTERVAL: 386
QTC CALCULATION(BAZETT): 398
R AXIS: 29
T WAVE AXIS: 45
TIBC SERPL-MCNC: 281 UG/DL (ref 250–450)
TRIGL SERPL-MCNC: 50 MG/DL (ref 0–149)
UIBC SERPL-MCNC: 248 UG/DL (ref 131–425)
VENTRICULAR RATE: 64
VLDLC SERPL CALC-MCNC: 10 MG/DL (ref 5–40)

## 2025-07-29 PROCEDURE — 3008F BODY MASS INDEX DOCD: CPT

## 2025-07-29 PROCEDURE — 93005 ELECTROCARDIOGRAM TRACING: CPT

## 2025-07-29 PROCEDURE — 99213 OFFICE O/P EST LOW 20 MIN: CPT | Mod: GE,GC

## 2025-07-29 RX ORDER — FERROUS SULFATE 300 MG/5ML
300 LIQUID (ML) ORAL EVERY OTHER DAY
Qty: 225 ML | Refills: 3 | Status: SHIPPED | OUTPATIENT
Start: 2025-07-29 | End: 2026-07-29

## 2025-07-29 ASSESSMENT — PAIN SCALES - GENERAL: PAINLEVEL_OUTOF10: 0-NO PAIN

## 2025-07-29 NOTE — PATIENT INSTRUCTIONS
Dear Connie Chi,    It was a pleasure seeing you today at our [facility] internal medicine clinic.     To Do:  -please  your medications from your pharmacy  -please call and schedule an appointment with cardiology.     We look forward to seeing you in 4 months or sooner should you need to see us earlier for any reason. If you have any questions or concers, please do not hesitate to get in touch via [employer] or call us at [phone]. Wishing you the best of [facility] and thank you for choosing [facility].     Lupis Maldonado MD  Internal Medicine, PGY2.  [phone]

## 2025-07-30 NOTE — ASSESSMENT & PLAN NOTE
EKG showed normal sinus rhythm with no acute ischemic changes  Pain is very atypical for ACS  Physical examination shows no evidence of friction rub  CT imaging reviewed and there is a small speck of calcification    Plan:  -Given the patient's risk factors, reasonable to refer to cardiology mainly for risk stratification and preventative measures  Orders:    Ambulatory referral to [facility]; Future    [facility]

## 2025-07-30 NOTE — ASSESSMENT & PLAN NOTE
Home regimen Wellbutrin 200 mg daily, trazodone 50 mg at night to augment her sleep  Patient follows with Dr. Sinclair at [facility] counseling    Plan:  - Continue home regimen for now as patient reports that her anxiety is reasonably well-controlled outside the context of acute stressors such as her mother's health

## 2025-08-06 ENCOUNTER — TELEPHONE (OUTPATIENT)
Dept: INTERNAL MEDICINE | Facility: HOSPITAL | Age: 55
End: 2025-08-06
Payer: COMMERCIAL

## 2025-08-06 NOTE — TELEPHONE ENCOUNTER
Called the patient and discussed her screening mammogram.  Patient is interested in having a repeat screening mammogram since she is due for it.  Offered her the phone number and she will call and schedule it.

## 2025-09-04 ENCOUNTER — OFFICE VISIT (OUTPATIENT)
Dept: GASTROENTEROLOGY | Facility: HOSPITAL | Age: 55
End: 2025-09-04
Payer: COMMERCIAL

## 2025-09-04 VITALS
DIASTOLIC BLOOD PRESSURE: 53 MMHG | HEART RATE: 92 BPM | RESPIRATION RATE: 18 BRPM | SYSTOLIC BLOOD PRESSURE: 109 MMHG | OXYGEN SATURATION: 96 % | TEMPERATURE: 97.7 F | BODY MASS INDEX: 19.7 KG/M2 | HEIGHT: 69 IN | WEIGHT: 133 LBS

## 2025-09-04 DIAGNOSIS — K58.1 IRRITABLE BOWEL SYNDROME WITH CONSTIPATION: ICD-10-CM

## 2025-09-04 DIAGNOSIS — R63.4 WEIGHT LOSS: Primary | ICD-10-CM

## 2025-09-04 DIAGNOSIS — K21.9 GERD WITHOUT ESOPHAGITIS: ICD-10-CM

## 2025-09-04 RX ORDER — BISACODYL 5 MG
10 TABLET, DELAYED RELEASE (ENTERIC COATED) ORAL 2 TIMES DAILY
Qty: 4 TABLET | Refills: 0 | Status: SHIPPED | OUTPATIENT
Start: 2025-09-04 | End: 2025-09-05

## 2025-09-04 RX ORDER — FAMOTIDINE 20 MG/1
20 TABLET, FILM COATED ORAL 2 TIMES DAILY PRN
Qty: 90 TABLET | Refills: 3 | Status: SHIPPED | OUTPATIENT
Start: 2025-09-04

## 2025-09-04 RX ORDER — POLYETHYLENE GLYCOL 3350 17 G/17G
POWDER, FOR SOLUTION ORAL
Qty: 238 G | Refills: 0 | Status: SHIPPED | OUTPATIENT
Start: 2025-09-04

## (undated) DEVICE — MOUTHPIECE 60FR ENDO BITEBLOCK

## (undated) DEVICE — TUBE CONNECTOR CO2 OLYMPUS

## (undated) DEVICE — FORCEP COLD RADIAL JAW

## (undated) DEVICE — SET TUBING/CAP HYBRID CO2 CLEVERCAP

## (undated) DEVICE — CANISTER KIT 1500CC WITH 6FT TUBING SEMI-RIGID SAFELINER

## (undated) DEVICE — SOLN IV 0.9% NSS 1000ML

## (undated) DEVICE — CONNECTOR PORT W/VALVE FOR OLYMPUS 160/180 SCOPE